# Patient Record
Sex: FEMALE | Race: BLACK OR AFRICAN AMERICAN | NOT HISPANIC OR LATINO | Employment: UNEMPLOYED | URBAN - METROPOLITAN AREA
[De-identification: names, ages, dates, MRNs, and addresses within clinical notes are randomized per-mention and may not be internally consistent; named-entity substitution may affect disease eponyms.]

---

## 2019-04-18 ENCOUNTER — HOSPITAL ENCOUNTER (OUTPATIENT)
Dept: PHYSICAL THERAPY | Facility: CLINIC | Age: 44
Setting detail: RECURRING SERIES
Discharge: HOME | End: 2019-06-27
Attending: NURSE PRACTITIONER

## 2019-05-03 ENCOUNTER — HOSPITAL ENCOUNTER (OUTPATIENT)
Dept: CARDIOLOGY | Facility: HOSPITAL | Age: 44
Discharge: HOME | End: 2019-05-03
Attending: NURSE PRACTITIONER

## 2020-02-21 ENCOUNTER — HOSPITAL ENCOUNTER (OUTPATIENT)
Dept: URGENT CARE | Facility: CLINIC | Age: 45
Discharge: HOME | End: 2020-02-21

## 2020-04-06 ENCOUNTER — OFFICE VISIT CONVERTED (OUTPATIENT)
Dept: CARDIOLOGY | Facility: CLINIC | Age: 45
End: 2020-04-06
Attending: INTERNAL MEDICINE

## 2020-04-22 ENCOUNTER — HOSPITAL ENCOUNTER (OUTPATIENT)
Dept: NUCLEAR MEDICINE | Facility: HOSPITAL | Age: 45
Discharge: HOME | End: 2020-04-22
Attending: INTERNAL MEDICINE

## 2020-08-18 ENCOUNTER — HOSPITAL ENCOUNTER (OUTPATIENT)
Dept: URGENT CARE | Facility: CLINIC | Age: 45
Discharge: HOME | End: 2020-08-18

## 2020-09-05 ENCOUNTER — HOSPITAL ENCOUNTER (OUTPATIENT)
Dept: URGENT CARE | Facility: CLINIC | Age: 45
Discharge: HOME | End: 2020-09-05
Attending: NURSE PRACTITIONER

## 2020-10-09 ENCOUNTER — HOSPITAL ENCOUNTER (OUTPATIENT)
Dept: URGENT CARE | Facility: CLINIC | Age: 45
Discharge: HOME | End: 2020-10-09
Attending: EMERGENCY MEDICINE

## 2020-10-09 LAB — GLUCOSE BLD-MCNC: 107 MG/DL (ref 65–99)

## 2020-10-12 LAB — BACTERIA SPEC AEROBE CULT: NORMAL

## 2021-05-10 NOTE — H&P
History and Physical      Patient Name: Casi Lopez   Patient ID: [id number]   Sex: Female   YOB: 1975        Visit Date: April 6, 2020    Provider: Gilbert Hill MD   Location: [facility]   Location Address: [address] [place] [address]  [id number]          History Of Present Illness  Consult requested by: [facility]   I saw Casi Lopez in the office today. This is a 44 year old /Black female. She has a history of atrial septal defect with surgical repair at age 5 in 1980. She has had intermittent chest pain off and on for many years. She last saw cardiology, it appears, in 2014. At that time she was seeing Dr. Darden. More recently, she has developed recurrent episodes of chest pain. It is moderately intense, sharp, random in occurrence, occasionally associated with some pressure in her chest. It sometimes occurs daily and lasts 10 to 15 minutes. It is not specifically exertional. She has not experienced syncope or significant palpitations. She had an EKG done in her primary care office, which showed diffuse precordial ST-T abnormalities. Interestingly, reviewing her records from 2011, similar changes were noted on an EKG at that time when she came in for a stress test.   PAST MEDICAL HISTORY: includes prediabetes, arthritis, heart murmur.   PSYCHOSOCIAL HISTORY: The patient is . No alcohol, smoking or drug use. She works as an /.   FAMILY HISTORY: Positive for diabetes and hypertension.   CURRENT MEDICATIONS: include Risperidone; Oxycodone; Neurontin; Vitamin D; Albuterol; Flexeril; Metformin 500 mg once a day. The dosage and frequency of the medications were reviewed with the patient.   ALLERGIES: No known drug allergies.      PAST SURGICAL HISTORY:  Hysterectomy, ASD repair, tubal ligation.       Review of Systems  · Constitutional  o Admits  o : good general health lately  o Denies  o : fatigue, recent weight  "changes   · Eyes  o Denies  o : double vision  · HENT  o Denies  o : hearing loss or ringing, chronic sinus problem, swollen glands in neck  · Cardiovascular  o Admits  o : chest pain, occasional feet and ankle swelling.  o Denies  o : palpitations (fast, fluttering, or skipping beats), shortness of breath while walking or lying flat  · Respiratory  o Admits  o : asthma or wheezing  o Denies  o : chronic or frequent cough, COPD  · Gastrointestinal  o Denies  o : ulcers, nausea or vomiting  · Neurologic  o Admits  o : lightheaded or dizzy, headaches  o Denies  o : stroke  · Musculoskeletal  o Admits  o : arthralgias and back pain.  · Endocrine  o Admits  o : diabetes  o Denies  o : thyroid disease, heat or cold intolerance, excessive thirst or urination  · Heme-Lymph  o Admits  o : anemia  o Denies  o : bleeding or bruising tendency      Vitals  Date Time BP Position Site L\R Cuff Size HR RR TEMP (F) WT  HT  BMI kg/m2 BSA m2 O2 Sat HC       04/06/2020 11:59 /87 Sitting    90 - R   184lbs 0oz 5'  7\" 28.82 1.99           Physical Examination  · Constitutional  o Appearance  o : This is an overweight, -American female, pleasant, in no acute distress.  · Head and Face  o HEENT  o : No pallor, anicteric. Eyes normal. Moist mucous membranes.  · Neck  o Inspection/Palpation  o : Supple. No hepatosplenomegaly.  o Jugular Veins  o : No JVD. No carotid bruits.  · Respiratory  o Auscultation of Lungs  o : Clear to auscultation bilaterally. No crackles or wheezing.  · Cardiovascular  o Heart  o : Regular rate and rhythm. Prominent S1. Soft basal systolic murmur.   · Gastrointestinal  o Abdominal Examination  o : Soft, non-distended. No palpable hepatosplenomegaly. Bowel sounds heard in all four quadrants.  · Musculoskeletal  o General  o : Normal muscle tone and strength.  · Skin and Subcutaneous Tissue  o General Inspection  o : No skin rashes.  · Extremities  o Extremities  o : Warm and well perfused. Distal " pulses present. No pitting pedal edema.     EKG reviewed by me showed sinus rhythm with left atrial abnormality, right ventricular hypertrophy with diffuse precordial ST-T changes.    Laboratory studies recently show mild anemia; otherwise, normal chemistry from late 2019.  Most recent lipid panel was several years ago and showed elevated total cholesterol.               Assessment     1.  Chest pain - The patient's chest pain is somewhat atypical in pattern.  Her risk factors for coronary artery        disease include obesity and possibly diabetes.  She has not had any cardiac workup recently.  Her baseline        EKG appears to be unchanged from her previous EKG in 2011 and is most likely consistent with right        ventricular hypertrophy from previous ASD, which eventually was repaired in 1980.    2.  ASD - status post surgical repair in 1980.  3.  Overweight, BMI 28.       Plan     1.  Regarding the patient's chest pain, she has not had an ischemia evaluation in some time, so we will        schedule stress imaging to evaluate for ischemia.  Given the abnormal baseline EKG, plain treadmill stress       testing is not going to be helpful in this situation.  I do suspect, however, these EKG changes are related to       her history of ASD and right ventricular overload and not likely to represent ischemic heart disease.    2.  An echocardiogram will be scheduled to evaluate right ventricular systolic pressure and right-sided chamber       sizes.  I suspect these are somewhat chronically elevated due to history of ASD.    3.  Regarding her elevated BMI, we discussed nutritional approaches to weight loss both to reduce her BMI as       well as to prevent future development of diabetes.  4.  We will discuss the diagnostic results when available; otherwise, the patient will be scheduled for a one-       year follow-up due to her cardiac history.  She is agreeable with this plan.    Thank you for allowing me to  participate in Ms. Lopez's care.  Please let me know if you have any questions regarding her case.    Sincerely,      RIANNA Hill M.D.  monico/demarco           This note was transcribed by Henrietta Sampson.  dmd/cbd  The above service was transcribed by Henrietta Sampson, and I attest to the accuracy of the note.  CBD..             Electronically Signed by: Henrietta Sampson-, -Author on April 7, 2020 10:17:04 AM  Electronically Co-signed by: Gilbert Hill MD -Reviewer on April 9, 2020 11:49:40 AM

## 2021-05-15 VITALS
WEIGHT: 184 LBS | HEART RATE: 90 BPM | BODY MASS INDEX: 28.88 KG/M2 | DIASTOLIC BLOOD PRESSURE: 87 MMHG | SYSTOLIC BLOOD PRESSURE: 137 MMHG | HEIGHT: 67 IN

## 2021-06-28 ENCOUNTER — TELEPHONE (OUTPATIENT)
Dept: CARDIOLOGY | Facility: CLINIC | Age: 46
End: 2021-06-28

## 2021-06-28 NOTE — TELEPHONE ENCOUNTER
ATTEMPTED TO CALL PT TO SEE IF THEY WANTED TO RS THEIR MISSED APPT. THEIR NUMBER IS NOT A WORKING NUMBER.

## 2021-06-30 ENCOUNTER — OFFICE VISIT (OUTPATIENT)
Dept: SLEEP MEDICINE | Facility: HOSPITAL | Age: 46
End: 2021-06-30

## 2021-06-30 VITALS
WEIGHT: 190 LBS | SYSTOLIC BLOOD PRESSURE: 123 MMHG | BODY MASS INDEX: 29.82 KG/M2 | HEIGHT: 67 IN | OXYGEN SATURATION: 96 % | DIASTOLIC BLOOD PRESSURE: 79 MMHG | HEART RATE: 88 BPM

## 2021-06-30 DIAGNOSIS — J45.909 MODERATE ASTHMA, UNSPECIFIED WHETHER COMPLICATED, UNSPECIFIED WHETHER PERSISTENT: ICD-10-CM

## 2021-06-30 DIAGNOSIS — G47.30 OBSERVED SLEEP APNEA: Primary | ICD-10-CM

## 2021-06-30 DIAGNOSIS — F51.04 PSYCHOPHYSIOLOGICAL INSOMNIA: ICD-10-CM

## 2021-06-30 DIAGNOSIS — G47.10 HYPERSOMNIA: ICD-10-CM

## 2021-06-30 DIAGNOSIS — R06.83 SNORING: ICD-10-CM

## 2021-06-30 PROCEDURE — G0463 HOSPITAL OUTPT CLINIC VISIT: HCPCS

## 2021-06-30 PROCEDURE — 99244 OFF/OP CNSLTJ NEW/EST MOD 40: CPT | Performed by: INTERNAL MEDICINE

## 2021-06-30 RX ORDER — QUETIAPINE FUMARATE 50 MG/1
TABLET, FILM COATED ORAL
COMMUNITY

## 2021-06-30 RX ORDER — ERGOCALCIFEROL 1.25 MG/1
CAPSULE ORAL
COMMUNITY
End: 2021-07-12

## 2021-06-30 RX ORDER — MONTELUKAST SODIUM 10 MG/1
TABLET ORAL
COMMUNITY

## 2021-06-30 RX ORDER — AMITRIPTYLINE HYDROCHLORIDE 50 MG/1
TABLET, FILM COATED ORAL
COMMUNITY

## 2021-06-30 RX ORDER — ESCITALOPRAM OXALATE 10 MG/1
TABLET ORAL
COMMUNITY

## 2021-06-30 RX ORDER — ALBUTEROL SULFATE 90 UG/1
AEROSOL, METERED RESPIRATORY (INHALATION)
COMMUNITY

## 2021-06-30 RX ORDER — GABAPENTIN 600 MG/1
TABLET ORAL EVERY 8 HOURS SCHEDULED
COMMUNITY

## 2021-06-30 RX ORDER — ATORVASTATIN CALCIUM 10 MG/1
TABLET, FILM COATED ORAL
COMMUNITY
End: 2023-02-01

## 2021-06-30 RX ORDER — FAMOTIDINE 20 MG/1
TABLET, FILM COATED ORAL
COMMUNITY

## 2021-06-30 RX ORDER — PRAZOSIN HYDROCHLORIDE 1 MG/1
CAPSULE ORAL
COMMUNITY

## 2021-06-30 RX ORDER — DILTIAZEM HYDROCHLORIDE 60 MG/1
2 TABLET, FILM COATED ORAL 2 TIMES DAILY
COMMUNITY
Start: 2021-06-22

## 2021-06-30 RX ORDER — BECLOMETHASONE DIPROPIONATE HFA 40 UG/1
1 AEROSOL, METERED RESPIRATORY (INHALATION) 2 TIMES DAILY
COMMUNITY
Start: 2021-04-27

## 2021-06-30 RX ORDER — ARIPIPRAZOLE 10 MG/1
TABLET ORAL
COMMUNITY

## 2021-06-30 NOTE — PROGRESS NOTES
[facility] [address]  [place]: [address] [place] [address]  Phone: [phone]  Fax; [phone]      Casi Lopez  1975  46 y.o.  female      Referring physician/provider and PCP Adore Quispe APRN    Type of service: Initial Sleep Medicine Consult.  Date of service: 6/30/2021      Chief Complaint   Patient presents with   • Witnessed Apnea   • Snoring   • Daytime Sleepiness   • Morning Headaches   • Insomnia       History of present illness;  Thank you for asking to see Casi Lopez, 46 y.o.. The patient was seen today on 6/30/2021 at [facility].  The patient presents today with symptoms of snoring, nonrestorative sleep and witnessed apneas. The symptoms are present for a year and they are persistent in nature.  The snoring is present in all positions and it is loud.  Has no history of prior sleep evaluation and sleep studies. Patient gives no prior surgery namely tonsillectomy, nasal surgery and UPPP.  As a child she had ASD repair.  In addition patient complains of having insomnia for the past year.  She has several awakenings in the night and cannot go back to sleep.    Patient gives the following sleep history.  Sleep schedule:  Bedtime: 10 PM  Wake time: 7 AM  Normally takes about 60 minutes to 120 minutes to fall asleep  Number of naps per day one 1 hour nap  Symptoms  In addition to snoring, nonrestorative sleep and witnessed apneas patient gives the following associated symptoms.  Have you ever awakened gasping for breath, coughing, choking: Yes   Change in weight,  Yes   Morning headaches  Yes   Awaken with a sore throat or dry mouth  Yes   Leg jerking at night:  No   Crawly feeling/urge sensation to move in the legs: No   Teeth grinding:Yes   Have you ever awakened at night with a sour taste or burning sensation in your chest:  No   Do you have muscle weakness with laughing or anger or sleep paralysis:  No   Have you ever felt paralyzed  "while going to sleep or waking up:  No   Sleepwalking, nightmares, No   Nocturia (urination at night): 1 times per night  Memory Problem:No     MEDICAL CONDITIONS (PMH)  1. Diabetes mellitus  2. Hypertension  3. ASD repair  4. Insomnia  5. PTSD  6. Depression    Social history:  Do you drive a commercial vehicle:  No   Shift work:  No   Tobacco use:  No   Alcohol use: 0 per week  Caffeinated drinks: 2 yvan    Family Hx (your parents and siblings)  1. Diabetes mellitus  2. Heart disease  3. Blood pressure  4. Stroke  5. Asthma    Medications: reviewed    Review of systems:  Sleep: Positve for snoring,witnessed apnea and daytime excessive sleepiness with Wilson Sleepiness Scale of Total score: 11   Kidney/ Bladder  Difficulty In Urination: negative  Bed Wetting: negative  Frequent Urination: negative  HEENT  Recurrent Nose Bleeds: negative  Ear pain: negative  Sores In Mouth: negative  Persistent Hoarseness: negative  Nasal Congestion: negative  Post Nasal Drip: negative  Musculoskeletal:  Neck Pain: negative  Temporomandibular Joint Pain: negative  General:  Fever: negative  Fatigue: positive  Vardiovascular:  Irregular Heartbeat: positive  Swollen Ankles Or Legs: negative  Respiratory:  Shortness Of Breath: negative  Wheezing: negative  Neuro/Paych:  Fainting Spells: negative  Dizziness: negative  Anxiety: negative  Depression: negative  Gastrointestinal:  Problem Swallowing: negative  Frequent Heartburn: negative  Abdominal Bloating: negative  Skin:  Rash: negative  Change In Nails: negative  Endocrine:  Excessive Thirst: negative  Always Too Cold: negative  Always Too Warm: negative  Hem/Lymphatic:  Swollen Glands: negative  Burses/ Bleeds Easily: negative    Physical exam:  CONSTITUTINONAL:  Vitals:    06/30/21 1100   BP: 123/79   BP Location: Left arm   Patient Position: Sitting   Pulse: 88   SpO2: 96%   Weight: 86.2 kg (190 lb)   Height: 170.2 cm (67\")    Body mass index is 29.76 kg/m².   HEAD: atraumatic, " normocephalic   EYES:pupils are round, equal and reacting to light and accommodation, conjunctiva normal  NOSE:no nasal septal defects, nasal passages are clear, no nasal polyps,   THOART: tonsils are not enlarged, tongue normal size, oral airway Mallampati class 3  NECK:Neck Circumference: 15 inches, trachea is in the midline, thyroid not enlarged  RESPIRATORY SYSTEM: Breath sounds are normal, there are no wheezes  CARDIOVASULAR SYSTEM: Heart sounds are regular rhythm and normal rate, there are no murmurs or thrills, no edema  GASTROINTESTIAN: Soft and non-tender,liver not enlarged, no evidence of ascites  MUSCULOSKELETAL SYSTEM: Full range of motion's in all the 4 extremities, neck movement not restricted, temporomandibular joint movement normal and no tenderness  SKIN: Warm and moist, no cyanosis, no clubbing,  NEUROLOGICAL SYSTEM: Oriented x 3, no gross neurological defects, No speech defect, gait is normal  PYSCHATRIC SYSTEM: Mood is normal, thought content is normal      Assessment and plan:  · Witnessed apneas,(R06.81) patient's symptoms and examination is consistent with sleep apnea (G47.30). I have talked to the patient about the signs and symptoms of sleep apnea. In addition, I have also discussed pathophysiology of sleep apnea.  I also discussed the complications of untreated sleep apnea including effects on hypertension, diabetes mellitus and nonrestorative sleep with hypersomnia which can increase risk for motor vehicle accidents.  Untreated sleep apnea is also a risk factor for development of atrial fibrillation, pulmonary hypertension and stroke.  Discussed in detail of various testing methods including home-based and lab based sleep studies.  Based on history and physical examination and other comorbidities the most appropriate study is split-night study.  [facility]  The test will be scheduled after approval from insurance. Treatment and management will be  discussed after the test is completed.  Patient was given opportunity to ask questions and all the questions were answered.   · Snoring (R06.83), snoring is the sound created by turbulent airflow vibrating upper airway soft tissue due to limitation of inspiratory airflow. I have also discussed factors affecting snoring including sleep deprivation, sleeping on the back and alcohol ingestion. To minimize snoring, patient is advised to have adequate sleep, sleep on the side and avoid alcohol and sedative medications before bedtime  · Hypersomnia, (G 47.10) Daytime excessive sleepiness was assessed with [place] Sleepiness Scale of Total score: 11.  There are many causes for daytime excessive sleepiness including sleep depression, shiftwork syndrome, depression and other medical disorders including heart, kidney and liver failure.  The most serious cause of hypersomnia is due to neurological conditions like narcolepsy/cataplexy.  But the most common cause of hypersomnia is due to sleep apnea with frequent awakenings during sleep time.  I have discussed safety of driving and to remain vigilant while driving.  · Asthma.  She has a history of asthma on bronchodilators.  · Insomnia, patient is also experiencing insomnia for the past year.  I talked to the patient about the sleep hygiene and cognitive well therapy in addition she is going to try melatonin 3 mg about 2 hours before bedtime.    I have also discussed with the patient the following  • Sleep hygiene: Maintaining a regular bedtime and wake time, not to watch television or work in bed, limit caffeine-containing beverages before bed time and avoid naps during the day  • Adequate amount of sleep.  Generally most people needs about 7 to 8 hours of sleep.    Return for 31 to 90 days after PAP setup with down load..  Patient's questions were answered      I once again thank you for asking me to see this patient in consultation and I have forwarded my opinion and  treatment plan.  Please do not hesitate to call me if you have any questions.     Aurelio Biswas MD  Sleep Medicine  Medical Director, [facility]  6/30/2021 ,

## 2021-07-19 ENCOUNTER — OFFICE VISIT (OUTPATIENT)
Dept: CARDIOLOGY | Facility: CLINIC | Age: 46
End: 2021-07-19

## 2021-07-19 ENCOUNTER — PREP FOR SURGERY (OUTPATIENT)
Dept: OTHER | Facility: HOSPITAL | Age: 46
End: 2021-07-19

## 2021-07-19 VITALS
DIASTOLIC BLOOD PRESSURE: 84 MMHG | SYSTOLIC BLOOD PRESSURE: 142 MMHG | HEART RATE: 80 BPM | BODY MASS INDEX: 29.66 KG/M2 | HEIGHT: 67 IN | WEIGHT: 189 LBS

## 2021-07-19 DIAGNOSIS — R07.2 PRECORDIAL PAIN: Primary | ICD-10-CM

## 2021-07-19 DIAGNOSIS — I10 ESSENTIAL HYPERTENSION: ICD-10-CM

## 2021-07-19 DIAGNOSIS — I20.9 ANGINA PECTORIS (HCC): Primary | ICD-10-CM

## 2021-07-19 PROCEDURE — 99214 OFFICE O/P EST MOD 30 MIN: CPT | Performed by: INTERNAL MEDICINE

## 2021-07-19 RX ORDER — OXYCODONE HYDROCHLORIDE AND ACETAMINOPHEN 5; 325 MG/1; MG/1
1 TABLET ORAL EVERY 8 HOURS PRN
COMMUNITY
Start: 2021-07-09 | End: 2022-12-16

## 2021-07-19 NOTE — PROGRESS NOTES
"Chief Complaint  Chest Pain and Shortness of Breath    Subjective     {Problem List  Visit Diagnosis   Encounters  Notes  Medications  Labs  Result Review Imaging  Media :23}       Casi Lopez presents to [facility] CARDI[facility]  History of Present Illness    Casi is here for follow-up evaluation management of congenital heart disease with previous ASD repair at age 5, chest pain, shortness of breath and follow-up of diagnostics.  She has had progressive chest tightness, pressure described as at times an elephant sitting on her chest associated with shortness of breath.  Sometimes improved with rest and putting heat on her chest.  No specific pattern is somewhat random.  She is compliant with her medications.    PMH  Past Medical History:   Diagnosis Date   • Asthma    • Depression    • Diabetes mellitus (CMS/[facility])    • Hyperlipidemia    • Hypertension    • Injury of back          SURGICALHX  Past Surgical History:   Procedure Laterality Date   • CARDIAC SURGERY      \"repair of hole in heart\"   • HYSTERECTOMY          SOC  Social History     Socioeconomic History   • Marital status:      Spouse name: Not on file   • Number of children: Not on file   • Years of education: Not on file   • Highest education level: Not on file   Tobacco Use   • Smoking status: Never Smoker   • Smokeless tobacco: Never Used   Vaping Use   • Vaping Use: Never used   Substance and Sexual Activity   • Alcohol use: Never   • Drug use: Never         FAMHX  Family History   Problem Relation Age of Onset   • Diabetes Mother    • Hypertension Mother    • Hypertension Father    • Diabetes Father           ALLERGY  No Known Allergies     MEDSCURRENT    Current Outpatient Medications:   •  albuterol sulfate  (90 Base) MCG/ACT inhaler, albuterol sulfate HFA 90 mcg/actuation aerosol inhaler, Disp: , Rfl:   •  amitriptyline (ELAVIL) 50 MG tablet, amitriptyline 50 mg tablet, Disp: , Rfl:   •  ARIPiprazole " (ABILIFY) 10 MG tablet, aripiprazole 10 mg tablet  TAKE 1 TABLET BY MOUTH DAILY, Disp: , Rfl:   •  atorvastatin (LIPITOR) 10 MG tablet, atorvastatin 10 mg tablet  TAKE 1 TABLET BY MOUTH EVERY DAY, Disp: , Rfl:   •  benzonatate (TESSALON) 200 MG capsule, Take 1 capsule by mouth 3 (Three) Times a Day As Needed for Cough., Disp: 40 capsule, Rfl: 0  •  doxycycline (ADOXA) 100 MG tablet, Take 1 tablet by mouth 2 (Two) Times a Day., Disp: 20 tablet, Rfl: 0  •  escitalopram (LEXAPRO) 10 MG tablet, escitalopram 10 mg tablet, Disp: , Rfl:   •  famotidine (PEPCID) 20 MG tablet, famotidine 20 mg tablet  TAKE 1 TABLET BY MOUTH TWICE DAILY, Disp: , Rfl:   •  gabapentin (NEURONTIN) 600 MG tablet, Every 8 (Eight) Hours., Disp: , Rfl:   •  metFORMIN (GLUCOPHAGE) 500 MG tablet, metformin 500 mg tablet  TAKE 1 TABLET BY MOUTH TWICE DAILY, Disp: , Rfl:   •  montelukast (SINGULAIR) 10 MG tablet, montelukast 10 mg tablet  TAKE 1 TABLET BY MOUTH DAILY, Disp: , Rfl:   •  oxyCODONE-acetaminophen (PERCOCET) 5-325 MG per tablet, Take 1 tablet by mouth Every 8 (Eight) Hours As Needed., Disp: , Rfl:   •  prazosin (MINIPRESS) 2 MG capsule, Take 2 mg by mouth Every Night., Disp: , Rfl:   •  predniSONE (DELTASONE) 20 MG tablet, Take 3 tab po qd x 2 days then take 2 tab po qd x 3 days then take 1 tab po qd x 3 days, Disp: 15 tablet, Rfl: 0  •  QUEtiapine (SEROquel) 50 MG tablet, quetiapine 50 mg tablet  TAKE 1 TABLET BY MOUTH EVERY NIGHT, Disp: , Rfl:   •  Qvar RediHaler 40 MCG/ACT inhaler, Inhale 1 puff 2 (Two) Times a Day., Disp: , Rfl:   •  Symbicort 80-4.5 MCG/ACT inhaler, Inhale 2 puffs 2 (Two) Times a Day., Disp: , Rfl:   •  prazosin (MINIPRESS) 1 MG capsule, prazosin 1 mg capsule, Disp: , Rfl:       Review of Systems   Constitutional: Positive for malaise/fatigue.   Cardiovascular: Positive for chest pain and dyspnea on exertion.   Respiratory: Positive for shortness of breath.         Objective     /84   Pulse 80   Ht 170.2 cm  "(67\")   Wt 85.7 kg (189 lb)   BMI 29.60 kg/m²       General Appearance:   · well developed  · well nourished  HENT:   · oropharynx moist  · lips not cyanotic  Neck:  · thyroid not enlarged  · supple  Respiratory:  · no respiratory distress  · normal breath sounds  · no rales  Cardiovascular:  · no jugular venous distention  · regular rhythm  · apical impulse normal  · S1 normal, S2 normal  · no S3, no S4   · no murmur  · no rub, no thrill  · carotid pulses normal; no bruit  · pedal pulses normal  · lower extremity edema: none    Musculoskeletal:  · no clubbing of fingers.   · normocephalic, head atraumatic  Skin:   · warm, dry  Psychiatric:  · judgement and insight appropriate  · normal mood and affect      Result Review :             Data reviewed: Cardiology studies Stress imaging April 2020 was normal, echo showed no evidence of residual ASD, otherwise structurally normal     Procedures                  Assessment and Plan        ASSESSMENT:  Encounter Diagnoses   Name Primary?   • Precordial pain Yes   • Essential hypertension          PLAN:    1.  I discussed with Casi today based on her symptoms and risk factors a more definitive assessment for CAD is recommended.  I discussed coronary angiography including both radial and femoral access, risks and benefits, she is agreeable to proceed.  She is going to be Covid vaccinated first shot by the end of this month, to avoid preop Covid testing, given the elective nature of the procedure we will schedule the catheterization third week of September so that she is fully vaccinated.  She is agreeable with this plan.  2.  Continue current medical therapy otherwise            Patient was given instructions and counseling regarding her condition or for health maintenance advice. Please see specific information pulled into the AVS if appropriate.             NNAMDI Hill MD  7/19/2021    15:43 EDT  "

## 2021-07-22 ENCOUNTER — TRANSCRIBE ORDERS (OUTPATIENT)
Dept: LAB | Facility: HOSPITAL | Age: 46
End: 2021-07-22

## 2021-07-22 DIAGNOSIS — Z01.818 PREOP TESTING: Primary | ICD-10-CM

## 2021-07-22 PROBLEM — I20.9 ANGINA PECTORIS (HCC): Status: ACTIVE | Noted: 2021-07-22

## 2021-07-29 ENCOUNTER — LAB (OUTPATIENT)
Dept: LAB | Facility: HOSPITAL | Age: 46
End: 2021-07-29

## 2021-07-29 DIAGNOSIS — Z01.818 PREOP TESTING: ICD-10-CM

## 2021-07-29 DIAGNOSIS — I20.9 ANGINA PECTORIS (HCC): ICD-10-CM

## 2021-07-29 LAB
DEPRECATED RDW RBC AUTO: 48.3 FL (ref 37–54)
ERYTHROCYTE [DISTWIDTH] IN BLOOD BY AUTOMATED COUNT: 14.1 % (ref 12.3–15.4)
HCT VFR BLD AUTO: 39.5 % (ref 34–46.6)
HGB BLD-MCNC: 12.4 G/DL (ref 12–15.9)
INR PPP: 0.91 (ref 2–3)
MCH RBC QN AUTO: 29.8 PG (ref 26.6–33)
MCHC RBC AUTO-ENTMCNC: 31.4 G/DL (ref 31.5–35.7)
MCV RBC AUTO: 95 FL (ref 79–97)
PLATELET # BLD AUTO: 314 10*3/MM3 (ref 140–450)
PMV BLD AUTO: 11.3 FL (ref 6–12)
PROTHROMBIN TIME: 10.1 SECONDS (ref 9.4–12)
RBC # BLD AUTO: 4.16 10*6/MM3 (ref 3.77–5.28)
WBC # BLD AUTO: 13.35 10*3/MM3 (ref 3.4–10.8)

## 2021-07-29 PROCEDURE — 80048 BASIC METABOLIC PNL TOTAL CA: CPT

## 2021-07-29 PROCEDURE — 85610 PROTHROMBIN TIME: CPT

## 2021-07-29 PROCEDURE — U0003 INFECTIOUS AGENT DETECTION BY NUCLEIC ACID (DNA OR RNA); SEVERE ACUTE RESPIRATORY SYNDROME CORONAVIRUS 2 (SARS-COV-2) (CORONAVIRUS DISEASE [COVID-19]), AMPLIFIED PROBE TECHNIQUE, MAKING USE OF HIGH THROUGHPUT TECHNOLOGIES AS DESCRIBED BY CMS-2020-01-R: HCPCS

## 2021-07-29 PROCEDURE — C9803 HOPD COVID-19 SPEC COLLECT: HCPCS

## 2021-07-29 PROCEDURE — 36415 COLL VENOUS BLD VENIPUNCTURE: CPT

## 2021-07-29 PROCEDURE — 85027 COMPLETE CBC AUTOMATED: CPT

## 2021-07-30 ENCOUNTER — TELEPHONE (OUTPATIENT)
Dept: CARDIOLOGY | Facility: CLINIC | Age: 46
End: 2021-07-30

## 2021-07-30 DIAGNOSIS — E87.6 HYPOKALEMIA: Primary | ICD-10-CM

## 2021-07-30 LAB
ANION GAP SERPL CALCULATED.3IONS-SCNC: 13.4 MMOL/L (ref 5–15)
BUN SERPL-MCNC: 12 MG/DL (ref 6–20)
BUN/CREAT SERPL: 13.3 (ref 7–25)
CALCIUM SPEC-SCNC: 9.4 MG/DL (ref 8.6–10.5)
CHLORIDE SERPL-SCNC: 105 MMOL/L (ref 98–107)
CO2 SERPL-SCNC: 21.6 MMOL/L (ref 22–29)
CREAT SERPL-MCNC: 0.9 MG/DL (ref 0.57–1)
GFR SERPL CREATININE-BSD FRML MDRD: 81 ML/MIN/1.73
GLUCOSE SERPL-MCNC: 88 MG/DL (ref 65–99)
POTASSIUM SERPL-SCNC: 2.9 MMOL/L (ref 3.5–5.2)
SARS-COV-2 RNA RESP QL NAA+PROBE: NOT DETECTED
SODIUM SERPL-SCNC: 140 MMOL/L (ref 136–145)

## 2021-07-30 RX ORDER — POTASSIUM CHLORIDE 750 MG/1
20 TABLET, FILM COATED, EXTENDED RELEASE ORAL 2 TIMES DAILY
Qty: 180 TABLET | Refills: 3 | Status: SHIPPED | OUTPATIENT
Start: 2021-07-30

## 2021-07-30 NOTE — TELEPHONE ENCOUNTER
----- Message from NNAMDI Hill MD sent at 7/30/2021  2:23 PM EDT -----  This patient has a low potassium 2.9.  I ordered potassium 20 mEq twice daily to her pharmacy.  Repeat basic metabolic panel in 2 weeks.  Start taking the extra potassium today if possibleAlso supplement diet with extra potassium foods such as bananas, citrus fruits, raw green peppers    Follow-up as scheduled otherwise but get the basic metabolic panel repeated in 2 weeks

## 2021-08-02 ENCOUNTER — HOSPITAL ENCOUNTER (OUTPATIENT)
Dept: SLEEP MEDICINE | Facility: HOSPITAL | Age: 46
Discharge: HOME | End: 2021-08-02
Admitting: INTERNAL MEDICINE

## 2021-08-02 DIAGNOSIS — G47.30 OBSERVED SLEEP APNEA: ICD-10-CM

## 2021-08-02 DIAGNOSIS — R06.83 SNORING: ICD-10-CM

## 2021-08-02 DIAGNOSIS — F51.04 PSYCHOPHYSIOLOGICAL INSOMNIA: ICD-10-CM

## 2021-08-02 DIAGNOSIS — J45.909 MODERATE ASTHMA, UNSPECIFIED WHETHER COMPLICATED, UNSPECIFIED WHETHER PERSISTENT: ICD-10-CM

## 2021-08-02 DIAGNOSIS — G47.10 HYPERSOMNIA: ICD-10-CM

## 2021-08-02 PROCEDURE — 95811 POLYSOM 6/>YRS CPAP 4/> PARM: CPT

## 2021-08-02 PROCEDURE — 95811 POLYSOM 6/>YRS CPAP 4/> PARM: CPT | Performed by: INTERNAL MEDICINE

## 2021-08-05 ENCOUNTER — TRANSCRIBE ORDERS (OUTPATIENT)
Dept: ADMINISTRATIVE | Facility: HOSPITAL | Age: 46
End: 2021-08-05

## 2021-08-05 DIAGNOSIS — Z01.818 PREOP EXAMINATION: Primary | ICD-10-CM

## 2021-08-10 ENCOUNTER — LAB (OUTPATIENT)
Dept: LAB | Facility: HOSPITAL | Age: 46
End: 2021-08-10

## 2021-08-10 DIAGNOSIS — Z01.818 PREOP EXAMINATION: ICD-10-CM

## 2021-08-10 PROCEDURE — U0003 INFECTIOUS AGENT DETECTION BY NUCLEIC ACID (DNA OR RNA); SEVERE ACUTE RESPIRATORY SYNDROME CORONAVIRUS 2 (SARS-COV-2) (CORONAVIRUS DISEASE [COVID-19]), AMPLIFIED PROBE TECHNIQUE, MAKING USE OF HIGH THROUGHPUT TECHNOLOGIES AS DESCRIBED BY CMS-2020-01-R: HCPCS

## 2021-08-10 PROCEDURE — C9803 HOPD COVID-19 SPEC COLLECT: HCPCS

## 2021-08-11 DIAGNOSIS — R06.83 SNORING: ICD-10-CM

## 2021-08-11 DIAGNOSIS — G47.33 OSA (OBSTRUCTIVE SLEEP APNEA): Primary | ICD-10-CM

## 2021-08-11 LAB — SARS-COV-2 RNA RESP QL NAA+PROBE: NOT DETECTED

## 2021-08-12 ENCOUNTER — HOSPITAL ENCOUNTER (OUTPATIENT)
Facility: HOSPITAL | Age: 46
Setting detail: HOSPITAL OUTPATIENT SURGERY
Discharge: HOME | End: 2021-08-12
Attending: INTERNAL MEDICINE | Admitting: INTERNAL MEDICINE

## 2021-08-12 VITALS
HEIGHT: 67 IN | DIASTOLIC BLOOD PRESSURE: 94 MMHG | SYSTOLIC BLOOD PRESSURE: 135 MMHG | RESPIRATION RATE: 16 BRPM | TEMPERATURE: 98.2 F | BODY MASS INDEX: 28.72 KG/M2 | OXYGEN SATURATION: 100 % | HEART RATE: 85 BPM | WEIGHT: 183 LBS

## 2021-08-12 DIAGNOSIS — I20.9 ANGINA PECTORIS (HCC): ICD-10-CM

## 2021-08-12 LAB — QT INTERVAL: 379 MS

## 2021-08-12 PROCEDURE — 0 IOPAMIDOL PER 1 ML: Performed by: INTERNAL MEDICINE

## 2021-08-12 PROCEDURE — 25010000002 FENTANYL CITRATE (PF) 50 MCG/ML SOLUTION: Performed by: INTERNAL MEDICINE

## 2021-08-12 PROCEDURE — C1769 GUIDE WIRE: HCPCS | Performed by: INTERNAL MEDICINE

## 2021-08-12 PROCEDURE — 93458 L HRT ARTERY/VENTRICLE ANGIO: CPT | Performed by: INTERNAL MEDICINE

## 2021-08-12 PROCEDURE — 93005 ELECTROCARDIOGRAM TRACING: CPT | Performed by: INTERNAL MEDICINE

## 2021-08-12 PROCEDURE — 25010000002 MIDAZOLAM PER 1MG: Performed by: INTERNAL MEDICINE

## 2021-08-12 PROCEDURE — 25010000002 HEPARIN (PORCINE) PER 1000 UNITS: Performed by: INTERNAL MEDICINE

## 2021-08-12 PROCEDURE — C1894 INTRO/SHEATH, NON-LASER: HCPCS | Performed by: INTERNAL MEDICINE

## 2021-08-12 RX ORDER — MIDAZOLAM HYDROCHLORIDE 2 MG/2ML
INJECTION, SOLUTION INTRAMUSCULAR; INTRAVENOUS AS NEEDED
Status: DISCONTINUED | OUTPATIENT
Start: 2021-08-12 | End: 2021-08-12 | Stop reason: HOSPADM

## 2021-08-12 RX ORDER — HEPARIN SODIUM 1000 [USP'U]/ML
INJECTION, SOLUTION INTRAVENOUS; SUBCUTANEOUS AS NEEDED
Status: DISCONTINUED | OUTPATIENT
Start: 2021-08-12 | End: 2021-08-12 | Stop reason: HOSPADM

## 2021-08-12 RX ORDER — FENTANYL CITRATE 50 UG/ML
INJECTION, SOLUTION INTRAMUSCULAR; INTRAVENOUS AS NEEDED
Status: DISCONTINUED | OUTPATIENT
Start: 2021-08-12 | End: 2021-08-12 | Stop reason: HOSPADM

## 2021-08-12 RX ORDER — VERAPAMIL HYDROCHLORIDE 2.5 MG/ML
INJECTION, SOLUTION INTRAVENOUS AS NEEDED
Status: DISCONTINUED | OUTPATIENT
Start: 2021-08-12 | End: 2021-08-12 | Stop reason: HOSPADM

## 2021-08-12 RX ORDER — LIDOCAINE HYDROCHLORIDE 20 MG/ML
INJECTION, SOLUTION INFILTRATION; PERINEURAL AS NEEDED
Status: DISCONTINUED | OUTPATIENT
Start: 2021-08-12 | End: 2021-08-12 | Stop reason: HOSPADM

## 2021-08-12 NOTE — INTERVAL H&P NOTE
H&P reviewed. The patient was examined and there are no changes to the H&P.      Proceed with LHC via R radial approach    Gilbert Hill MD

## 2021-08-16 ENCOUNTER — TELEPHONE (OUTPATIENT)
Dept: SLEEP MEDICINE | Facility: HOSPITAL | Age: 46
End: 2021-08-16

## 2021-08-16 NOTE — TELEPHONE ENCOUNTER
Called pt - she is moving to [place] at the end of the week - she will  her set up packet and start on a machine in [facility] - she will also establish care for follow-up once she has moved.

## 2021-08-17 ENCOUNTER — TELEPHONE (OUTPATIENT)
Dept: CARDIOLOGY | Facility: CLINIC | Age: 46
End: 2021-08-17

## 2021-08-17 NOTE — TELEPHONE ENCOUNTER
Received call from patient.  She c/o swelling in her right hand/fingers and wrist.  Swelling started 2 days ago.  C/o pain in her right wrist.  Denies bruising/reddness.      Patient had cardiac cath on 8/12, right radial approach.

## 2021-08-17 NOTE — TELEPHONE ENCOUNTER
VM transferred from .      Returned call to patient.  No answer, LVM.    IF PATIENT RETURNS CALL, PLEASE SCHEDULE HER AN APPOINTMENT WITH JEANIE REYNOLDS FOR TOMORROW, 8/18.

## 2021-08-18 ENCOUNTER — OFFICE VISIT (OUTPATIENT)
Dept: CARDIOLOGY | Facility: CLINIC | Age: 46
End: 2021-08-18

## 2021-08-18 VITALS
WEIGHT: 191 LBS | SYSTOLIC BLOOD PRESSURE: 130 MMHG | HEART RATE: 88 BPM | DIASTOLIC BLOOD PRESSURE: 72 MMHG | BODY MASS INDEX: 29.98 KG/M2 | HEIGHT: 67 IN

## 2021-08-18 DIAGNOSIS — I10 ESSENTIAL HYPERTENSION: Primary | ICD-10-CM

## 2021-08-18 DIAGNOSIS — R07.2 PRECORDIAL PAIN: ICD-10-CM

## 2021-08-18 DIAGNOSIS — M79.89 SWELLING OF RIGHT HAND: ICD-10-CM

## 2021-08-18 PROCEDURE — 99213 OFFICE O/P EST LOW 20 MIN: CPT | Performed by: NURSE PRACTITIONER

## 2021-08-18 NOTE — PROGRESS NOTES
"Chief Complaint  POST CATH- RIGHT WRIST PAIN AND SWELLING    Subjective            Casi Lopez presents to [facility] CARDIOLOGY  History of Present Illness    Casi Lopez is a 46-year-old -American female here today for follow-up after her cardiac catheterization procedure.  The procedure concluded normal coronary arteries, normal left ventricular systolic function.  She denies chest pain or shortness of breath today.  She reports swelling and pain in her right hand, right radial artery access was used for the procedure. She is compliant with all of her medications. She reports she is moving to [place] this weekend.     PMH  Past Medical History:   Diagnosis Date   • Asthma    • Depression    • Diabetes mellitus (CMS/[facility], located [facility])    • Hyperlipidemia    • Hypertension    • Injury of back          SURGICALHX  Past Surgical History:   Procedure Laterality Date   • CARDIAC CATHETERIZATION N/A 8/12/2021    Procedure: Left Heart Cath - R radial;  Surgeon: NNAMDI Hill MD;  Location: [facility] CATH INVASIVE LOCATION;  Service: Cardiology;  Laterality: N/A;   • CARDIAC SURGERY      \"repair of hole in heart\"   • HYSTERECTOMY          SOC  Social History     Socioeconomic History   • Marital status:      Spouse name: Not on file   • Number of children: Not on file   • Years of education: Not on file   • Highest education level: Not on file   Tobacco Use   • Smoking status: Never Smoker   • Smokeless tobacco: Never Used   Vaping Use   • Vaping Use: Never used   Substance and Sexual Activity   • Alcohol use: Never   • Drug use: Never         FAMHX  Family History   Problem Relation Age of Onset   • Diabetes Mother    • Hypertension Mother    • Hypertension Father    • Diabetes Father           ALLERGY  No Known Allergies     MEDSCURRENT    Current Outpatient Medications:   •  albuterol sulfate  (90 Base) MCG/ACT inhaler, albuterol sulfate HFA 90 mcg/actuation aerosol inhaler, Disp: , " Rfl:   •  amitriptyline (ELAVIL) 50 MG tablet, amitriptyline 50 mg tablet, Disp: , Rfl:   •  ARIPiprazole (ABILIFY) 10 MG tablet, aripiprazole 10 mg tablet  TAKE 1 TABLET BY MOUTH DAILY, Disp: , Rfl:   •  atorvastatin (LIPITOR) 10 MG tablet, atorvastatin 10 mg tablet  TAKE 1 TABLET BY MOUTH EVERY DAY, Disp: , Rfl:   •  benzonatate (TESSALON) 200 MG capsule, Take 1 capsule by mouth 3 (Three) Times a Day As Needed for Cough., Disp: 40 capsule, Rfl: 0  •  doxycycline (ADOXA) 100 MG tablet, Take 1 tablet by mouth 2 (Two) Times a Day., Disp: 20 tablet, Rfl: 0  •  escitalopram (LEXAPRO) 10 MG tablet, escitalopram 10 mg tablet, Disp: , Rfl:   •  famotidine (PEPCID) 20 MG tablet, famotidine 20 mg tablet  TAKE 1 TABLET BY MOUTH TWICE DAILY, Disp: , Rfl:   •  gabapentin (NEURONTIN) 600 MG tablet, Every 8 (Eight) Hours., Disp: , Rfl:   •  metFORMIN (GLUCOPHAGE) 500 MG tablet, metformin 500 mg tablet  TAKE 1 TABLET BY MOUTH TWICE DAILY, Disp: , Rfl:   •  montelukast (SINGULAIR) 10 MG tablet, montelukast 10 mg tablet  TAKE 1 TABLET BY MOUTH DAILY, Disp: , Rfl:   •  oxyCODONE-acetaminophen (PERCOCET) 5-325 MG per tablet, Take 1 tablet by mouth Every 8 (Eight) Hours As Needed., Disp: , Rfl:   •  potassium chloride 10 MEQ CR tablet, Take 2 tablets by mouth 2 (Two) Times a Day., Disp: 180 tablet, Rfl: 3  •  prazosin (MINIPRESS) 1 MG capsule, prazosin 1 mg capsule, Disp: , Rfl:   •  prazosin (MINIPRESS) 2 MG capsule, Take 2 mg by mouth Every Night., Disp: , Rfl:   •  predniSONE (DELTASONE) 20 MG tablet, Take 3 tab po qd x 2 days then take 2 tab po qd x 3 days then take 1 tab po qd x 3 days, Disp: 15 tablet, Rfl: 0  •  QUEtiapine (SEROquel) 50 MG tablet, quetiapine 50 mg tablet  TAKE 1 TABLET BY MOUTH EVERY NIGHT, Disp: , Rfl:   •  Qvar RediHaler 40 MCG/ACT inhaler, Inhale 1 puff 2 (Two) Times a Day., Disp: , Rfl:   •  Symbicort 80-4.5 MCG/ACT inhaler, Inhale 2 puffs 2 (Two) Times a Day., Disp: , Rfl:       Review of Systems  "  Cardiovascular: Negative for chest pain, dyspnea on exertion, irregular heartbeat and leg swelling.   Respiratory: Negative for shortness of breath.    Hematologic/Lymphatic: Does not bruise/bleed easily.   Musculoskeletal:        Swelling and pain in right hand        Objective     /72   Pulse 88   Ht 170.2 cm (67\")   Wt 86.6 kg (191 lb)   BMI 29.91 kg/m²       General Appearance:   · well developed  · well nourished  HENT:   · oropharynx moist  · lips not cyanotic  Neck:  · thyroid not enlarged  · supple  Respiratory:  · no respiratory distress  · normal breath sounds  · no rales  Cardiovascular:  · no jugular venous distention  · regular rhythm  · apical impulse normal  · S1 normal, S2 normal  · no S3, no S4   · no murmur  · no rub, no thrill  · carotid pulses normal; no bruit  · pedal pulses normal  · lower extremity edema: none    Musculoskeletal:  · no clubbing of fingers.   · normocephalic, head atraumatic  · Generalized edema of right hand, normal color, temperature,  movement, normal radial pulses.  Skin:   · warm, dry  Psychiatric:  · judgement and insight appropriate  · normal mood and affect      Result Review :         CMP    CMP 7/29/21   Glucose 88   BUN 12   Creatinine 0.90   eGFR African Am 81   Sodium 140   Potassium 2.9 (A)   Chloride 105   Calcium 9.4   (A) Abnormal value            CBC    CBC 7/29/21   WBC 13.35 (A)   RBC 4.16   Hemoglobin 12.4   Hematocrit 39.5   MCV 95.0   MCH 29.8   MCHC 31.4 (A)   RDW 14.1   Platelets 314   (A) Abnormal value              Data reviewed: Cardiac catheterization procedure notes reviewed                Assessment and Plan        ASSESSMENT:  Encounter Diagnoses   Name Primary?   • Essential hypertension Yes   • Swelling of right hand    • Precordial pain          PLAN:    1.  Casi does have some minimal swelling to the top of her right hand.  She has normal pulses, movement, color, and temperature of her extremity.  Instructed her to keep hand " elevated and that she may apply a cold compress in efforts to resolve the swelling.   2.  Instructed to alternate acetaminophen and ibuprofen for any pain associated.  3.  Instructed to call the office if symptoms persist or worsen.    Patient agreeable with plan of care. Follow up with  scheduled.         Patient was given instructions and counseling regarding her condition or for health maintenance advice. Please see specific information pulled into the AVS if appropriate.           Marce Graham, APRN   8/18/2021  09:58 EDT

## 2021-08-30 ENCOUNTER — TELEPHONE (OUTPATIENT)
Dept: CARDIOLOGY | Facility: CLINIC | Age: 46
End: 2021-08-30

## 2022-12-16 ENCOUNTER — APPOINTMENT (OUTPATIENT)
Dept: GENERAL RADIOLOGY | Facility: HOSPITAL | Age: 47
End: 2022-12-16

## 2022-12-16 ENCOUNTER — HOSPITAL ENCOUNTER (EMERGENCY)
Facility: HOSPITAL | Age: 47
Discharge: HOME | End: 2022-12-16
Attending: EMERGENCY MEDICINE | Admitting: EMERGENCY MEDICINE

## 2022-12-16 VITALS
TEMPERATURE: 98 F | RESPIRATION RATE: 16 BRPM | OXYGEN SATURATION: 99 % | BODY MASS INDEX: 31.21 KG/M2 | SYSTOLIC BLOOD PRESSURE: 144 MMHG | HEART RATE: 81 BPM | HEIGHT: 67 IN | DIASTOLIC BLOOD PRESSURE: 94 MMHG | WEIGHT: 198.85 LBS

## 2022-12-16 DIAGNOSIS — M16.11 OSTEOARTHRITIS OF RIGHT HIP, UNSPECIFIED OSTEOARTHRITIS TYPE: ICD-10-CM

## 2022-12-16 DIAGNOSIS — M94.0 COSTOCHONDRITIS: Primary | ICD-10-CM

## 2022-12-16 LAB
ALBUMIN SERPL-MCNC: 4 G/DL (ref 3.5–5.2)
ALBUMIN/GLOB SERPL: 1.5 G/DL
ALP SERPL-CCNC: 85 U/L (ref 39–117)
ALT SERPL W P-5'-P-CCNC: 16 U/L (ref 1–33)
ANION GAP SERPL CALCULATED.3IONS-SCNC: 13.7 MMOL/L (ref 5–15)
AST SERPL-CCNC: 14 U/L (ref 1–32)
BASOPHILS # BLD AUTO: 0.01 10*3/MM3 (ref 0–0.2)
BASOPHILS NFR BLD AUTO: 0.2 % (ref 0–1.5)
BILIRUB SERPL-MCNC: 0.3 MG/DL (ref 0–1.2)
BUN SERPL-MCNC: 8 MG/DL (ref 6–20)
BUN/CREAT SERPL: 9.6 (ref 7–25)
CALCIUM SPEC-SCNC: 8.8 MG/DL (ref 8.6–10.5)
CHLORIDE SERPL-SCNC: 103 MMOL/L (ref 98–107)
CO2 SERPL-SCNC: 25.3 MMOL/L (ref 22–29)
CREAT SERPL-MCNC: 0.83 MG/DL (ref 0.57–1)
DEPRECATED RDW RBC AUTO: 51 FL (ref 37–54)
EGFRCR SERPLBLD CKD-EPI 2021: 87.1 ML/MIN/1.73
EOSINOPHIL # BLD AUTO: 0.04 10*3/MM3 (ref 0–0.4)
EOSINOPHIL NFR BLD AUTO: 0.7 % (ref 0.3–6.2)
ERYTHROCYTE [DISTWIDTH] IN BLOOD BY AUTOMATED COUNT: 15 % (ref 12.3–15.4)
GLOBULIN UR ELPH-MCNC: 2.7 GM/DL
GLUCOSE SERPL-MCNC: 176 MG/DL (ref 65–99)
HCT VFR BLD AUTO: 35.7 % (ref 34–46.6)
HGB BLD-MCNC: 11.6 G/DL (ref 12–15.9)
HOLD SPECIMEN: NORMAL
IMM GRANULOCYTES # BLD AUTO: 0.01 10*3/MM3 (ref 0–0.05)
IMM GRANULOCYTES NFR BLD AUTO: 0.2 % (ref 0–0.5)
LIPASE SERPL-CCNC: 10 U/L (ref 13–60)
LYMPHOCYTES # BLD AUTO: 2.4 10*3/MM3 (ref 0.7–3.1)
LYMPHOCYTES NFR BLD AUTO: 42.8 % (ref 19.6–45.3)
MAGNESIUM SERPL-MCNC: 1.6 MG/DL (ref 1.6–2.6)
MCH RBC QN AUTO: 30.3 PG (ref 26.6–33)
MCHC RBC AUTO-ENTMCNC: 32.5 G/DL (ref 31.5–35.7)
MCV RBC AUTO: 93.2 FL (ref 79–97)
MONOCYTES # BLD AUTO: 0.37 10*3/MM3 (ref 0.1–0.9)
MONOCYTES NFR BLD AUTO: 6.6 % (ref 5–12)
NEUTROPHILS NFR BLD AUTO: 2.78 10*3/MM3 (ref 1.7–7)
NEUTROPHILS NFR BLD AUTO: 49.5 % (ref 42.7–76)
NRBC BLD AUTO-RTO: 0 /100 WBC (ref 0–0.2)
NT-PROBNP SERPL-MCNC: 83 PG/ML (ref 0–450)
PLATELET # BLD AUTO: 272 10*3/MM3 (ref 140–450)
PMV BLD AUTO: 10.8 FL (ref 6–12)
POTASSIUM SERPL-SCNC: 2.7 MMOL/L (ref 3.5–5.2)
PROT SERPL-MCNC: 6.7 G/DL (ref 6–8.5)
QT INTERVAL: 379 MS
RBC # BLD AUTO: 3.83 10*6/MM3 (ref 3.77–5.28)
SODIUM SERPL-SCNC: 142 MMOL/L (ref 136–145)
TROPONIN I SERPL-MCNC: 0.01 NG/ML (ref 0–0.08)
WBC NRBC COR # BLD: 5.61 10*3/MM3 (ref 3.4–10.8)
WHOLE BLOOD HOLD COAG: NORMAL
WHOLE BLOOD HOLD SPECIMEN: NORMAL

## 2022-12-16 PROCEDURE — 71045 X-RAY EXAM CHEST 1 VIEW: CPT

## 2022-12-16 PROCEDURE — 25010000002 KETOROLAC TROMETHAMINE PER 15 MG: Performed by: EMERGENCY MEDICINE

## 2022-12-16 PROCEDURE — 96372 THER/PROPH/DIAG INJ SC/IM: CPT

## 2022-12-16 PROCEDURE — 93005 ELECTROCARDIOGRAM TRACING: CPT | Performed by: EMERGENCY MEDICINE

## 2022-12-16 PROCEDURE — 83880 ASSAY OF NATRIURETIC PEPTIDE: CPT

## 2022-12-16 PROCEDURE — 99283 EMERGENCY DEPT VISIT LOW MDM: CPT

## 2022-12-16 PROCEDURE — 85025 COMPLETE CBC W/AUTO DIFF WBC: CPT

## 2022-12-16 PROCEDURE — 83735 ASSAY OF MAGNESIUM: CPT

## 2022-12-16 PROCEDURE — 80053 COMPREHEN METABOLIC PANEL: CPT

## 2022-12-16 PROCEDURE — 93010 ELECTROCARDIOGRAM REPORT: CPT | Performed by: INTERNAL MEDICINE

## 2022-12-16 PROCEDURE — 83690 ASSAY OF LIPASE: CPT

## 2022-12-16 PROCEDURE — 36415 COLL VENOUS BLD VENIPUNCTURE: CPT

## 2022-12-16 PROCEDURE — 84484 ASSAY OF TROPONIN QUANT: CPT

## 2022-12-16 PROCEDURE — 73502 X-RAY EXAM HIP UNI 2-3 VIEWS: CPT

## 2022-12-16 PROCEDURE — 93005 ELECTROCARDIOGRAM TRACING: CPT

## 2022-12-16 RX ORDER — KETOROLAC TROMETHAMINE 30 MG/ML
30 INJECTION, SOLUTION INTRAMUSCULAR; INTRAVENOUS ONCE
Status: COMPLETED | OUTPATIENT
Start: 2022-12-16 | End: 2022-12-16

## 2022-12-16 RX ORDER — HYDROCODONE BITARTRATE AND ACETAMINOPHEN 5; 325 MG/1; MG/1
1 TABLET ORAL EVERY 6 HOURS PRN
Qty: 12 TABLET | Refills: 0 | Status: SHIPPED | OUTPATIENT
Start: 2022-12-16 | End: 2023-02-01

## 2022-12-16 RX ORDER — MELOXICAM 7.5 MG/1
7.5 TABLET ORAL DAILY
Qty: 30 TABLET | Refills: 0 | Status: SHIPPED | OUTPATIENT
Start: 2022-12-16 | End: 2023-02-14

## 2022-12-16 RX ORDER — ASPIRIN 81 MG/1
324 TABLET, CHEWABLE ORAL ONCE
Status: COMPLETED | OUTPATIENT
Start: 2022-12-16 | End: 2022-12-16

## 2022-12-16 RX ORDER — TRAMADOL HYDROCHLORIDE 50 MG/1
50 TABLET ORAL ONCE
Status: COMPLETED | OUTPATIENT
Start: 2022-12-16 | End: 2022-12-16

## 2022-12-16 RX ORDER — SODIUM CHLORIDE 0.9 % (FLUSH) 0.9 %
10 SYRINGE (ML) INJECTION AS NEEDED
Status: DISCONTINUED | OUTPATIENT
Start: 2022-12-16 | End: 2022-12-16 | Stop reason: HOSPADM

## 2022-12-16 RX ADMIN — TRAMADOL HYDROCHLORIDE 50 MG: 50 TABLET, COATED ORAL at 15:13

## 2022-12-16 RX ADMIN — ASPIRIN 324 MG: 81 TABLET, CHEWABLE ORAL at 12:37

## 2022-12-16 RX ADMIN — KETOROLAC TROMETHAMINE 30 MG: 30 INJECTION, SOLUTION INTRAMUSCULAR; INTRAVENOUS at 12:38

## 2022-12-16 NOTE — ED PROVIDER NOTES
"Time: 1:01 PM EST    Chief Complaint: chest pain, hip pain, joint swelling, knee pain      History of Present Illness:  Patient is a 47 y.o. year old female who presents to the emergency department with chest pain, hip pain, joint swelling, knee pain. Patient states that the chest pain started two weeks ago and has been worsening. Patient describes the chest pain as sharp. Patient states that the pain is located left upper anterior chest wall.  Patient states that deep inspiration and cough worsens her chest pain. Patient states that she has medical history of asthma and diabetes. Patient states that she is prescribed metformin for diabetes. Patient also reports of right hip and left knee pain. Patient states that these two pain started on August after she slipped at work. Patient states that she has not taken any medications for her pain. Patient denies any known allergies.       History provided by:  Patient  History limited by: nothing    used: No        Similar Symptoms Previously: yes  Recently seen: no      Patient Care Team  Primary Care Provider: Provider, No Known    Past Medical History:     No Known Allergies  Past Medical History:   Diagnosis Date   • Asthma    • Depression    • Diabetes mellitus (CMS/[facility])    • Hyperlipidemia    • Hypertension    • Injury of back      Past Surgical History:   Procedure Laterality Date   • CARDIAC CATHETERIZATION N/A 8/12/2021    Procedure: Left Heart Cath - R radial;  Surgeon: NNAMDI Hill MD;  Location: [facility] INVASIVE LOCATION;  Service: Cardiology;  Laterality: N/A;   • CARDIAC SURGERY      \"repair of hole in heart\"   • HYSTERECTOMY       Family History   Problem Relation Age of Onset   • Diabetes Mother    • Hypertension Mother    • Hypertension Father    • Diabetes Father        Home Medications:  Prior to Admission medications    Medication Sig Start Date End Date Taking? Authorizing Provider   albuterol sulfate  (90 Base) MCG/ACT " inhaler albuterol sulfate HFA 90 mcg/actuation aerosol inhaler    Rama Sierra MD   amitriptyline (ELAVIL) 50 MG tablet amitriptyline 50 mg tablet    Rama Sierra MD   ARIPiprazole (ABILIFY) 10 MG tablet aripiprazole 10 mg tablet   TAKE 1 TABLET BY MOUTH DAILY    Rama Sierra MD   atorvastatin (LIPITOR) 10 MG tablet atorvastatin 10 mg tablet   TAKE 1 TABLET BY MOUTH EVERY DAY    Rama Sierra MD   benzonatate (TESSALON) 200 MG capsule Take 1 capsule by mouth 3 (Three) Times a Day As Needed for Cough. 7/12/21   Carolina Martines MD   doxycycline (ADOXA) 100 MG tablet Take 1 tablet by mouth 2 (Two) Times a Day. 7/12/21   Carolina Martines MD   escitalopram (LEXAPRO) 10 MG tablet escitalopram 10 mg tablet    Rama Sierra MD   famotidine (PEPCID) 20 MG tablet famotidine 20 mg tablet   TAKE 1 TABLET BY MOUTH TWICE DAILY    Rama Sierra MD   gabapentin (NEURONTIN) 600 MG tablet Every 8 (Eight) Hours.    Rama Sierra MD   metFORMIN (GLUCOPHAGE) 500 MG tablet metformin 500 mg tablet   TAKE 1 TABLET BY MOUTH TWICE DAILY    Rama Sierra MD   montelukast (SINGULAIR) 10 MG tablet montelukast 10 mg tablet   TAKE 1 TABLET BY MOUTH DAILY    Rama Sierra MD   oxyCODONE-acetaminophen (PERCOCET) 5-325 MG per tablet Take 1 tablet by mouth Every 8 (Eight) Hours As Needed. 7/9/21   Rama Sierra MD   potassium chloride 10 MEQ CR tablet Take 2 tablets by mouth 2 (Two) Times a Day. 7/30/21   NNAMDI Hill MD   prazosin (MINIPRESS) 1 MG capsule prazosin 1 mg capsule    Rama Sierra MD   prazosin (MINIPRESS) 2 MG capsule Take 2 mg by mouth Every Night. 6/22/21   Emergency, Nurse Dorene, RN   predniSONE (DELTASONE) 20 MG tablet Take 3 tab po qd x 2 days then take 2 tab po qd x 3 days then take 1 tab po qd x 3 days 7/12/21   Carolina Martines MD   QUEtiapine (SEROquel) 50 MG tablet quetiapine 50 mg tablet   TAKE 1 TABLET BY MOUTH EVERY NIGHT     "Rama Sierra MD   Qvar RediHaler 40 MCG/ACT inhaler Inhale 1 puff 2 (Two) Times a Day. 4/27/21   Rama Sierra MD   Symbicort 80-4.5 MCG/ACT inhaler Inhale 2 puffs 2 (Two) Times a Day. 6/22/21   Rama Sierra MD        Social History:   Social History     Tobacco Use   • Smoking status: Never   • Smokeless tobacco: Never   Vaping Use   • Vaping Use: Never used   Substance Use Topics   • Alcohol use: Never   • Drug use: Never         Review of Systems:  Review of Systems   Constitutional: Negative for chills, diaphoresis and fever.   HENT: Negative for congestion, postnasal drip, rhinorrhea and sore throat.    Eyes: Negative for photophobia.   Respiratory: Negative for cough, chest tightness and shortness of breath.    Cardiovascular: Positive for chest pain. Negative for palpitations and leg swelling.   Gastrointestinal: Negative for abdominal pain, diarrhea, nausea and vomiting.   Genitourinary: Negative for difficulty urinating, dysuria, flank pain, frequency, hematuria and urgency.   Musculoskeletal: Positive for joint swelling. Negative for neck pain and neck stiffness.   Skin: Negative for pallor and rash.   Neurological: Negative for dizziness, syncope, weakness, numbness and headaches.   Hematological: Negative for adenopathy. Does not bruise/bleed easily.   Psychiatric/Behavioral: Negative.         Physical Exam:  /94   Pulse 81   Temp 98 °F (36.7 °C) (Oral)   Resp 16   Ht 170.2 cm (67\")   Wt 90.2 kg (198 lb 13.7 oz)   SpO2 99%   BMI 31.15 kg/m²     Physical Exam  Vitals and nursing note reviewed.   Constitutional:       General: She is not in acute distress.     Appearance: Normal appearance. She is not ill-appearing, toxic-appearing or diaphoretic.   HENT:      Head: Normocephalic and atraumatic.      Mouth/Throat:      Mouth: Mucous membranes are moist.   Eyes:      Pupils: Pupils are equal, round, and reactive to light.   Cardiovascular:      Rate and Rhythm: Normal " rate and regular rhythm.      Pulses: Normal pulses.           Carotid pulses are 2+ on the right side and 2+ on the left side.       Radial pulses are 2+ on the right side and 2+ on the left side.        Femoral pulses are 2+ on the right side and 2+ on the left side.       Popliteal pulses are 2+ on the right side and 2+ on the left side.        Dorsalis pedis pulses are 2+ on the right side and 2+ on the left side.        Posterior tibial pulses are 2+ on the right side and 2+ on the left side.      Heart sounds: Normal heart sounds. No murmur heard.  Pulmonary:      Effort: Pulmonary effort is normal. No accessory muscle usage, respiratory distress or retractions.      Breath sounds: Normal breath sounds. No wheezing, rhonchi or rales.   Chest:      Chest wall: Tenderness (reproducible with palpation on left mid and upper ) present.   Abdominal:      General: Abdomen is flat. There is no distension.      Palpations: Abdomen is soft. There is no mass.      Tenderness: There is no abdominal tenderness. There is no right CVA tenderness, left CVA tenderness, guarding or rebound.      Comments: No rigidity   Musculoskeletal:         General: No swelling, tenderness or deformity.      Cervical back: Neck supple. No tenderness.      Right hip: Normal range of motion.      Left knee: Normal range of motion.      Right lower leg: No edema.      Left lower leg: No edema.      Comments: Full ROM of the extremities    Skin:     General: Skin is warm and dry.      Capillary Refill: Capillary refill takes less than 2 seconds.      Coloration: Skin is not jaundiced or pale.      Findings: No erythema, lesion or rash.      Comments: No shingles    Neurological:      General: No focal deficit present.      Mental Status: She is alert and oriented to person, place, and time. Mental status is at baseline.      Sensory: No sensory deficit.      Motor: No weakness.   Psychiatric:         Mood and Affect: Mood normal.          Behavior: Behavior normal.                Medications in the Emergency Department:  Medications   aspirin chewable tablet 324 mg (324 mg Oral Given 12/16/22 1237)   ketorolac (TORADOL) injection 30 mg (30 mg Intramuscular Given 12/16/22 1238)   traMADol (ULTRAM) tablet 50 mg (50 mg Oral Given 12/16/22 1513)        Labs  Lab Results (last 24 hours)     Procedure Component Value Units Date/Time    POC Troponin I with Hold Tube [[id number]] Collected: 12/16/22 1147    Specimen: Blood Updated: 12/16/22 1223    Narrative:      The following orders were created for panel order POC Troponin I with Hold Tube.  Procedure                               Abnormality         Status                     ---------                               -----------         ------                     POC Troponin I[[id number]]                                                              HOLD Troponin-I Tube[[id number]]                             Final result                 Please view results for these tests on the individual orders.    CBC & Differential [[id number]]  (Abnormal) Collected: 12/16/22 1147    Specimen: Blood Updated: 12/16/22 1152    Narrative:      The following orders were created for panel order CBC & Differential.  Procedure                               Abnormality         Status                     ---------                               -----------         ------                     CBC Auto Differential[[id number]]        Abnormal            Final result                 Please view results for these tests on the individual orders.    Comprehensive Metabolic Panel [[id number]]  (Abnormal) Collected: 12/16/22 1147    Specimen: Blood Updated: 12/16/22 1218     Glucose 176 mg/dL      BUN 8 mg/dL      Creatinine 0.83 mg/dL      Sodium 142 mmol/L      Potassium 2.7 mmol/L      Chloride 103 mmol/L      CO2 25.3 mmol/L      Calcium 8.8 mg/dL      Total Protein 6.7 g/dL      Albumin 4.00 g/dL      ALT (SGPT) 16 U/L      AST (SGOT) 14  U/L      Alkaline Phosphatase 85 U/L      Total Bilirubin 0.3 mg/dL      Globulin 2.7 gm/dL      A/G Ratio 1.5 g/dL      BUN/Creatinine Ratio 9.6     Anion Gap 13.7 mmol/L      eGFR 87.1 mL/min/1.73      Comment: National Kidney Foundation and American Society of Nephrology (ASN) Task Force recommended calculation based on the Chronic Kidney Disease Epidemiology Collaboration (CKD-EPI) equation refit without adjustment for race.       Narrative:      GFR Normal >60  Chronic Kidney Disease <60  Kidney Failure <15      Lipase [[id number]]  (Abnormal) Collected: 12/16/22 1147    Specimen: Blood Updated: 12/16/22 1218     Lipase 10 U/L     BNP [[id number]]  (Normal) Collected: 12/16/22 1147    Specimen: Blood Updated: 12/16/22 1213     proBNP 83.0 pg/mL     Narrative:      Among patients with dyspnea, NT-proBNP is highly sensitive for the detection of acute congestive heart failure. In addition NT-proBNP of <300 pg/ml effectively rules out acute congestive heart failure with 99% negative predictive value.      Magnesium [[id number]]  (Normal) Collected: 12/16/22 1147    Specimen: Blood Updated: 12/16/22 1218     Magnesium 1.6 mg/dL     CBC Auto Differential [[id number]]  (Abnormal) Collected: 12/16/22 1147    Specimen: Blood Updated: 12/16/22 1152     WBC 5.61 10*3/mm3      RBC 3.83 10*6/mm3      Hemoglobin 11.6 g/dL      Hematocrit 35.7 %      MCV 93.2 fL      MCH 30.3 pg      MCHC 32.5 g/dL      RDW 15.0 %      RDW-SD 51.0 fl      MPV 10.8 fL      Platelets 272 10*3/mm3      Neutrophil % 49.5 %      Lymphocyte % 42.8 %      Monocyte % 6.6 %      Eosinophil % 0.7 %      Basophil % 0.2 %      Immature Grans % 0.2 %      Neutrophils, Absolute 2.78 10*3/mm3      Lymphocytes, Absolute 2.40 10*3/mm3      Monocytes, Absolute 0.37 10*3/mm3      Eosinophils, Absolute 0.04 10*3/mm3      Basophils, Absolute 0.01 10*3/mm3      Immature Grans, Absolute 0.01 10*3/mm3      nRBC 0.0 /100 WBC     POC Troponin I [[id number]]  (Normal)  Collected: 12/16/22 1151    Specimen: Blood Updated: 12/16/22 1203     Troponin I 0.01 ng/mL      Comment: Serial Number: [id number]:  [id number]              Imaging:  XR Chest 1 View    Result Date: 12/16/2022  PROCEDURE: XR CHEST 1 VW  COMPARISON: [facility], CR, XR CHEST 2 VW, 7/12/2021, 15:35.  INDICATIONS: Chest Pain Triage Protocol  FINDINGS:  The cardiac silhouette is within normal limits.  Pulmonary vascularity appears normal.  There is no focal airspace consolidation, pleural effusion, or pneumothorax.  Small median sternotomy wires are present.  There are no acute osseous findings of the chest.        1. No acute cardiopulmonary abnormality.  No acute change from prior exam.        MARK ANTHONY ALVAREZ MD       Electronically Signed and Approved By: MARK ANTHONY ALVAREZ MD on 12/16/2022 at 12:16             XR Hip With or Without Pelvis 2 - 3 View Right    Result Date: 12/16/2022  PROCEDURE: XR HIP W OR WO PELVIS 2-3 VIEW RIGHT  COMPARISON: [facility], , RIGHT HIP/PELVIS, 2/03/2019, 11:14.  INDICATIONS: right hip pain, fell in july  FINDINGS:  The sacrum, pelvis, and proximal femurs appear intact.  Advanced degenerative change consistent with osteoarthritis in the right hip is significantly worsened in comparison to 2/3/2019.  Moderate degenerative change in the left hip appears worsened.  No lytic or sclerotic bone lesions are seen.        Hip series with AP pelvis demonstrating advanced degenerative change consistent with osteoarthritis, worsened in comparison to 2/3/2019.  Moderate degenerative change in the left hip appears worsened.      ALEC POOLE MD       Electronically Signed and Approved By: ALEC POOLE MD on 12/16/2022 at 12:35               Procedures:  Procedures    Progress  ED Course as of 12/17/22 1001   Fri Dec 16, 2022   1206 --- PROVIDER IN TRIAGE NOTE ---    The patient was evaluated my me, Matilde Lowe in triage. Orders were placed and the patient is currently  awaiting disposition.    [AJ]   1207 Open heart surgery in 1981, see's [facility].   Cough since august, dry cough.   Denies N/V/F/C  [AJ]      ED Course User Index  [AJ] Matilde Lowe PA-C                      EKG performed at 1149 was interpreted by me to show a normal sinus rhythm with a ventricular rate of 77 bpm.  The CT interval was 128 ms.  P waves are suggestive of left atrial enlargement.  QRS interval is 118 ms.  Patient noted have a nonspecific intraventricular conduction delay.  Mitchell is at 33 degrees.  There are some nonspecific ST-T wave change identified.  QT corrected is 430 ms.      Medical Decision Making:  MDM   The patient was seen and evaluated in the ED by me.  The above history and physical examination was performed as documented.  Patient essentially had a couple separate complaints.  1 being her chest pain that are not history, physical exam, and ED work-up is consistent with more of a costochondritis or chest wall pain.  Her second complaint is her chronic hip and knee pains.  Patient was wanting an x-ray of her hip which she reports [place] and never was an MRI of her knee.  I explained to her that I cannot do an MRI acutely in the ER and I be happy to do the x-ray.  Radiographic studies show osteoarthritic changes which would also explain the patient's pain complaints.  Patient will be given the name of of her on-call orthopedic surgeon but she was instructed she can follow-up with any orthopedic surgeon of choice.  Patient was also instructed to follow-up with her primary care.  Patient is stable for discharge home.    Final diagnoses:   Costochondritis   Osteoarthritis of right hip, unspecified osteoarthritis type        Disposition:  ED Disposition     ED Disposition   Discharge    Condition   Stable    Comment   --             This medical record created using voice recognition software.        Documentation assistance provided by  Megan Simms acting as scribe for No att. providers found. Information recorded by the scribe was done at my direction and has been verified and validated by me.          Megan Simms  12/16/22 1321       Gurvinder Gould DO  12/17/22 1001

## 2022-12-16 NOTE — DISCHARGE INSTRUCTIONS
Avoiding strenuous activities.  Take the prescriptions as prescribed.  Follow-up with primary care provider.  Call for follow-up appointment.  Also follow-up with orthopedics in regards to her osteoarthritis of your hip and knees.  Return to the ER for any new or changing concerns.

## 2022-12-21 ENCOUNTER — OFFICE VISIT (OUTPATIENT)
Dept: ORTHOPEDIC SURGERY | Facility: CLINIC | Age: 47
End: 2022-12-21

## 2022-12-21 VITALS — HEIGHT: 67 IN | WEIGHT: 208 LBS | OXYGEN SATURATION: 99 % | HEART RATE: 99 BPM | BODY MASS INDEX: 32.65 KG/M2

## 2022-12-21 DIAGNOSIS — M16.0 PRIMARY OSTEOARTHRITIS OF BOTH HIPS: ICD-10-CM

## 2022-12-21 DIAGNOSIS — M25.562 LEFT KNEE PAIN, UNSPECIFIED CHRONICITY: ICD-10-CM

## 2022-12-21 DIAGNOSIS — M25.561 RIGHT KNEE PAIN, UNSPECIFIED CHRONICITY: Primary | ICD-10-CM

## 2022-12-21 DIAGNOSIS — M70.61 TROCHANTERIC BURSITIS OF RIGHT HIP: ICD-10-CM

## 2022-12-21 DIAGNOSIS — M17.0 BILATERAL PRIMARY OSTEOARTHRITIS OF KNEE: ICD-10-CM

## 2022-12-21 PROCEDURE — 99203 OFFICE O/P NEW LOW 30 MIN: CPT | Performed by: STUDENT IN AN ORGANIZED HEALTH CARE EDUCATION/TRAINING PROGRAM

## 2022-12-21 RX ORDER — DICLOFENAC SODIUM 75 MG/1
75 TABLET, DELAYED RELEASE ORAL 2 TIMES DAILY
Qty: 60 TABLET | Refills: 1 | Status: SHIPPED | OUTPATIENT
Start: 2022-12-21 | End: 2023-02-14

## 2022-12-21 NOTE — PROGRESS NOTES
"Chief Complaint  Pain and Initial Evaluation of the Left Knee and Pain and Initial Evaluation of the Right Knee    Subjective          Casi Lopez presents to [facility] for   History of Present Illness    The patient presents here today for evaluation of the bilateral knees. She reports bilateral knee pain since August after a couple falls. She has tried naproxen, ibuprofen and tylenol without relief. She admits to popping and swelling. She reports her left is worse than the right. She has no other complaints.   No Known Allergies     Social History     Socioeconomic History   • Marital status:    Tobacco Use   • Smoking status: Never   • Smokeless tobacco: Never   Vaping Use   • Vaping Use: Never used   Substance and Sexual Activity   • Alcohol use: Never   • Drug use: Never        I reviewed the patient's chief complaint, history of present illness, review of systems, past medical history, surgical history, family history, social history, medications, and allergy list.     REVIEW OF SYSTEMS    Constitutional: Denies fevers, chills, weight loss  Cardiovascular: Denies chest pain, shortness of breath  Skin: Denies rashes, acute skin changes  Neurologic: Denies headache, loss of consciousness  MSK: bilateral knees      Objective   Vital Signs:   Pulse 99   Ht 170.2 cm (67\")   Wt 94.3 kg (208 lb)   SpO2 99%   BMI 32.58 kg/m²     Body mass index is 32.58 kg/m².    Physical Exam    General: Alert. No acute distress.   Right knee- ROM 0-120 degrees. Stable to varus/valgus stress. Stable to anterior/posterior drawer. Mild crepitus. Negative Lachman's. Medial joint line and lateral joint line tenderness. Mild pain, no pop with Sangeetha's. Positive EHL, FHL, GS and TA. Sensation intact to all 5 nerves of the foot. Positive pulses. Pain in hip with motion. Tender to the trochanteric bursa. Knee Extensor Mechanism  Intact.     Left knee- ROM 0-120 degrees. Mild crepitus. Stable to " varus/valgus stress. Stable to anterior/posterior drawer. Negative Lachman's. Medial joint line and lateral joint line tenderness. Mild pain with Sangeetha's, no pop. No pain with hip motion. Positive EHL, FHL, GS and TA. Sensation intact to all 5 nerves of the foot. Positive pulses. Knee Extensor Mechanism  Intact.     Procedures    Imaging Results (Most Recent)     Procedure Component Value Units Date/Time    XR Knee 4+ View Left [[id number]] Resulted: 12/21/22 1453     Updated: 12/21/22 1453    Narrative:      Indications: Left knee pain    Views: Weightbearing AP, PA flexion, lateral, sunrise left knee    Findings: Moderate arthritic changes are seen predominantly in the medial   and patellofemoral compartments.  No fractures.  Alignment neutral.    Comparative Data: No comparative data available      XR Knee 4+ View Right [[id number]] Resulted: 12/21/22 1452     Updated: 12/21/22 1453    Narrative:      Indications: Right knee pain    Views: Weightbearing AP, PA flexion, lateral, sunrise right knee    Findings: Moderate arthritic changes are seen predominantly in the medial   and patellofemoral compartments.  No fractures.  Alignment neutral.    Comparative Data: No comparative data available                     Assessment and Plan        XR Knee 4+ View Left    Result Date: 12/21/2022  Narrative: Indications: Left knee pain Views: Weightbearing AP, PA flexion, lateral, sunrise left knee Findings: Moderate arthritic changes are seen predominantly in the medial and patellofemoral compartments.  No fractures.  Alignment neutral. Comparative Data: No comparative data available     XR Knee 4+ View Right    Result Date: 12/21/2022  Narrative: Indications: Right knee pain Views: Weightbearing AP, PA flexion, lateral, sunrise right knee Findings: Moderate arthritic changes are seen predominantly in the medial and patellofemoral compartments.  No fractures.  Alignment neutral. Comparative Data: No comparative data  available     XR Chest 1 View    Result Date: 12/16/2022  Narrative: PROCEDURE: XR CHEST 1 VW  COMPARISON: [facility], CR, XR CHEST 2 VW, 7/12/2021, 15:35.  INDICATIONS: Chest Pain Triage Protocol  FINDINGS:  The cardiac silhouette is within normal limits.  Pulmonary vascularity appears normal.  There is no focal airspace consolidation, pleural effusion, or pneumothorax.  Small median sternotomy wires are present.  There are no acute osseous findings of the chest.      Impression:   1. No acute cardiopulmonary abnormality.  No acute change from prior exam.        MARK ANTHONY ALVAREZ MD       Electronically Signed and Approved By: MARK ANTHONY ALVAREZ MD on 12/16/2022 at 12:16             XR Hip With or Without Pelvis 2 - 3 View Right    Result Date: 12/16/2022  Narrative: PROCEDURE: XR HIP W OR WO PELVIS 2-3 VIEW RIGHT  COMPARISON: [facility], , RIGHT HIP/PELVIS, 2/03/2019, 11:14.  INDICATIONS: right hip pain, fell in july  FINDINGS:  The sacrum, pelvis, and proximal femurs appear intact.  Advanced degenerative change consistent with osteoarthritis in the right hip is significantly worsened in comparison to 2/3/2019.  Moderate degenerative change in the left hip appears worsened.  No lytic or sclerotic bone lesions are seen.      Impression:   Hip series with AP pelvis demonstrating advanced degenerative change consistent with osteoarthritis, worsened in comparison to 2/3/2019.  Moderate degenerative change in the left hip appears worsened.      ALEC POOLE MD       Electronically Signed and Approved By: ALEC POOLE MD on 12/16/2022 at 12:35                Diagnoses and all orders for this visit:    1. Right knee pain, unspecified chronicity (Primary)  -     XR Knee 4+ View Right  -     diclofenac (VOLTAREN) 75 MG EC tablet; Take 1 tablet by mouth 2 (Two) Times a Day.  Dispense: 60 tablet; Refill: 1    2. Left knee pain, unspecified chronicity  -     XR Knee 4+ View Left  -     diclofenac (VOLTAREN)  75 MG EC tablet; Take 1 tablet by mouth 2 (Two) Times a Day.  Dispense: 60 tablet; Refill: 1    3. Bilateral primary osteoarthritis of knee  -     diclofenac (VOLTAREN) 75 MG EC tablet; Take 1 tablet by mouth 2 (Two) Times a Day.  Dispense: 60 tablet; Refill: 1    4. Trochanteric bursitis of right hip  -     diclofenac (VOLTAREN) 75 MG EC tablet; Take 1 tablet by mouth 2 (Two) Times a Day.  Dispense: 60 tablet; Refill: 1    5. Primary osteoarthritis of both hips  -     diclofenac (VOLTAREN) 75 MG EC tablet; Take 1 tablet by mouth 2 (Two) Times a Day.  Dispense: 60 tablet; Refill: 1    Discussed the treatment plan with the patient.  I reviewed the x-rays that were obtained today with the patient. The patient has bilateral knee and hip osteoarthritis. Plan for conservative treatment at this time. I discussed with her that her right hip has the worse arthritis. Prescription for diclofenac given today. Home exercises given today.       Call or return if worsening symptoms.    Scribed for Elias Gruber MD by Renetta Carrillo  12/21/2022   14:05 EST         Follow Up       6 weeks    Patient was given instructions and counseling regarding her condition or for health maintenance advice. Please see specific information pulled into the AVS if appropriate.       I have personally performed the services described in this document as scribed by the above individual and it is both accurate and complete.     Elias Gruber MD  12/22/22  08:28 EST

## 2023-02-01 ENCOUNTER — TRANSCRIBE ORDERS (OUTPATIENT)
Dept: ADMINISTRATIVE | Facility: HOSPITAL | Age: 48
End: 2023-02-01
Payer: COMMERCIAL

## 2023-02-01 ENCOUNTER — OFFICE VISIT (OUTPATIENT)
Dept: ORTHOPEDIC SURGERY | Facility: CLINIC | Age: 48
End: 2023-02-01
Payer: COMMERCIAL

## 2023-02-01 VITALS — HEIGHT: 67 IN | WEIGHT: 206 LBS | BODY MASS INDEX: 32.33 KG/M2

## 2023-02-01 DIAGNOSIS — M16.0 PRIMARY OSTEOARTHRITIS OF BOTH HIPS: ICD-10-CM

## 2023-02-01 DIAGNOSIS — N95.1 MENOPAUSAL STATE: Primary | ICD-10-CM

## 2023-02-01 DIAGNOSIS — M16.10 PRIMARY OSTEOARTHRITIS OF HIP, UNSPECIFIED LATERALITY: ICD-10-CM

## 2023-02-01 DIAGNOSIS — M17.0 BILATERAL PRIMARY OSTEOARTHRITIS OF KNEE: ICD-10-CM

## 2023-02-01 DIAGNOSIS — M70.61 TROCHANTERIC BURSITIS OF RIGHT HIP: Primary | ICD-10-CM

## 2023-02-01 PROCEDURE — 99213 OFFICE O/P EST LOW 20 MIN: CPT | Performed by: PHYSICIAN ASSISTANT

## 2023-02-01 RX ORDER — ATORVASTATIN CALCIUM 20 MG/1
20 TABLET, FILM COATED ORAL
COMMUNITY
Start: 2023-01-18

## 2023-02-01 RX ORDER — VITAMIN E 268 MG
1 CAPSULE ORAL DAILY
COMMUNITY
Start: 2023-01-18

## 2023-02-01 RX ORDER — ATENOLOL 50 MG/1
1 TABLET ORAL DAILY
COMMUNITY
Start: 2023-01-18

## 2023-02-01 RX ORDER — EMPAGLIFLOZIN 10 MG/1
1 TABLET, FILM COATED ORAL DAILY
COMMUNITY
Start: 2023-01-18

## 2023-02-01 NOTE — PROGRESS NOTES
"Chief Complaint  Follow-up of the Right Knee, Follow-up of the Left Knee, and Follow-up of the Right Hip    Subjective          Casi Lopez presents to [facility]   History of Present Illness    Casi Lopez presents today for a follow-up of her bilateral knees and right hip.  Patient has bilateral knee arthritis, bilateral hip arthritis, and right hip trochanteric bursitis that we are treating conservatively.  Today, patient states that she continues to experience bilateral knee and right hip pain.  She states that her right hip range of motion has decreased.  She explains it is hard to rise from a chair.  She has been taking diclofenac with no significant relief.  She states that she is doing home exercises which have provided some relief.  Patient affirms a new fall yesterday where she landed on her right hip.  She reports bilateral lower extremity numbness and tingling at times.      No Known Allergies     Social History     Socioeconomic History   • Marital status:    Tobacco Use   • Smoking status: Never   • Smokeless tobacco: Never   Vaping Use   • Vaping Use: Never used   Substance and Sexual Activity   • Alcohol use: Never   • Drug use: Never        I reviewed the patient's chief complaint, history of present illness, review of systems, past medical history, surgical history, family history, social history, medications, and allergy list.     REVIEW OF SYSTEMS    Constitutional: Denies fevers, chills, weight loss  Cardiovascular: Denies chest pain, shortness of breath  Skin: Denies rashes, acute skin changes  Neurologic: Denies headache, loss of consciousness  MSK: Bilateral knee pain.  Right hip pain.      Objective   Vital Signs:   Ht 170.2 cm (67\")   Wt 93.4 kg (206 lb)   BMI 32.26 kg/m²     Body mass index is 32.26 kg/m².    Physical Exam    General: Alert. No acute distress.   Right lower extremity: Tenderness to palpation over the greater trochanteric " bursa.  Pain with passive hip range of motion.  Hip flexion intact.  Hip flexion 110.  5 out of 5 hip abduction.  Knee extensor mechanism intact.  Full knee extension.  Knee flexion 120 degrees.  Knee stable to varus and valgus stress.  Tenderness to palpation of the medial joint line.  Nontender to lateral joint line.  Calf soft, nontender.  Demonstrates active ankle plantarflexion and dorsiflexion.  Sensation intact to the dorsal and plantar foot.  Palpable pedal pulses.    Left lower extremity: Tenderness to palpation of the medial and lateral joint lines.  5 degrees shy of full knee extension.  Knee flexion 120 degrees.  Knee extensor mechanism intact.  Knee stable to varus valgus stress.  Calf soft, nontender.  Hip flexion intact.  No pain on passive hip range of motion.  Demonstrates active ankle plantarflexion dorsiflexion.  Sensation intact to the dorsal and plantar foot.  Palpable pedal pulses.    Procedures    Imaging Results (Most Recent)     Procedure Component Value Units Date/Time    XR Hip With or Without Pelvis 2 - 3 View Right [[id number]] Resulted: 02/01/23 1544     Updated: 02/01/23 1545    Narrative:      Indications: Follow-up right hip arthritis    Views: AP and frog lateral right hip    Findings: Advanced arthritic changes are seen to the right hip.  Hip is   reduced.  No fractures or dislocations.    Comparative Data: Comparative data found and reviewed today.                   Assessment and Plan    Diagnoses and all orders for this visit:    1. Trochanteric bursitis of right hip (Primary)  -     XR Hip With or Without Pelvis 2 - 3 View Right  -     FL Guide For Pain Meds Injection Joint; Future    2. Bilateral primary osteoarthritis of knee    3. Primary osteoarthritis of both hips        Casi Lopez presents today for follow-up of her bilateral knee arthritis, bilateral hip arthritis, and right hip trochanteric bursitis that we are treating conservatively.  X-rays were reviewed with the  patient today.  A right hip intra-articular injection was ordered today.  We discussed bilateral knee steroid injections in the future if necessary.  Patient instructed to continue with her home exercises.  Continue with diclofenac.      Patient will follow up in 6 weeks for reevaluation.       Call or return if symptoms worsen or patient has any concerns.       Follow Up   Return in about 6 weeks (around 3/15/2023).  Patient was given instructions and counseling regarding her condition or for health maintenance advice. Please see specific information pulled into the AVS if appropriate.     Luci Guaman PA-C  02/01/23  16:15 EST

## 2023-02-09 ENCOUNTER — HOSPITAL ENCOUNTER (OUTPATIENT)
Dept: INTERVENTIONAL RADIOLOGY/VASCULAR | Facility: HOSPITAL | Age: 48
Discharge: HOME | End: 2023-02-09
Admitting: PHYSICIAN ASSISTANT
Payer: COMMERCIAL

## 2023-02-09 DIAGNOSIS — M70.61 TROCHANTERIC BURSITIS OF RIGHT HIP: ICD-10-CM

## 2023-02-09 PROCEDURE — 25010000002 METHYLPREDNISOLONE PER 80 MG: Performed by: PHYSICIAN ASSISTANT

## 2023-02-09 PROCEDURE — 25010000002 IOPAMIDOL 61 % SOLUTION: Performed by: PHYSICIAN ASSISTANT

## 2023-02-09 PROCEDURE — 77002 NEEDLE LOCALIZATION BY XRAY: CPT

## 2023-02-09 RX ORDER — LIDOCAINE HYDROCHLORIDE 20 MG/ML
20 INJECTION, SOLUTION INFILTRATION; PERINEURAL ONCE
Status: COMPLETED | OUTPATIENT
Start: 2023-02-09 | End: 2023-02-09

## 2023-02-09 RX ORDER — BUPIVACAINE HYDROCHLORIDE 5 MG/ML
10 INJECTION, SOLUTION PERINEURAL ONCE
Status: COMPLETED | OUTPATIENT
Start: 2023-02-09 | End: 2023-02-09

## 2023-02-09 RX ORDER — METHYLPREDNISOLONE ACETATE 80 MG/ML
80 INJECTION, SUSPENSION INTRA-ARTICULAR; INTRALESIONAL; INTRAMUSCULAR; SOFT TISSUE ONCE
Status: COMPLETED | OUTPATIENT
Start: 2023-02-09 | End: 2023-02-09

## 2023-02-09 RX ADMIN — BUPIVACAINE HYDROCHLORIDE 4 ML: 5 INJECTION, SOLUTION PERINEURAL at 10:48

## 2023-02-09 RX ADMIN — IOPAMIDOL 15 ML: 612 INJECTION, SOLUTION INTRATHECAL at 10:47

## 2023-02-09 RX ADMIN — LIDOCAINE HYDROCHLORIDE 9 ML: 20 INJECTION, SOLUTION INFILTRATION; PERINEURAL at 10:45

## 2023-02-09 RX ADMIN — METHYLPREDNISOLONE ACETATE 80 MG: 80 INJECTION, SUSPENSION INTRA-ARTICULAR; INTRALESIONAL; INTRAMUSCULAR; SOFT TISSUE at 10:48

## 2023-02-09 RX ADMIN — SODIUM BICARBONATE 1 MEQ: 84 INJECTION, SOLUTION INTRAVENOUS at 10:45

## 2023-02-13 ENCOUNTER — HOSPITAL ENCOUNTER (OUTPATIENT)
Dept: BONE DENSITY | Facility: HOSPITAL | Age: 48
Discharge: HOME | End: 2023-02-13
Admitting: NURSE PRACTITIONER
Payer: COMMERCIAL

## 2023-02-13 DIAGNOSIS — M16.10 PRIMARY OSTEOARTHRITIS OF HIP, UNSPECIFIED LATERALITY: ICD-10-CM

## 2023-02-13 DIAGNOSIS — N95.1 MENOPAUSAL STATE: ICD-10-CM

## 2023-02-13 PROCEDURE — 77080 DXA BONE DENSITY AXIAL: CPT

## 2023-02-14 DIAGNOSIS — M16.0 PRIMARY OSTEOARTHRITIS OF BOTH HIPS: ICD-10-CM

## 2023-02-14 DIAGNOSIS — M25.562 LEFT KNEE PAIN, UNSPECIFIED CHRONICITY: ICD-10-CM

## 2023-02-14 DIAGNOSIS — M17.0 BILATERAL PRIMARY OSTEOARTHRITIS OF KNEE: ICD-10-CM

## 2023-02-14 DIAGNOSIS — M25.561 RIGHT KNEE PAIN, UNSPECIFIED CHRONICITY: ICD-10-CM

## 2023-02-14 DIAGNOSIS — M70.61 TROCHANTERIC BURSITIS OF RIGHT HIP: ICD-10-CM

## 2023-02-14 RX ORDER — DICLOFENAC SODIUM 75 MG/1
TABLET, DELAYED RELEASE ORAL
Qty: 60 TABLET | Refills: 1 | Status: SHIPPED | OUTPATIENT
Start: 2023-02-14 | End: 2023-03-24

## 2023-02-20 ENCOUNTER — OFFICE VISIT (OUTPATIENT)
Dept: ORTHOPEDIC SURGERY | Facility: CLINIC | Age: 48
End: 2023-02-20
Payer: COMMERCIAL

## 2023-02-20 VITALS — BODY MASS INDEX: 29.66 KG/M2 | WEIGHT: 189 LBS | HEIGHT: 67 IN

## 2023-02-20 DIAGNOSIS — M16.0 PRIMARY OSTEOARTHRITIS OF BOTH HIPS: Primary | ICD-10-CM

## 2023-02-20 DIAGNOSIS — M17.0 BILATERAL PRIMARY OSTEOARTHRITIS OF KNEE: ICD-10-CM

## 2023-02-20 DIAGNOSIS — M70.61 TROCHANTERIC BURSITIS OF RIGHT HIP: ICD-10-CM

## 2023-02-20 PROCEDURE — 20610 DRAIN/INJ JOINT/BURSA W/O US: CPT | Performed by: PHYSICIAN ASSISTANT

## 2023-02-20 PROCEDURE — 99213 OFFICE O/P EST LOW 20 MIN: CPT | Performed by: PHYSICIAN ASSISTANT

## 2023-02-20 RX ORDER — TRIAMCINOLONE ACETONIDE 40 MG/ML
40 INJECTION, SUSPENSION INTRA-ARTICULAR; INTRAMUSCULAR
Status: COMPLETED | OUTPATIENT
Start: 2023-02-20 | End: 2023-02-20

## 2023-02-20 RX ORDER — LIDOCAINE HYDROCHLORIDE 10 MG/ML
5 INJECTION, SOLUTION INFILTRATION; PERINEURAL
Status: COMPLETED | OUTPATIENT
Start: 2023-02-20 | End: 2023-02-20

## 2023-02-20 RX ADMIN — TRIAMCINOLONE ACETONIDE 40 MG: 40 INJECTION, SUSPENSION INTRA-ARTICULAR; INTRAMUSCULAR at 14:31

## 2023-02-20 RX ADMIN — LIDOCAINE HYDROCHLORIDE 5 ML: 10 INJECTION, SOLUTION INFILTRATION; PERINEURAL at 14:31

## 2023-02-20 NOTE — PROGRESS NOTES
"Chief Complaint  Pain and Follow-up of the Right Hip, Pain and Follow-up of the Right Knee, and Pain and Follow-up of the Left Knee    Subjective          Casi Lopez presents to [facility]   History of Present Illness    Casi Lopez presents today for a follow-up of her bilateral hips and bilateral knees.  Patient has bilateral knee arthritis, bilateral hip arthritis, and right hip trochanteric bursitis that we have been treating conservatively.  Today, patient reports continued bilateral hip and knee pain.  She states that her right hip is worse than her left hip and her left knee is worse than her right knee.  She received a right hip intra-articular injection about 2 weeks ago and reports no relief from this injection.  She describes difficulty rising from a chair and pain with ambulation.  She has been taking diclofenac with no relief.  She denies new injuries.  Affirms bilateral lower extremity numbness and tingling at times.      No Known Allergies     Social History     Socioeconomic History   • Marital status:    Tobacco Use   • Smoking status: Never   • Smokeless tobacco: Never   Vaping Use   • Vaping Use: Never used   Substance and Sexual Activity   • Alcohol use: Never   • Drug use: Never        I reviewed the patient's chief complaint, history of present illness, review of systems, past medical history, surgical history, family history, social history, medications, and allergy list.     REVIEW OF SYSTEMS    Constitutional: Denies fevers, chills, weight loss  Cardiovascular: Denies chest pain, shortness of breath  Skin: Denies rashes, acute skin changes  Neurologic: Denies headache, loss of consciousness  MSK: Bilateral knee pain.  Bilateral hip pain.      Objective   Vital Signs:   Ht 170.2 cm (67\")   Wt 85.7 kg (189 lb)   BMI 29.60 kg/m²     Body mass index is 29.6 kg/m².    Physical Exam    General: Alert. No acute distress.   Right lower extremity: Pain " with passive hip range of motion.  Hip flexion intact.  5 out of 5 hip abduction with associated pain.  Knee extensor mechanism intact.  Full knee extension.  Knee flexion 120.  Patellar crepitus with knee range of motion.  Knee stable to varus and valgus stress.  Calf soft, nontender.  Demonstrates active ankle plantarflexion and dorsiflexion.  Sensation intact over the dorsal and plantar foot.  Palpable pedal pulses.    Left lower extremity: Pain with passive hip range of motion.  Heart 5 hip abduction.  Hip flexion intact.  Full knee extension.  Knee flexion 120.  Patellar crepitus with motion.  Knee extensor mechanism intact.  Knee stable to varus and valgus stress.  Calf soft, nontender.  Demonstrates active ankle plantarflexion dorsiflexion.  Sensation intact over the dorsal and plantar foot.  Palpable pedal pulses.    Large Joint Arthrocentesis: R knee  Date/Time: 2/20/2023 2:31 PM  Consent given by: patient  Site marked: site marked  Timeout: Immediately prior to procedure a time out was called to verify the correct patient, procedure, equipment, support staff and site/side marked as required   Supporting Documentation  Indications: pain   Procedure Details  Location: knee - R knee  Needle gauge: 21g.  Medications administered: 40 mg triamcinolone acetonide 40 MG/ML; 5 mL lidocaine 1 %  Patient tolerance: patient tolerated the procedure well with no immediate complications          Imaging Results (Most Recent)     None                 Assessment and Plan    Diagnoses and all orders for this visit:    1. Primary osteoarthritis of both hips (Primary)    2. Bilateral primary osteoarthritis of knee  -     Large Joint Arthrocentesis: R knee  -     triamcinolone acetonide (KENALOG-40) injection 40 mg  -     lidocaine (XYLOCAINE) 1 % injection 5 mL    3. Trochanteric bursitis of right hip        Casi Lopez presents today for follow-up of her bilateral knee arthritis, bilateral hip arthritis, and right hip  trochanteric bursitis that we have been treating conservatively.  Patient reports no relief from nonoperative measures to her right hip, including a right hip intra-articular injection.  She is instructed to continue with home exercises for her hips and knees.  Continue with diclofenac. We discussed the risks and benefits with the patient regarding left knee steroid injection. Patient understood and elected to proceed. Patient tolerated injection well with no complications.       Patient will follow up in 6 weeks for reevaluation with Dr. Gruber.  We will obtain new x-rays of the right hip at next visit.      Call or return if symptoms worsen or patient has any concerns.       Follow Up   Return in about 6 weeks (around 4/3/2023).  Patient was given instructions and counseling regarding her condition or for health maintenance advice. Please see specific information pulled into the AVS if appropriate.     Luci Guaman PA-C  04/05/23  14:43 EDT

## 2023-03-24 DIAGNOSIS — M25.562 LEFT KNEE PAIN, UNSPECIFIED CHRONICITY: ICD-10-CM

## 2023-03-24 DIAGNOSIS — M17.0 BILATERAL PRIMARY OSTEOARTHRITIS OF KNEE: ICD-10-CM

## 2023-03-24 DIAGNOSIS — M25.561 RIGHT KNEE PAIN, UNSPECIFIED CHRONICITY: ICD-10-CM

## 2023-03-24 DIAGNOSIS — M70.61 TROCHANTERIC BURSITIS OF RIGHT HIP: ICD-10-CM

## 2023-03-24 DIAGNOSIS — M16.0 PRIMARY OSTEOARTHRITIS OF BOTH HIPS: ICD-10-CM

## 2023-03-24 RX ORDER — DICLOFENAC SODIUM 75 MG/1
TABLET, DELAYED RELEASE ORAL
Qty: 60 TABLET | Refills: 1 | Status: SHIPPED | OUTPATIENT
Start: 2023-03-24

## 2023-04-05 ENCOUNTER — PREP FOR SURGERY (OUTPATIENT)
Dept: OTHER | Facility: HOSPITAL | Age: 48
End: 2023-04-05
Payer: COMMERCIAL

## 2023-04-05 ENCOUNTER — OFFICE VISIT (OUTPATIENT)
Dept: ORTHOPEDIC SURGERY | Facility: CLINIC | Age: 48
End: 2023-04-05
Payer: COMMERCIAL

## 2023-04-05 VITALS — WEIGHT: 189 LBS | HEIGHT: 67 IN | HEART RATE: 65 BPM | OXYGEN SATURATION: 99 % | BODY MASS INDEX: 29.66 KG/M2

## 2023-04-05 DIAGNOSIS — M16.11 PRIMARY OSTEOARTHRITIS OF RIGHT HIP: Primary | ICD-10-CM

## 2023-04-05 DIAGNOSIS — M25.551 RIGHT HIP PAIN: Primary | ICD-10-CM

## 2023-04-05 DIAGNOSIS — M70.61 TROCHANTERIC BURSITIS OF RIGHT HIP: ICD-10-CM

## 2023-04-05 DIAGNOSIS — M16.11 PRIMARY OSTEOARTHRITIS OF RIGHT HIP: ICD-10-CM

## 2023-04-05 PROCEDURE — 99214 OFFICE O/P EST MOD 30 MIN: CPT | Performed by: STUDENT IN AN ORGANIZED HEALTH CARE EDUCATION/TRAINING PROGRAM

## 2023-04-05 PROCEDURE — 1159F MED LIST DOCD IN RCRD: CPT | Performed by: STUDENT IN AN ORGANIZED HEALTH CARE EDUCATION/TRAINING PROGRAM

## 2023-04-05 PROCEDURE — 1160F RVW MEDS BY RX/DR IN RCRD: CPT | Performed by: STUDENT IN AN ORGANIZED HEALTH CARE EDUCATION/TRAINING PROGRAM

## 2023-04-05 RX ORDER — CEFAZOLIN SODIUM 2 G/100ML
2 INJECTION, SOLUTION INTRAVENOUS ONCE
OUTPATIENT
Start: 2023-04-05 | End: 2023-04-05

## 2023-04-05 RX ORDER — TRANEXAMIC ACID 10 MG/ML
1000 INJECTION, SOLUTION INTRAVENOUS ONCE
OUTPATIENT
Start: 2023-04-05 | End: 2023-04-05

## 2023-04-05 RX ORDER — DIPHENHYDRAMINE HCL 25 MG
1 TABLET,DISINTEGRATING ORAL DAILY
COMMUNITY
Start: 2023-02-13

## 2023-04-05 RX ORDER — CEFAZOLIN SODIUM IN 0.9 % NACL 3 G/100 ML
3 INTRAVENOUS SOLUTION, PIGGYBACK (ML) INTRAVENOUS ONCE
OUTPATIENT
Start: 2023-04-05 | End: 2023-04-05

## 2023-04-05 RX ORDER — AMANTADINE HYDROCHLORIDE 100 MG/1
TABLET ORAL
COMMUNITY
Start: 2023-02-16

## 2023-04-05 RX ORDER — OXYCODONE HYDROCHLORIDE AND ACETAMINOPHEN 5; 325 MG/1; MG/1
1 TABLET ORAL 3 TIMES DAILY
COMMUNITY
Start: 2023-03-27

## 2023-04-05 RX ORDER — METHOCARBAMOL 750 MG/1
1 TABLET, FILM COATED ORAL 3 TIMES DAILY
COMMUNITY
Start: 2023-03-22

## 2023-04-05 RX ORDER — CHOLECALCIFEROL (VITAMIN D3) 1250 MCG
1 CAPSULE ORAL WEEKLY
COMMUNITY
Start: 2023-03-15

## 2023-04-05 NOTE — PROGRESS NOTES
"Chief Complaint  Pain and Follow-up of the Right Hip    Subjective          Casi Lopez presents to [facility] for   History of Present Illness    The patient presents here today for follow up evaluation of the right hip. The patient has been treating her right hip osteoarthritis conservatively. She reports her hip pain is worsening and affects her quality of life. She ambulates with a limp causing low back pain. She had an injection on 2/9/23 with no relief.     No Known Allergies     Social History     Socioeconomic History   • Marital status:    Tobacco Use   • Smoking status: Never   • Smokeless tobacco: Never   Vaping Use   • Vaping Use: Never used   Substance and Sexual Activity   • Alcohol use: Never   • Drug use: Never        I reviewed the patient's chief complaint, history of present illness, review of systems, past medical history, surgical history, family history, social history, medications, and allergy list.     REVIEW OF SYSTEMS    Constitutional: Denies fevers, chills, weight loss  Cardiovascular: Denies chest pain, shortness of breath  Skin: Denies rashes, acute skin changes  Neurologic: Denies headache, loss of consciousness  MSK: Right hip pain      Objective   Vital Signs:   Pulse 65   Ht 170.2 cm (67\")   Wt 85.7 kg (189 lb)   SpO2 99%   BMI 29.60 kg/m²     Body mass index is 29.6 kg/m².    Physical Exam    General: Alert. No acute distress.   Right lower extremity: Pain with passive hip range of motion.  Hip flexion intact.  5 out of 5 hip abduction with associated pain.  Knee extensor mechanism intact.  Full knee extension.  Knee flexion 120.  Patellar crepitus with knee range of motion.  Knee stable to varus and valgus stress.  Calf soft, nontender.  Demonstrates active ankle plantarflexion and dorsiflexion.  Sensation intact over the dorsal and plantar foot.  Palpable pedal pulses.    Procedures    Imaging Results (Most Recent)     Procedure " Component Value Units Date/Time    XR Hip With or Without Pelvis 2 - 3 View Right [[id number]] Resulted: 04/05/23 1444     Updated: 04/05/23 1445    Narrative:      Indications: Follow-up right hip arthritis    Views: AP and frog lateral right hip    Findings: Advanced arthritic changes are again seen.  Complete loss of   articular height with osteophyte formation and subchondral cysts.  No   fractures.  Hip reduced.    Comparative Data: Comparative data found and reviewed                     Assessment and Plan        XR Hip With or Without Pelvis 2 - 3 View Right    Result Date: 4/5/2023  Narrative: Indications: Follow-up right hip arthritis Views: AP and frog lateral right hip Findings: Advanced arthritic changes are again seen.  Complete loss of articular height with osteophyte formation and subchondral cysts.  No fractures.  Hip reduced. Comparative Data: Comparative data found and reviewed        Diagnoses and all orders for this visit:    1. Right hip pain (Primary)  -     XR Hip With or Without Pelvis 2 - 3 View Right    2. Primary osteoarthritis of right hip    3. Trochanteric bursitis of right hip        Discussed the treatment options with the patient, operative vs non-operative. I reviewed the x-rays that were obtained today with the patient. The patient has advance osteoarthritis in her right hip. Discussed the risks and benefits of a Right Total Hip Arthroplasty. The patient expressed understanding and wished to proceed.       Will obtain X-Rays of Right hip at next visit.     Discussed surgery., Risks/benefits discussed with patient including, but not limited to: infection, bleeding, neurovascular damage, malunion, nonunion, aesthetic deformity, need for further surgery, and death., Discussed with patient the implant type being used during surgery and patient understands., Surgery pamphlet given., Call or return if worsening symptoms. and DME order for a 3 in 1 given today due to patient will be  confined to one room/level of the home that does not offer a toilet during postop recovery.     Scribed for Elias Gruber MD by Renetta Carrillo  04/05/2023   14:36 EDT         Follow Up       2 weeks postoperatively.      Patient was given instructions and counseling regarding her condition or for health maintenance advice. Please see specific information pulled into the AVS if appropriate.       I have personally performed the services described in this document as scribed by the above individual and it is both accurate and complete.     Elias Gruber MD  04/05/23  15:53 EDT

## 2023-04-06 ENCOUNTER — TELEPHONE (OUTPATIENT)
Dept: ORTHOPEDIC SURGERY | Facility: CLINIC | Age: 48
End: 2023-04-06
Payer: COMMERCIAL

## 2023-04-06 NOTE — TELEPHONE ENCOUNTER
SPOKE WITH [facility] NURSE NAVIGATOR FOR [facility] WITH DR. KAPADIA, PATIENT NEEDS TO SCHEDULE A FOLLOW UP WITH DR. KAPADIA PRIOR TO BEING CLEARED FOR TOTAL HIP REPLACEMENT.   LEFT VOICEMAIL FOR PATIENT ADVISING HER TO CONTACT DR. KAPADIA'S OFFICE TO SET UP APPT FOR CLEARANCE.

## 2023-04-11 DIAGNOSIS — M16.11 PRIMARY OSTEOARTHRITIS OF RIGHT HIP: Primary | ICD-10-CM

## 2023-04-11 DIAGNOSIS — Z47.1 AFTERCARE FOLLOWING RIGHT HIP JOINT REPLACEMENT SURGERY: Primary | ICD-10-CM

## 2023-04-11 DIAGNOSIS — Z96.641 AFTERCARE FOLLOWING RIGHT HIP JOINT REPLACEMENT SURGERY: Primary | ICD-10-CM

## 2023-04-14 ENCOUNTER — TELEPHONE (OUTPATIENT)
Dept: CARDIOLOGY | Facility: CLINIC | Age: 48
End: 2023-04-14
Payer: COMMERCIAL

## 2023-04-14 NOTE — TELEPHONE ENCOUNTER
Caller: Casi Lopez    Relationship to patient: Self    Best call back number: [phone]    Chief complaint: PATIENT IS SCHEDULED TO HAVE SURGERY ON 05.11.23 AND IS NEEDING TO SEE HER CARDIOLOGIST BEFOREHAND.    Type of visit: FOLLOW UP    Additional notes: PATIENT PREFERS APPOINTMENTS IN THE AFTERNOON DUE TO WORKING IN THE MORNINGS. SHE WAS LAST SEEN ON 08.18.21.

## 2023-04-25 ENCOUNTER — OFFICE VISIT (OUTPATIENT)
Dept: CARDIOLOGY | Facility: CLINIC | Age: 48
End: 2023-04-25
Payer: COMMERCIAL

## 2023-04-25 VITALS
WEIGHT: 189 LBS | BODY MASS INDEX: 29.66 KG/M2 | HEIGHT: 67 IN | HEART RATE: 72 BPM | SYSTOLIC BLOOD PRESSURE: 155 MMHG | DIASTOLIC BLOOD PRESSURE: 97 MMHG

## 2023-04-25 DIAGNOSIS — I10 ESSENTIAL HYPERTENSION: Primary | ICD-10-CM

## 2023-04-25 DIAGNOSIS — Q21.10 ASD (ATRIAL SEPTAL DEFECT): ICD-10-CM

## 2023-04-25 DIAGNOSIS — R07.2 PRECORDIAL PAIN: ICD-10-CM

## 2023-04-25 PROCEDURE — 1160F RVW MEDS BY RX/DR IN RCRD: CPT | Performed by: NURSE PRACTITIONER

## 2023-04-25 PROCEDURE — 99214 OFFICE O/P EST MOD 30 MIN: CPT | Performed by: NURSE PRACTITIONER

## 2023-04-25 PROCEDURE — 93000 ELECTROCARDIOGRAM COMPLETE: CPT | Performed by: NURSE PRACTITIONER

## 2023-04-25 PROCEDURE — 1159F MED LIST DOCD IN RCRD: CPT | Performed by: NURSE PRACTITIONER

## 2023-04-25 RX ORDER — CARVEDILOL 6.25 MG/1
6.25 TABLET ORAL 2 TIMES DAILY
Qty: 180 TABLET | Refills: 3 | Status: SHIPPED | OUTPATIENT
Start: 2023-04-25

## 2023-04-25 NOTE — PROGRESS NOTES
"Chief Complaint  Hypertension    Subjective            Casi Lopez presents to [facility] CARDIOLOGY  History of Present Illness    Ms. Lopez is here today for surgical clearance.  She has essential hypertension, mixed hyperlipidemia.  She is having a total right hip replacement on May 11 and is needing clearance for that today.  She had an ischemia work-up in 2021, ultimately she had a heart catheterization which revealed normal coronary arteries and normal LVEF.  Today she denies chest pain, shortness of breath, palpitations, dizziness, or syncope.    PMH  Past Medical History:   Diagnosis Date   • Asthma    • Depression    • Diabetes mellitus    • Hyperlipidemia    • Hypertension    • Injury of back          SURGICALHX  Past Surgical History:   Procedure Laterality Date   • CARDIAC CATHETERIZATION N/A 8/12/2021    Procedure: Left Heart Cath - R radial;  Surgeon: NNAMDI Hill MD;  Location: [facility] CATH INVASIVE LOCATION;  Service: Cardiology;  Laterality: N/A;   • CARDIAC SURGERY      \"repair of hole in heart\"   • HYSTERECTOMY          SOC  Social History     Socioeconomic History   • Marital status:    Tobacco Use   • Smoking status: Never   • Smokeless tobacco: Never   Vaping Use   • Vaping Use: Never used   Substance and Sexual Activity   • Alcohol use: Never   • Drug use: Never         FAMHX  Family History   Problem Relation Age of Onset   • Diabetes Mother    • Hypertension Mother    • Hypertension Father    • Diabetes Father           ALLERGY  No Known Allergies     MEDSCURRENT    Current Outpatient Medications:   •  albuterol sulfate  (90 Base) MCG/ACT inhaler, albuterol sulfate HFA 90 mcg/actuation aerosol inhaler, Disp: , Rfl:   •  amantadine (SYMMETREL) 100 MG tablet, , Disp: , Rfl:   •  amitriptyline (ELAVIL) 50 MG tablet, amitriptyline 50 mg tablet, Disp: , Rfl:   •  ARIPiprazole (ABILIFY) 10 MG tablet, aripiprazole 10 mg tablet  TAKE 1 TABLET BY MOUTH DAILY, " Disp: , Rfl:   •  atorvastatin (LIPITOR) 20 MG tablet, Take 1 tablet by mouth every night at bedtime., Disp: , Rfl:   •  Calcium 600+D3 600-20 MG-MCG tablet, Take 1 tablet by mouth Daily., Disp: , Rfl:   •  Cholecalciferol (Vitamin D3) 1.25 MG (79801 UT) capsule, Take 1 capsule by mouth 1 (One) Time Per Week., Disp: , Rfl:   •  diclofenac (VOLTAREN) 75 MG EC tablet, TAKE 1 TABLET BY MOUTH TWICE DAILY, Disp: 60 tablet, Rfl: 1  •  doxycycline (ADOXA) 100 MG tablet, Take 1 tablet by mouth 2 (Two) Times a Day., Disp: 20 tablet, Rfl: 0  •  escitalopram (LEXAPRO) 10 MG tablet, escitalopram 10 mg tablet, Disp: , Rfl:   •  famotidine (PEPCID) 20 MG tablet, famotidine 20 mg tablet  TAKE 1 TABLET BY MOUTH TWICE DAILY, Disp: , Rfl:   •  gabapentin (NEURONTIN) 600 MG tablet, Every 8 (Eight) Hours., Disp: , Rfl:   •  Jardiance 10 MG tablet tablet, Take 1 tablet by mouth Daily., Disp: , Rfl:   •  metFORMIN (GLUCOPHAGE) 500 MG tablet, metformin 500 mg tablet  TAKE 1 TABLET BY MOUTH TWICE DAILY, Disp: , Rfl:   •  methocarbamol (ROBAXIN) 750 MG tablet, Take 1 tablet by mouth 3 (Three) Times a Day., Disp: , Rfl:   •  montelukast (SINGULAIR) 10 MG tablet, montelukast 10 mg tablet  TAKE 1 TABLET BY MOUTH DAILY, Disp: , Rfl:   •  oxyCODONE-acetaminophen (PERCOCET) 5-325 MG per tablet, Take 1 tablet by mouth 3 (Three) Times a Day., Disp: , Rfl:   •  potassium chloride 10 MEQ CR tablet, Take 2 tablets by mouth 2 (Two) Times a Day., Disp: 180 tablet, Rfl: 3  •  prazosin (MINIPRESS) 1 MG capsule, prazosin 1 mg capsule, Disp: , Rfl:   •  prazosin (MINIPRESS) 2 MG capsule, Take 1 capsule by mouth Every Night., Disp: , Rfl:   •  QUEtiapine (SEROquel) 50 MG tablet, quetiapine 50 mg tablet  TAKE 1 TABLET BY MOUTH EVERY NIGHT, Disp: , Rfl:   •  Qvar RediHaler 40 MCG/ACT inhaler, Inhale 1 puff 2 (Two) Times a Day., Disp: , Rfl:   •  Symbicort 80-4.5 MCG/ACT inhaler, Inhale 2 puffs 2 (Two) Times a Day., Disp: , Rfl:   •  Vitamin E 180 MG (400 UNIT)  "capsule capsule, Take 1 capsule by mouth Daily., Disp: , Rfl:   •  carvedilol (COREG) 6.25 MG tablet, Take 1 tablet by mouth 2 (Two) Times a Day., Disp: 180 tablet, Rfl: 3      Review of Systems   Constitutional: Negative for malaise/fatigue.   Cardiovascular: Negative for chest pain, dyspnea on exertion, irregular heartbeat, near-syncope, palpitations and syncope.   Respiratory: Negative for shortness of breath.    Neurological: Negative for dizziness and light-headedness.        Objective     /97   Pulse 72   Ht 170.2 cm (67\")   Wt 85.7 kg (189 lb)   BMI 29.60 kg/m²       General Appearance:   · well developed  · well nourished  HENT:   · oropharynx moist  · lips not cyanotic  Neck:  · thyroid not enlarged  · supple  Respiratory:  · no respiratory distress  · normal breath sounds  · no rales  Cardiovascular:  · no jugular venous distention  · regular rhythm  · apical impulse normal  · S1 normal, S2 normal  · no S3, no S4   · no murmur  · no rub, no thrill  · carotid pulses normal; no bruit  · pedal pulses normal  · lower extremity edema: none    Musculoskeletal:  · no clubbing of fingers.   · normocephalic, head atraumatic  Skin:   · warm, dry  Psychiatric:  · judgement and insight appropriate  · normal mood and affect      Result Review :         CMP        12/16/2022    11:47   CMP   Glucose 176     BUN 8     Creatinine 0.83     EGFR 87.1     Sodium 142     Potassium 2.7     Chloride 103     Calcium 8.8     Total Protein 6.7     Albumin 4.00     Globulin 2.7     Total Bilirubin 0.3     Alkaline Phosphatase 85     AST (SGOT) 14     ALT (SGPT) 16     Albumin/Globulin Ratio 1.5     BUN/Creatinine Ratio 9.6     Anion Gap 13.7       CBC        12/16/2022    11:47   CBC   WBC 5.61     RBC 3.83     Hemoglobin 11.6     Hematocrit 35.7     MCV 93.2     MCH 30.3     MCHC 32.5     RDW 15.0     Platelets 272                 Data reviewed: Cardiology studies Cardiac catheterization notes reviewed.       ECG 12 " Lead    Date/Time: 4/25/2023 3:13 PM  Performed by: Marce Graham APRN  Authorized by: NNAMDI Hill MD   Rhythm: sinus rhythm  Rate: normal  BPM: 70  Conduction: conduction normal  ST Segments: ST segments normal  T Waves: T waves normal  QRS axis: normal  Other findings: non-specific ST-T wave changes, left ventricular hypertrophy and left atrial abnormality    Clinical impression: non-specific ECG              Casi Lopez  reports that she has never smoked. She has never used smokeless tobacco.. I have educated her on the risk of diseases from using tobacco products such as cancer, COPD and heart disease.                Assessment and Plan        ASSESSMENT:  Encounter Diagnoses   Name Primary?   • Essential hypertension Yes   • Precordial pain    • ASD (atrial septal defect)          PLAN:    1.  Essential hypertension-elevated today in the office.  Changing her atenolol to carvedilol today.  Blood pressure check in 2 weeks.  2.  Precordial pain-Ms. Lopez had an ischemia work-up in 2021, coronary angiography revealed normal coronary arteries.  She has had no further episodes of chest pain or anginal equivalents  3.  Atrial septal defect-postsurgical repair in 1980.    Ms. Lopez is low cardiac risk for not high risk orthopedic surgery.  She does not need to do any additional testing prior to surgery.  We will follow-up on blood pressure in a couple weeks prior to surgery to get better control on that.  Otherwise she may proceed with surgery from my standpoint.      Patient was given instructions and counseling regarding her condition or for health maintenance advice. Please see specific information pulled into the AVS if appropriate.           JENA Willis   4/25/2023  15:15 EDT

## 2023-04-26 ENCOUNTER — TELEPHONE (OUTPATIENT)
Dept: CARDIOLOGY | Facility: CLINIC | Age: 48
End: 2023-04-26
Payer: COMMERCIAL

## 2023-04-26 ENCOUNTER — TELEPHONE (OUTPATIENT)
Dept: SLEEP MEDICINE | Facility: HOSPITAL | Age: 48
End: 2023-04-26
Payer: COMMERCIAL

## 2023-04-26 ENCOUNTER — OFFICE VISIT (OUTPATIENT)
Dept: SLEEP MEDICINE | Facility: HOSPITAL | Age: 48
End: 2023-04-26
Payer: COMMERCIAL

## 2023-04-26 VITALS
BODY MASS INDEX: 27.97 KG/M2 | SYSTOLIC BLOOD PRESSURE: 148 MMHG | WEIGHT: 178.2 LBS | OXYGEN SATURATION: 95 % | HEART RATE: 75 BPM | HEIGHT: 67 IN | DIASTOLIC BLOOD PRESSURE: 83 MMHG

## 2023-04-26 DIAGNOSIS — R06.83 SNORING: ICD-10-CM

## 2023-04-26 DIAGNOSIS — G47.33 OSA (OBSTRUCTIVE SLEEP APNEA): Primary | ICD-10-CM

## 2023-04-26 PROBLEM — F51.04 PSYCHOPHYSIOLOGICAL INSOMNIA: Status: RESOLVED | Noted: 2021-06-30 | Resolved: 2023-04-26

## 2023-04-26 PROBLEM — G47.10 HYPERSOMNIA: Status: RESOLVED | Noted: 2021-06-30 | Resolved: 2023-04-26

## 2023-04-26 PROCEDURE — G0463 HOSPITAL OUTPT CLINIC VISIT: HCPCS

## 2023-04-26 RX ORDER — CHOLECALCIFEROL (VITAMIN D3) 125 MCG
5 CAPSULE ORAL
COMMUNITY

## 2023-04-26 NOTE — PROGRESS NOTES
"  [address] [address]  Phone: [phone]  Fax: [phone]      SLEEP CLINIC FOLLOW UP PROGRESS NOTE.    Casi Lopez  [id number]   1975  48 y.o.  female      PCP: Adore Quispe APRN      Date of visit: 4/26/2023    Chief Complaint   Patient presents with   • Sleep Apnea   • Snoring   • Fatigue       HPI:  This is a 48 y.o. years old patient is here for the management of obstructive sleep apnea.  Sleep apnea is moderate in severity with a AHI of 21.6/hr. she had a split-night study on August 2, 2021.  She was prescribed a CPAP but she did not get the CPAP because she had to go to a different state to take care of her mother who was sick.  Now she is back and wants to get treated for her sleep apnea.  Her symptoms are still present.    Medications and allergies are reviewed by me and documented in the encounter.     SOCIAL (habits pertaining to sleep medicine)  • History tobacco use:No   • History of alcohol use: 0 per week  • Caffeine use: 2     REVIEW OF SYSTEMS:   Pertaining positive symptoms are:  • Badger Sleepiness Scale :Total score: 3   • Snoring      PHYSICAL EXAMINATION:  CONSTITUTIONAL:  Vitals:    04/26/23 0900   BP: 148/83   Pulse: 75   SpO2: 95%   Weight: 80.8 kg (178 lb 3.2 oz)   Height: 170.2 cm (67.01\")    Body mass index is 27.9 kg/m².   NOSE: nasal passages are clear, No deformities noted   RESP SYSTEM: Not in any respiratory distress, no chest deformities noted,   CARDIOVASULAR: No edema noted  NEURO: Oriented x 3, gait normal,  Mood and affect appeared appropriate        ASSESSMENT AND PLAN:  · Obstructive sleep apnea ( G 47.33).  I have prescribed CPAP at 8 cm with a flex of 2.  A order has been sent to [facility] to get her a new CPAP.  The CPAP is medically necessary to treat her sleep apnea.  Once he gets them CPAP she will see me in about 31 to 90 days for follow-up on compliance  · Snoring due to sleep " apnea  · Fatigue  · Return for 31 to 90 days after PAP setup with down load. . Patient's questions were answered.    4/26/2023  Aurelio Biswas MD  Sleep Medicine.  Medical Director,   [facility], [facility] sleep [facility].

## 2023-05-02 ENCOUNTER — PRE-ADMISSION TESTING (OUTPATIENT)
Dept: PREADMISSION TESTING | Facility: HOSPITAL | Age: 48
End: 2023-05-02
Payer: COMMERCIAL

## 2023-05-02 VITALS
HEART RATE: 79 BPM | BODY MASS INDEX: 29.31 KG/M2 | OXYGEN SATURATION: 96 % | WEIGHT: 186.73 LBS | TEMPERATURE: 96.6 F | RESPIRATION RATE: 16 BRPM | DIASTOLIC BLOOD PRESSURE: 66 MMHG | SYSTOLIC BLOOD PRESSURE: 122 MMHG | HEIGHT: 67 IN

## 2023-05-02 DIAGNOSIS — M16.11 PRIMARY OSTEOARTHRITIS OF RIGHT HIP: ICD-10-CM

## 2023-05-02 DIAGNOSIS — Z96.641 AFTERCARE FOLLOWING RIGHT HIP JOINT REPLACEMENT SURGERY: Primary | ICD-10-CM

## 2023-05-02 DIAGNOSIS — Z47.1 AFTERCARE FOLLOWING RIGHT HIP JOINT REPLACEMENT SURGERY: Primary | ICD-10-CM

## 2023-05-02 LAB
ALBUMIN SERPL-MCNC: 3.8 G/DL (ref 3.5–5.2)
ALBUMIN/GLOB SERPL: 1.2 G/DL
ALP SERPL-CCNC: 92 U/L (ref 39–117)
ALT SERPL W P-5'-P-CCNC: 15 U/L (ref 1–33)
ANION GAP SERPL CALCULATED.3IONS-SCNC: 10 MMOL/L (ref 5–15)
AST SERPL-CCNC: 15 U/L (ref 1–32)
BASOPHILS # BLD AUTO: 0.02 10*3/MM3 (ref 0–0.2)
BASOPHILS NFR BLD AUTO: 0.5 % (ref 0–1.5)
BILIRUB SERPL-MCNC: 0.3 MG/DL (ref 0–1.2)
BUN SERPL-MCNC: 13 MG/DL (ref 6–20)
BUN/CREAT SERPL: 18.3 (ref 7–25)
CALCIUM SPEC-SCNC: 8.9 MG/DL (ref 8.6–10.5)
CHLORIDE SERPL-SCNC: 104 MMOL/L (ref 98–107)
CO2 SERPL-SCNC: 27 MMOL/L (ref 22–29)
CREAT SERPL-MCNC: 0.71 MG/DL (ref 0.57–1)
DEPRECATED RDW RBC AUTO: 50.4 FL (ref 37–54)
EGFRCR SERPLBLD CKD-EPI 2021: 104.4 ML/MIN/1.73
EOSINOPHIL # BLD AUTO: 0.05 10*3/MM3 (ref 0–0.4)
EOSINOPHIL NFR BLD AUTO: 1.2 % (ref 0.3–6.2)
ERYTHROCYTE [DISTWIDTH] IN BLOOD BY AUTOMATED COUNT: 14.9 % (ref 12.3–15.4)
GLOBULIN UR ELPH-MCNC: 3.1 GM/DL
GLUCOSE SERPL-MCNC: 127 MG/DL (ref 65–99)
HBA1C MFR BLD: 6.6 % (ref 4.8–5.6)
HCT VFR BLD AUTO: 37.5 % (ref 34–46.6)
HGB BLD-MCNC: 12.2 G/DL (ref 12–15.9)
IMM GRANULOCYTES # BLD AUTO: 0.01 10*3/MM3 (ref 0–0.05)
IMM GRANULOCYTES NFR BLD AUTO: 0.2 % (ref 0–0.5)
INR PPP: 0.99 (ref 0.86–1.15)
LYMPHOCYTES # BLD AUTO: 1.75 10*3/MM3 (ref 0.7–3.1)
LYMPHOCYTES NFR BLD AUTO: 42.1 % (ref 19.6–45.3)
MCH RBC QN AUTO: 29.9 PG (ref 26.6–33)
MCHC RBC AUTO-ENTMCNC: 32.5 G/DL (ref 31.5–35.7)
MCV RBC AUTO: 91.9 FL (ref 79–97)
MONOCYTES # BLD AUTO: 0.34 10*3/MM3 (ref 0.1–0.9)
MONOCYTES NFR BLD AUTO: 8.2 % (ref 5–12)
NEUTROPHILS NFR BLD AUTO: 1.99 10*3/MM3 (ref 1.7–7)
NEUTROPHILS NFR BLD AUTO: 47.8 % (ref 42.7–76)
NRBC BLD AUTO-RTO: 0 /100 WBC (ref 0–0.2)
PLATELET # BLD AUTO: 278 10*3/MM3 (ref 140–450)
PMV BLD AUTO: 10 FL (ref 6–12)
POTASSIUM SERPL-SCNC: 3.5 MMOL/L (ref 3.5–5.2)
PROT SERPL-MCNC: 6.9 G/DL (ref 6–8.5)
PROTHROMBIN TIME: 13.2 SECONDS (ref 11.8–14.9)
RBC # BLD AUTO: 4.08 10*6/MM3 (ref 3.77–5.28)
SODIUM SERPL-SCNC: 141 MMOL/L (ref 136–145)
WBC NRBC COR # BLD: 4.16 10*3/MM3 (ref 3.4–10.8)

## 2023-05-02 PROCEDURE — 83036 HEMOGLOBIN GLYCOSYLATED A1C: CPT

## 2023-05-02 PROCEDURE — 85610 PROTHROMBIN TIME: CPT

## 2023-05-02 PROCEDURE — 80053 COMPREHEN METABOLIC PANEL: CPT

## 2023-05-02 PROCEDURE — 36415 COLL VENOUS BLD VENIPUNCTURE: CPT

## 2023-05-02 PROCEDURE — 85025 COMPLETE CBC W/AUTO DIFF WBC: CPT

## 2023-05-02 NOTE — DISCHARGE INSTRUCTIONS
IMPORTANT INSTRUCTIONS - PRE-ADMISSION TESTING  DO NOT EAT OR CHEW anything after midnight the night before your procedure.    You may have CLEAR liquids up to _3_____ hours prior to ARRIVAL time.   Take the following medications the morning of your procedure with JUST A SIP OF WATER:  _____  ALBUTEROL INHALER IF NEEDED AND BRING WITH YOU, AMANTADINE, ARIPIPRAZOLE, CARVEDILOL, ESCITALOPRAM, FAMOTIDINE, GABAPENTIN, METHOCARBAMOL, MONTELUKAST, OXYCODONE, POTASSIUM, QVAR INHALER AND BRING WITH YOU, SYMBICORT INHALER AND BRING WITH YOU__________________________________________________________________________________________________________________________________________________________________________________    DO NOT BRING your medications to the hospital with you, UNLESS something has changed since your PRE-Admission Testing appointment.  AFTER 5/3/23 Hold all vitamins, supplements, and NSAIDS (Non- steroidal anti-inflammatory meds) INCLUDING DICOLFENAC for one week prior to surgery (you MAY take Tylenol or Acetaminophen).  If you are diabetic, check your blood sugar the morning of your procedure. If it is less than 70 or if you are feeling symptomatic, call the following number for further instructions: [phone]__.  Use your inhalers/nebulizers as usual, the morning of your procedure. BRING YOUR INHALERS with you.   Bring your CPAP or BIPAP to hospital, ONLY IF YOU WILL BE SPENDING THE NIGHT.   Make sure you have a ride home and have someone who will stay with you the day of your procedure after you go home.  If you have any questions, please call your Pre-Admission Testing Nurse, __JESSICA______________ at [phone]__.   Per anesthesia request, do not smoke for 24 hours before your procedure or as instructed by your surgeon.     WILL CALL ON 5/10/23 AND GIVE OFFICIAL ARRIVAL TIME FOR DAY OF SURGERY  DRINK GATORADE OR POWERADE SUGAR FREE  NO RED 3 HOURS PRIOR TO ARRIVAL DAY OF PROCEDURE. REFER  TO CLEAR LIQUID DIET SHEET FOR OTHER APPROVED CLEAR LIQUIDS  REFER TO PAGE 9 IN TOTAL JOINT BOOK FOR BATHING INSTRUCTIONS. NO NAIL POLISH UPPER OR LOWER EXT OR JEWELRY OF ANY TYPE DAY OF SURGERY  COME TO SAME AREA HAD PAT APPT ENTRANCE A ELEVATOR A 3RD FLOOR DAY OF SURGERY  CASH, CHECK, OR CARD FOR MEDS TO BED IF INDICATED AT DISCHARGE  ON 5/10/23 NO METFORMIN AFTER 6 PM

## 2023-05-03 ENCOUNTER — ANESTHESIA EVENT (OUTPATIENT)
Dept: PERIOP | Facility: HOSPITAL | Age: 48
End: 2023-05-03
Payer: COMMERCIAL

## 2023-05-04 DIAGNOSIS — Z96.651 AFTERCARE FOLLOWING RIGHT KNEE JOINT REPLACEMENT SURGERY: Primary | ICD-10-CM

## 2023-05-04 DIAGNOSIS — Z47.1 AFTERCARE FOLLOWING RIGHT KNEE JOINT REPLACEMENT SURGERY: Primary | ICD-10-CM

## 2023-05-09 ENCOUNTER — OFFICE VISIT (OUTPATIENT)
Dept: CARDIOLOGY | Facility: CLINIC | Age: 48
End: 2023-05-09
Payer: COMMERCIAL

## 2023-05-09 VITALS
WEIGHT: 182.4 LBS | BODY MASS INDEX: 28.63 KG/M2 | HEIGHT: 67 IN | HEART RATE: 78 BPM | SYSTOLIC BLOOD PRESSURE: 126 MMHG | DIASTOLIC BLOOD PRESSURE: 83 MMHG

## 2023-05-09 DIAGNOSIS — I10 ESSENTIAL HYPERTENSION: Primary | ICD-10-CM

## 2023-05-09 DIAGNOSIS — R07.2 PRECORDIAL PAIN: ICD-10-CM

## 2023-05-09 DIAGNOSIS — Q21.10 ASD (ATRIAL SEPTAL DEFECT): ICD-10-CM

## 2023-05-09 NOTE — PROGRESS NOTES
"Chief Complaint  Follow-up and Hypertension    Subjective            Casi Lopez presents to [facility] CARDIOLOGY  History of Present Illness    Ms Lopez is here today for follow-up evaluation management of essential hypertension and mixed hyperlipidemia.  She is having a total right hip replacement on May 11-I saw her last week and send clearance for that.  However during that office visit she was hypertensive.  We changed her atenolol to carvedilol.  Today blood pressure is controlled.  She denies chest pain, excessive shortness of breath, palpitations, or dizziness    PMH  Past Medical History:   Diagnosis Date   • Acid reflux    • Anxiety    • Arthritis    • Asthma     HAS INHALERS SCHEDULED AND PRN   • Depression    • Diabetes mellitus     [facility]   • H/O precordial chest pain     FOLLOWED BY DR HILL. DENIES CP/SOA. REPORTS WORKS FULL TIME IN [employer].   • Hyperlipidemia    • Hypertension    • Injury of back    • Migraines    • Pain management     [facility]   • Sleep apnea     REPORTS IS SUPPOSED TO BE RECEIVING CPAP         SURGICALHX  Past Surgical History:   Procedure Laterality Date   • CARDIAC CATHETERIZATION N/A 08/12/2021    Procedure: Left Heart Cath - R radial;  Surgeon: NNAMDI Hill MD;  Location: [facility] INVASIVE LOCATION;  Service: Cardiology;  Laterality: N/A;   • CARDIAC SURGERY      \"repair of hole in heart\" HAD REPAIR OF ATRIAL-SEPTAL DEFECT IN 1980   • HYSTERECTOMY     • LAPAROSCOPIC TUBAL LIGATION          SOC  Social History     Socioeconomic History   • Marital status:    Tobacco Use   • Smoking status: Never   • Smokeless tobacco: Never   Vaping Use   • Vaping Use: Never used   Substance and Sexual Activity   • Alcohol use: Never   • Drug use: Never   • Sexual activity: Defer         FAMHX  Family History   Problem Relation Age of Onset   • Diabetes Mother    • Hypertension Mother    • Hypertension Father    • Diabetes Father     "       ALLERGY  No Known Allergies     MEDSCURRENT    Current Outpatient Medications:   •  albuterol sulfate  (90 Base) MCG/ACT inhaler, Inhale 2 puffs Every 4 (Four) Hours As Needed., Disp: , Rfl:   •  amantadine (SYMMETREL) 100 MG tablet, Take 1 tablet by mouth Daily., Disp: , Rfl:   •  amitriptyline (ELAVIL) 50 MG tablet, Take 1 tablet by mouth Every Night., Disp: , Rfl:   •  ARIPiprazole (ABILIFY) 10 MG tablet, aripiprazole 10 mg tablet  TAKE 1 TABLET BY MOUTH DAILY, Disp: , Rfl:   •  atorvastatin (LIPITOR) 20 MG tablet, Take 1 tablet by mouth every night at bedtime., Disp: , Rfl:   •  Calcium 600+D3 600-20 MG-MCG tablet, Take 1 tablet by mouth Daily., Disp: , Rfl:   •  carvedilol (COREG) 6.25 MG tablet, Take 1 tablet by mouth 2 (Two) Times a Day., Disp: 180 tablet, Rfl: 3  •  Cholecalciferol (Vitamin D3) 1.25 MG (67347 UT) capsule, Take 1 capsule by mouth 1 (One) Time Per Week. TAKES ON FRIDAY, Disp: , Rfl:   •  diclofenac (VOLTAREN) 75 MG EC tablet, TAKE 1 TABLET BY MOUTH TWICE DAILY, Disp: 60 tablet, Rfl: 1  •  escitalopram (LEXAPRO) 10 MG tablet, Take 1 tablet by mouth Daily., Disp: , Rfl:   •  famotidine (PEPCID) 20 MG tablet, famotidine 20 mg tablet  TAKE 1 TABLET BY MOUTH TWICE DAILY, Disp: , Rfl:   •  gabapentin (NEURONTIN) 600 MG tablet, Every 8 (Eight) Hours., Disp: , Rfl:   •  Jardiance 10 MG tablet tablet, 1 tablet Daily. INSTRUCTED PER ANESTHESIA PROTOCOL, Disp: , Rfl:   •  melatonin 5 MG tablet tablet, Take 1 tablet by mouth., Disp: , Rfl:   •  metFORMIN (GLUCOPHAGE) 500 MG tablet, INSTRUCTED PER ANESTHESIA PROTOCOL, Disp: , Rfl:   •  methocarbamol (ROBAXIN) 750 MG tablet, Take 1 tablet by mouth 3 (Three) Times a Day., Disp: , Rfl:   •  montelukast (SINGULAIR) 10 MG tablet, montelukast 10 mg tablet  TAKE 1 TABLET BY MOUTH DAILY, Disp: , Rfl:   •  oxyCODONE-acetaminophen (PERCOCET) 5-325 MG per tablet, Take 1 tablet by mouth 3 (Three) Times a Day., Disp: , Rfl:   •  potassium chloride 10 MEQ  "CR tablet, Take 2 tablets by mouth 2 (Two) Times a Day., Disp: 180 tablet, Rfl: 3  •  prazosin (MINIPRESS) 1 MG capsule, Take 1 capsule by mouth Every Night., Disp: , Rfl:   •  prazosin (MINIPRESS) 2 MG capsule, Take 1 capsule by mouth Every Night., Disp: , Rfl:   •  QUEtiapine (SEROquel) 50 MG tablet, quetiapine 50 mg tablet  TAKE 1 TABLET BY MOUTH EVERY NIGHT, Disp: , Rfl:   •  Qvar RediHaler 40 MCG/ACT inhaler, Inhale 1 puff 2 (Two) Times a Day., Disp: , Rfl:   •  Symbicort 80-4.5 MCG/ACT inhaler, Inhale 2 puffs 2 (Two) Times a Day., Disp: , Rfl:   •  Vitamin E 180 MG (400 UNIT) capsule capsule, Take 1 capsule by mouth Daily., Disp: , Rfl:       Review of Systems   Constitutional: Negative for malaise/fatigue.   Cardiovascular: Negative for chest pain, dyspnea on exertion, irregular heartbeat, near-syncope, orthopnea and syncope.   Respiratory: Negative for shortness of breath.    Musculoskeletal: Positive for joint pain.   Neurological: Negative for dizziness.        Objective     /83   Pulse 78   Ht 170.2 cm (67.01\")   Wt 82.7 kg (182 lb 6.4 oz)   BMI 28.56 kg/m²       General Appearance:   · well developed  · well nourished  HENT:   · oropharynx moist  · lips not cyanotic  Neck:  · thyroid not enlarged  · supple  Respiratory:  · no respiratory distress  · normal breath sounds  · no rales  Cardiovascular:  · no jugular venous distention  · regular rhythm  · apical impulse normal  · S1 normal, S2 normal  · no S3, no S4   · no murmur  · no rub, no thrill  · carotid pulses normal; no bruit  · pedal pulses normal  · lower extremity edema: none    Musculoskeletal:  · no clubbing of fingers.   · normocephalic, head atraumatic  Skin:   · warm, dry  Psychiatric:  · judgement and insight appropriate  · normal mood and affect      Result Review :         [facility]        12/16/2022    11:47 5/2/2023    11:06   CMP   Glucose 176   127     BUN 8   13     Creatinine 0.83   0.71     EGFR 87.1   104.4     Sodium 142   141 "     Potassium 2.7   3.5     Chloride 103   104     Calcium 8.8   8.9     Total Protein 6.7   6.9     Albumin 4.00   3.8     Globulin 2.7   3.1     Total Bilirubin 0.3   0.3     Alkaline Phosphatase 85   92     AST (SGOT) 14   15     ALT (SGPT) 16   15     Albumin/Globulin Ratio 1.5   1.2     BUN/Creatinine Ratio 9.6   18.3     Anion Gap 13.7   10.0       CBC        12/16/2022    11:47 5/2/2023    11:06   CBC   WBC 5.61   4.16     RBC 3.83   4.08     Hemoglobin 11.6   12.2     Hematocrit 35.7   37.5     MCV 93.2   91.9     MCH 30.3   29.9     MCHC 32.5   32.5     RDW 15.0   14.9     Platelets 272   278                 Data reviewed: Cardiology studies EKG last office visit showed normal sinus rhythm, rate 70.  Nonspecific ST-T wave changes     Procedures      Casi Lopez  reports that she has never smoked. She has never used smokeless tobacco.. I have educated her on the risk of diseases from using tobacco products such as cancer, COPD and heart disease.                   Assessment and Plan        ASSESSMENT:  Encounter Diagnoses   Name Primary?   • Essential hypertension Yes   • Precordial pain    • ASD (atrial septal defect)          PLAN:    1.  Essential hypertension-controlled, continue current medical therapy.  2.  Precordial-resolved.  She had an ischemia work-up in 2021, ultimately coronary angiography revealed normal coronary arteries.  No further chest pain.  3.  Atrial septal defect-postsurgical repair in 1980.    Ms. Lopez is agreeable to the plan of care, she will follow-up annually unless problems arise.      Patient was given instructions and counseling regarding her condition or for health maintenance advice. Please see specific information pulled into the AVS if appropriate.           Marce Graham, APRN   5/9/2023  14:01 EDT

## 2023-05-11 ENCOUNTER — HOSPITAL ENCOUNTER (OUTPATIENT)
Facility: HOSPITAL | Age: 48
Discharge: HOME HEALTH | End: 2023-05-12
Attending: STUDENT IN AN ORGANIZED HEALTH CARE EDUCATION/TRAINING PROGRAM | Admitting: STUDENT IN AN ORGANIZED HEALTH CARE EDUCATION/TRAINING PROGRAM
Payer: COMMERCIAL

## 2023-05-11 ENCOUNTER — APPOINTMENT (OUTPATIENT)
Dept: GENERAL RADIOLOGY | Facility: HOSPITAL | Age: 48
End: 2023-05-11
Payer: COMMERCIAL

## 2023-05-11 ENCOUNTER — ANESTHESIA (OUTPATIENT)
Dept: PERIOP | Facility: HOSPITAL | Age: 48
End: 2023-05-11
Payer: COMMERCIAL

## 2023-05-11 DIAGNOSIS — M16.11 PRIMARY OSTEOARTHRITIS OF RIGHT HIP: ICD-10-CM

## 2023-05-11 DIAGNOSIS — R26.2 DIFFICULTY WALKING: Primary | ICD-10-CM

## 2023-05-11 DIAGNOSIS — Z96.641 S/P TOTAL RIGHT HIP ARTHROPLASTY: ICD-10-CM

## 2023-05-11 DIAGNOSIS — Z78.9 DECREASED ACTIVITIES OF DAILY LIVING (ADL): ICD-10-CM

## 2023-05-11 LAB
GLUCOSE BLDC GLUCOMTR-MCNC: 123 MG/DL (ref 70–99)
GLUCOSE BLDC GLUCOMTR-MCNC: 127 MG/DL (ref 70–99)
GLUCOSE BLDC GLUCOMTR-MCNC: 130 MG/DL (ref 70–99)
GLUCOSE BLDC GLUCOMTR-MCNC: 132 MG/DL (ref 70–99)
GLUCOSE BLDC GLUCOMTR-MCNC: 86 MG/DL (ref 70–99)
HCT VFR BLD AUTO: 36.4 % (ref 34–46.6)
HGB BLD-MCNC: 11.4 G/DL (ref 12–15.9)

## 2023-05-11 PROCEDURE — 25010000002 ONDANSETRON PER 1 MG: Performed by: NURSE ANESTHETIST, CERTIFIED REGISTERED

## 2023-05-11 PROCEDURE — 97161 PT EVAL LOW COMPLEX 20 MIN: CPT

## 2023-05-11 PROCEDURE — 73502 X-RAY EXAM HIP UNI 2-3 VIEWS: CPT

## 2023-05-11 PROCEDURE — 94799 UNLISTED PULMONARY SVC/PX: CPT

## 2023-05-11 PROCEDURE — 94761 N-INVAS EAR/PLS OXIMETRY MLT: CPT

## 2023-05-11 PROCEDURE — 82948 REAGENT STRIP/BLOOD GLUCOSE: CPT

## 2023-05-11 PROCEDURE — 25010000002 CEFAZOLIN IN DEXTROSE 2-4 GM/100ML-% SOLUTION: Performed by: STUDENT IN AN ORGANIZED HEALTH CARE EDUCATION/TRAINING PROGRAM

## 2023-05-11 PROCEDURE — 85018 HEMOGLOBIN: CPT | Performed by: STUDENT IN AN ORGANIZED HEALTH CARE EDUCATION/TRAINING PROGRAM

## 2023-05-11 PROCEDURE — 25010000002 EPINEPHRINE 1 MG/ML SOLUTION: Performed by: STUDENT IN AN ORGANIZED HEALTH CARE EDUCATION/TRAINING PROGRAM

## 2023-05-11 PROCEDURE — C1776 JOINT DEVICE (IMPLANTABLE): HCPCS | Performed by: STUDENT IN AN ORGANIZED HEALTH CARE EDUCATION/TRAINING PROGRAM

## 2023-05-11 PROCEDURE — 25010000002 ROPIVACAINE PER 1 MG: Performed by: STUDENT IN AN ORGANIZED HEALTH CARE EDUCATION/TRAINING PROGRAM

## 2023-05-11 PROCEDURE — 76000 FLUOROSCOPY <1 HR PHYS/QHP: CPT

## 2023-05-11 PROCEDURE — 25010000002 MIDAZOLAM PER 1MG: Performed by: ANESTHESIOLOGY

## 2023-05-11 PROCEDURE — 25010000002 SUGAMMADEX 200 MG/2ML SOLUTION: Performed by: NURSE ANESTHETIST, CERTIFIED REGISTERED

## 2023-05-11 PROCEDURE — 25010000002 MORPHINE PER 10 MG: Performed by: STUDENT IN AN ORGANIZED HEALTH CARE EDUCATION/TRAINING PROGRAM

## 2023-05-11 PROCEDURE — 25010000002 DEXAMETHASONE PER 1 MG: Performed by: NURSE ANESTHETIST, CERTIFIED REGISTERED

## 2023-05-11 PROCEDURE — 94640 AIRWAY INHALATION TREATMENT: CPT

## 2023-05-11 PROCEDURE — 27130 TOTAL HIP ARTHROPLASTY: CPT | Performed by: STUDENT IN AN ORGANIZED HEALTH CARE EDUCATION/TRAINING PROGRAM

## 2023-05-11 PROCEDURE — 25010000002 KETOROLAC TROMETHAMINE PER 15 MG: Performed by: NURSE ANESTHETIST, CERTIFIED REGISTERED

## 2023-05-11 PROCEDURE — 27130 TOTAL HIP ARTHROPLASTY: CPT | Performed by: PHYSICIAN ASSISTANT

## 2023-05-11 PROCEDURE — 0 HYDROMORPHONE 1 MG/ML SOLUTION: Performed by: NURSE ANESTHETIST, CERTIFIED REGISTERED

## 2023-05-11 PROCEDURE — 25010000002 PROPOFOL 10 MG/ML EMULSION: Performed by: NURSE ANESTHETIST, CERTIFIED REGISTERED

## 2023-05-11 PROCEDURE — S0260 H&P FOR SURGERY: HCPCS | Performed by: STUDENT IN AN ORGANIZED HEALTH CARE EDUCATION/TRAINING PROGRAM

## 2023-05-11 PROCEDURE — 25010000002 FENTANYL CITRATE (PF) 50 MCG/ML SOLUTION: Performed by: NURSE ANESTHETIST, CERTIFIED REGISTERED

## 2023-05-11 PROCEDURE — 85014 HEMATOCRIT: CPT | Performed by: STUDENT IN AN ORGANIZED HEALTH CARE EDUCATION/TRAINING PROGRAM

## 2023-05-11 PROCEDURE — 25010000002 HYDROMORPHONE 1 MG/ML SOLUTION: Performed by: NURSE ANESTHETIST, CERTIFIED REGISTERED

## 2023-05-11 PROCEDURE — 25010000002 KETOROLAC TROMETHAMINE PER 15 MG: Performed by: STUDENT IN AN ORGANIZED HEALTH CARE EDUCATION/TRAINING PROGRAM

## 2023-05-11 DEVICE — SCRW ACET CORT TRILOGY S/TAP 6.5X25: Type: IMPLANTABLE DEVICE | Site: HIP | Status: FUNCTIONAL

## 2023-05-11 DEVICE — LINER ACET G7 VIVACIT/E NTRL HXPE SZB 32MM: Type: IMPLANTABLE DEVICE | Site: HIP | Status: FUNCTIONAL

## 2023-05-11 DEVICE — SHLL ACET G7 PPS LTD/HL TI SZB 46MM: Type: IMPLANTABLE DEVICE | Site: HIP | Status: FUNCTIONAL

## 2023-05-11 DEVICE — SUT NONABS MAXBRAID NMBR5 K60 38IN WHT/BLU: Type: IMPLANTABLE DEVICE | Site: HIP | Status: FUNCTIONAL

## 2023-05-11 DEVICE — TOTAL HIP PRIMARY: Type: IMPLANTABLE DEVICE | Site: HIP | Status: FUNCTIONAL

## 2023-05-11 DEVICE — BIOLOX® DELTA, CERAMIC FEMORAL HEAD, S, Ø 32/-3.5, TAPER 12/14
Type: IMPLANTABLE DEVICE | Site: HIP | Status: FUNCTIONAL
Brand: BIOLOX® DELTA

## 2023-05-11 DEVICE — IMPLANTABLE DEVICE: Type: IMPLANTABLE DEVICE | Site: HIP | Status: FUNCTIONAL

## 2023-05-11 RX ORDER — PROPOFOL 10 MG/ML
VIAL (ML) INTRAVENOUS AS NEEDED
Status: DISCONTINUED | OUTPATIENT
Start: 2023-05-11 | End: 2023-05-11 | Stop reason: SURG

## 2023-05-11 RX ORDER — AMOXICILLIN 250 MG
2 CAPSULE ORAL 2 TIMES DAILY
Status: DISCONTINUED | OUTPATIENT
Start: 2023-05-11 | End: 2023-05-12 | Stop reason: HOSPADM

## 2023-05-11 RX ORDER — ACETAMINOPHEN 500 MG
1000 TABLET ORAL ONCE
Status: COMPLETED | OUTPATIENT
Start: 2023-05-11 | End: 2023-05-11

## 2023-05-11 RX ORDER — ARIPIPRAZOLE 10 MG/1
10 TABLET ORAL DAILY
Status: DISCONTINUED | OUTPATIENT
Start: 2023-05-12 | End: 2023-05-12 | Stop reason: HOSPADM

## 2023-05-11 RX ORDER — ONDANSETRON 2 MG/ML
4 INJECTION INTRAMUSCULAR; INTRAVENOUS EVERY 6 HOURS PRN
Status: DISCONTINUED | OUTPATIENT
Start: 2023-05-11 | End: 2023-05-12 | Stop reason: HOSPADM

## 2023-05-11 RX ORDER — GABAPENTIN 300 MG/1
600 CAPSULE ORAL EVERY 8 HOURS SCHEDULED
Status: DISCONTINUED | OUTPATIENT
Start: 2023-05-11 | End: 2023-05-12 | Stop reason: HOSPADM

## 2023-05-11 RX ORDER — ATORVASTATIN CALCIUM 10 MG/1
20 TABLET, FILM COATED ORAL NIGHTLY
Status: DISCONTINUED | OUTPATIENT
Start: 2023-05-11 | End: 2023-05-12 | Stop reason: HOSPADM

## 2023-05-11 RX ORDER — SODIUM CHLORIDE, SODIUM LACTATE, POTASSIUM CHLORIDE, CALCIUM CHLORIDE 600; 310; 30; 20 MG/100ML; MG/100ML; MG/100ML; MG/100ML
9 INJECTION, SOLUTION INTRAVENOUS CONTINUOUS PRN
Status: DISCONTINUED | OUTPATIENT
Start: 2023-05-11 | End: 2023-05-12 | Stop reason: HOSPADM

## 2023-05-11 RX ORDER — TRANEXAMIC ACID 10 MG/ML
1000 INJECTION, SOLUTION INTRAVENOUS ONCE
Status: COMPLETED | OUTPATIENT
Start: 2023-05-11 | End: 2023-05-11

## 2023-05-11 RX ORDER — CARVEDILOL 6.25 MG/1
6.25 TABLET ORAL 2 TIMES DAILY
Status: DISCONTINUED | OUTPATIENT
Start: 2023-05-11 | End: 2023-05-12 | Stop reason: HOSPADM

## 2023-05-11 RX ORDER — ONDANSETRON 2 MG/ML
INJECTION INTRAMUSCULAR; INTRAVENOUS AS NEEDED
Status: DISCONTINUED | OUTPATIENT
Start: 2023-05-11 | End: 2023-05-11 | Stop reason: SURG

## 2023-05-11 RX ORDER — NALOXONE HYDROCHLORIDE 4 MG/.1ML
SPRAY NASAL
Qty: 2 EACH | Refills: 0 | Status: SHIPPED | OUTPATIENT
Start: 2023-05-11

## 2023-05-11 RX ORDER — SODIUM CHLORIDE, SODIUM LACTATE, POTASSIUM CHLORIDE, CALCIUM CHLORIDE 600; 310; 30; 20 MG/100ML; MG/100ML; MG/100ML; MG/100ML
100 INJECTION, SOLUTION INTRAVENOUS CONTINUOUS
Status: DISCONTINUED | OUTPATIENT
Start: 2023-05-11 | End: 2023-05-12 | Stop reason: HOSPADM

## 2023-05-11 RX ORDER — QUETIAPINE FUMARATE 100 MG/1
100 TABLET, FILM COATED ORAL NIGHTLY
COMMUNITY
Start: 2023-05-09

## 2023-05-11 RX ORDER — AMOXICILLIN 250 MG
2 CAPSULE ORAL 2 TIMES DAILY
Qty: 20 TABLET | Refills: 0 | Status: SHIPPED | OUTPATIENT
Start: 2023-05-11 | End: 2023-05-12 | Stop reason: SDUPTHER

## 2023-05-11 RX ORDER — CEFAZOLIN SODIUM 2 G/100ML
2 INJECTION, SOLUTION INTRAVENOUS EVERY 8 HOURS
Status: COMPLETED | OUTPATIENT
Start: 2023-05-11 | End: 2023-05-11

## 2023-05-11 RX ORDER — KETOROLAC TROMETHAMINE 30 MG/ML
INJECTION, SOLUTION INTRAMUSCULAR; INTRAVENOUS AS NEEDED
Status: DISCONTINUED | OUTPATIENT
Start: 2023-05-11 | End: 2023-05-11 | Stop reason: SURG

## 2023-05-11 RX ORDER — ACETAMINOPHEN 500 MG
1000 TABLET ORAL EVERY 8 HOURS
Status: DISCONTINUED | OUTPATIENT
Start: 2023-05-11 | End: 2023-05-12 | Stop reason: HOSPADM

## 2023-05-11 RX ORDER — LIDOCAINE HYDROCHLORIDE 20 MG/ML
INJECTION, SOLUTION EPIDURAL; INFILTRATION; INTRACAUDAL; PERINEURAL AS NEEDED
Status: DISCONTINUED | OUTPATIENT
Start: 2023-05-11 | End: 2023-05-11 | Stop reason: SURG

## 2023-05-11 RX ORDER — ONDANSETRON 4 MG/1
4 TABLET, FILM COATED ORAL EVERY 6 HOURS PRN
Status: DISCONTINUED | OUTPATIENT
Start: 2023-05-11 | End: 2023-05-12 | Stop reason: HOSPADM

## 2023-05-11 RX ORDER — ONDANSETRON 2 MG/ML
4 INJECTION INTRAMUSCULAR; INTRAVENOUS ONCE AS NEEDED
Status: DISCONTINUED | OUTPATIENT
Start: 2023-05-11 | End: 2023-05-11 | Stop reason: HOSPADM

## 2023-05-11 RX ORDER — CELECOXIB 100 MG/1
200 CAPSULE ORAL ONCE
Status: COMPLETED | OUTPATIENT
Start: 2023-05-11 | End: 2023-05-11

## 2023-05-11 RX ORDER — QUETIAPINE FUMARATE 25 MG/1
50 TABLET, FILM COATED ORAL NIGHTLY
Status: DISCONTINUED | OUTPATIENT
Start: 2023-05-11 | End: 2023-05-12 | Stop reason: HOSPADM

## 2023-05-11 RX ORDER — NALOXONE HCL 0.4 MG/ML
0.4 VIAL (ML) INJECTION
Status: DISCONTINUED | OUTPATIENT
Start: 2023-05-11 | End: 2023-05-12 | Stop reason: HOSPADM

## 2023-05-11 RX ORDER — CEFAZOLIN SODIUM IN 0.9 % NACL 3 G/100 ML
3 INTRAVENOUS SOLUTION, PIGGYBACK (ML) INTRAVENOUS ONCE
Status: DISCONTINUED | OUTPATIENT
Start: 2023-05-11 | End: 2023-05-11 | Stop reason: SDUPTHER

## 2023-05-11 RX ORDER — AMANTADINE HYDROCHLORIDE 100 MG/1
100 CAPSULE, GELATIN COATED ORAL DAILY
Status: DISCONTINUED | OUTPATIENT
Start: 2023-05-12 | End: 2023-05-12 | Stop reason: HOSPADM

## 2023-05-11 RX ORDER — OXYCODONE HYDROCHLORIDE 5 MG/1
5 TABLET ORAL EVERY 4 HOURS PRN
Qty: 40 TABLET | Refills: 0 | Status: SHIPPED | OUTPATIENT
Start: 2023-05-11 | End: 2023-05-12 | Stop reason: SDUPTHER

## 2023-05-11 RX ORDER — ALBUTEROL SULFATE 2.5 MG/3ML
2.5 SOLUTION RESPIRATORY (INHALATION) EVERY 6 HOURS PRN
Status: DISCONTINUED | OUTPATIENT
Start: 2023-05-11 | End: 2023-05-12 | Stop reason: HOSPADM

## 2023-05-11 RX ORDER — CEFAZOLIN SODIUM 2 G/100ML
2 INJECTION, SOLUTION INTRAVENOUS ONCE
Status: COMPLETED | OUTPATIENT
Start: 2023-05-11 | End: 2023-05-11

## 2023-05-11 RX ORDER — MAGNESIUM HYDROXIDE 1200 MG/15ML
LIQUID ORAL AS NEEDED
Status: DISCONTINUED | OUTPATIENT
Start: 2023-05-11 | End: 2023-05-11 | Stop reason: HOSPADM

## 2023-05-11 RX ORDER — ASPIRIN 325 MG
325 TABLET ORAL EVERY 12 HOURS SCHEDULED
Status: DISCONTINUED | OUTPATIENT
Start: 2023-05-12 | End: 2023-05-12 | Stop reason: HOSPADM

## 2023-05-11 RX ORDER — ASPIRIN 325 MG
325 TABLET ORAL EVERY 12 HOURS SCHEDULED
Qty: 60 TABLET | Refills: 0 | Status: SHIPPED | OUTPATIENT
Start: 2023-05-12 | End: 2023-05-12 | Stop reason: SDUPTHER

## 2023-05-11 RX ORDER — ROCURONIUM BROMIDE 10 MG/ML
INJECTION, SOLUTION INTRAVENOUS AS NEEDED
Status: DISCONTINUED | OUTPATIENT
Start: 2023-05-11 | End: 2023-05-11 | Stop reason: SURG

## 2023-05-11 RX ORDER — FERROUS SULFATE 325(65) MG
325 TABLET ORAL
Status: DISCONTINUED | OUTPATIENT
Start: 2023-05-11 | End: 2023-05-12 | Stop reason: HOSPADM

## 2023-05-11 RX ORDER — PROMETHAZINE HYDROCHLORIDE 12.5 MG/1
12.5 SUPPOSITORY RECTAL EVERY 6 HOURS PRN
Status: DISCONTINUED | OUTPATIENT
Start: 2023-05-11 | End: 2023-05-12 | Stop reason: HOSPADM

## 2023-05-11 RX ORDER — DEXMEDETOMIDINE HYDROCHLORIDE 100 UG/ML
INJECTION, SOLUTION INTRAVENOUS AS NEEDED
Status: DISCONTINUED | OUTPATIENT
Start: 2023-05-11 | End: 2023-05-11 | Stop reason: SURG

## 2023-05-11 RX ORDER — ACETAMINOPHEN 325 MG/1
325 TABLET ORAL EVERY 4 HOURS PRN
Status: DISCONTINUED | OUTPATIENT
Start: 2023-05-11 | End: 2023-05-12 | Stop reason: HOSPADM

## 2023-05-11 RX ORDER — METHOCARBAMOL 750 MG/1
750 TABLET, FILM COATED ORAL EVERY 8 HOURS PRN
Status: DISCONTINUED | OUTPATIENT
Start: 2023-05-11 | End: 2023-05-12 | Stop reason: HOSPADM

## 2023-05-11 RX ORDER — FAMOTIDINE 20 MG/1
40 TABLET, FILM COATED ORAL DAILY
Status: DISCONTINUED | OUTPATIENT
Start: 2023-05-11 | End: 2023-05-12 | Stop reason: HOSPADM

## 2023-05-11 RX ORDER — OXYCODONE HYDROCHLORIDE 5 MG/1
5 TABLET ORAL
Status: DISCONTINUED | OUTPATIENT
Start: 2023-05-11 | End: 2023-05-11 | Stop reason: HOSPADM

## 2023-05-11 RX ORDER — AMITRIPTYLINE HYDROCHLORIDE 50 MG/1
50 TABLET, FILM COATED ORAL NIGHTLY
Status: DISCONTINUED | OUTPATIENT
Start: 2023-05-11 | End: 2023-05-12 | Stop reason: HOSPADM

## 2023-05-11 RX ORDER — PROMETHAZINE HYDROCHLORIDE 25 MG/1
25 SUPPOSITORY RECTAL ONCE AS NEEDED
Status: DISCONTINUED | OUTPATIENT
Start: 2023-05-11 | End: 2023-05-11 | Stop reason: HOSPADM

## 2023-05-11 RX ORDER — ESCITALOPRAM OXALATE 10 MG/1
10 TABLET ORAL DAILY
Status: DISCONTINUED | OUTPATIENT
Start: 2023-05-12 | End: 2023-05-12 | Stop reason: HOSPADM

## 2023-05-11 RX ORDER — BUDESONIDE AND FORMOTEROL FUMARATE DIHYDRATE 160; 4.5 UG/1; UG/1
2 AEROSOL RESPIRATORY (INHALATION)
Status: DISCONTINUED | OUTPATIENT
Start: 2023-05-11 | End: 2023-05-12 | Stop reason: HOSPADM

## 2023-05-11 RX ORDER — OXYCODONE HYDROCHLORIDE 5 MG/1
10 TABLET ORAL EVERY 4 HOURS PRN
Status: DISCONTINUED | OUTPATIENT
Start: 2023-05-11 | End: 2023-05-12 | Stop reason: HOSPADM

## 2023-05-11 RX ORDER — MIDAZOLAM HYDROCHLORIDE 2 MG/2ML
2 INJECTION, SOLUTION INTRAMUSCULAR; INTRAVENOUS ONCE
Status: COMPLETED | OUTPATIENT
Start: 2023-05-11 | End: 2023-05-11

## 2023-05-11 RX ORDER — MEPERIDINE HYDROCHLORIDE 25 MG/ML
12.5 INJECTION INTRAMUSCULAR; INTRAVENOUS; SUBCUTANEOUS
Status: DISCONTINUED | OUTPATIENT
Start: 2023-05-11 | End: 2023-05-11 | Stop reason: HOSPADM

## 2023-05-11 RX ORDER — CHOLECALCIFEROL (VITAMIN D3) 125 MCG
5 CAPSULE ORAL NIGHTLY
Status: DISCONTINUED | OUTPATIENT
Start: 2023-05-11 | End: 2023-05-12 | Stop reason: HOSPADM

## 2023-05-11 RX ORDER — DEXAMETHASONE SODIUM PHOSPHATE 4 MG/ML
INJECTION, SOLUTION INTRA-ARTICULAR; INTRALESIONAL; INTRAMUSCULAR; INTRAVENOUS; SOFT TISSUE AS NEEDED
Status: DISCONTINUED | OUTPATIENT
Start: 2023-05-11 | End: 2023-05-11 | Stop reason: SURG

## 2023-05-11 RX ORDER — ACETAMINOPHEN 500 MG
1000 TABLET ORAL EVERY 8 HOURS
Qty: 60 TABLET | Refills: 0 | Status: SHIPPED | OUTPATIENT
Start: 2023-05-11 | End: 2023-05-12 | Stop reason: SDUPTHER

## 2023-05-11 RX ORDER — TRANEXAMIC ACID 10 MG/ML
1000 INJECTION, SOLUTION INTRAVENOUS ONCE
Status: DISCONTINUED | OUTPATIENT
Start: 2023-05-11 | End: 2023-05-11 | Stop reason: HOSPADM

## 2023-05-11 RX ORDER — OXYCODONE HYDROCHLORIDE 5 MG/1
5 TABLET ORAL EVERY 4 HOURS PRN
Status: DISCONTINUED | OUTPATIENT
Start: 2023-05-11 | End: 2023-05-12 | Stop reason: HOSPADM

## 2023-05-11 RX ORDER — KETOROLAC TROMETHAMINE 15 MG/ML
15 INJECTION, SOLUTION INTRAMUSCULAR; INTRAVENOUS EVERY 6 HOURS
Status: DISCONTINUED | OUTPATIENT
Start: 2023-05-11 | End: 2023-05-11

## 2023-05-11 RX ORDER — PROMETHAZINE HYDROCHLORIDE 12.5 MG/1
25 TABLET ORAL ONCE AS NEEDED
Status: DISCONTINUED | OUTPATIENT
Start: 2023-05-11 | End: 2023-05-11 | Stop reason: HOSPADM

## 2023-05-11 RX ORDER — PROMETHAZINE HYDROCHLORIDE 12.5 MG/1
12.5 TABLET ORAL EVERY 6 HOURS PRN
Status: DISCONTINUED | OUTPATIENT
Start: 2023-05-11 | End: 2023-05-12 | Stop reason: HOSPADM

## 2023-05-11 RX ORDER — FENTANYL CITRATE 50 UG/ML
INJECTION, SOLUTION INTRAMUSCULAR; INTRAVENOUS AS NEEDED
Status: DISCONTINUED | OUTPATIENT
Start: 2023-05-11 | End: 2023-05-11 | Stop reason: SURG

## 2023-05-11 RX ADMIN — CEFAZOLIN SODIUM 2 G: 2 INJECTION, SOLUTION INTRAVENOUS at 07:20

## 2023-05-11 RX ADMIN — ACETAMINOPHEN 1000 MG: 500 TABLET ORAL at 20:23

## 2023-05-11 RX ADMIN — CEFAZOLIN SODIUM 2 G: 2 INJECTION, SOLUTION INTRAVENOUS at 22:01

## 2023-05-11 RX ADMIN — SUGAMMADEX 200 MG: 100 INJECTION, SOLUTION INTRAVENOUS at 09:31

## 2023-05-11 RX ADMIN — DEXAMETHASONE SODIUM PHOSPHATE 8 MG: 4 INJECTION, SOLUTION INTRA-ARTICULAR; INTRALESIONAL; INTRAMUSCULAR; INTRAVENOUS; SOFT TISSUE at 07:28

## 2023-05-11 RX ADMIN — LIDOCAINE HYDROCHLORIDE 50 MG: 20 INJECTION, SOLUTION EPIDURAL; INFILTRATION; INTRACAUDAL; PERINEURAL at 07:22

## 2023-05-11 RX ADMIN — KETOROLAC TROMETHAMINE 30 MG: 30 INJECTION, SOLUTION INTRAMUSCULAR; INTRAVENOUS at 08:12

## 2023-05-11 RX ADMIN — FAMOTIDINE 40 MG: 20 TABLET ORAL at 11:34

## 2023-05-11 RX ADMIN — HYDROMORPHONE HYDROCHLORIDE 0.5 MG: 1 INJECTION, SOLUTION INTRAMUSCULAR; INTRAVENOUS; SUBCUTANEOUS at 10:08

## 2023-05-11 RX ADMIN — AMITRIPTYLINE HYDROCHLORIDE 50 MG: 50 TABLET, FILM COATED ORAL at 20:23

## 2023-05-11 RX ADMIN — CARVEDILOL 6.25 MG: 6.25 TABLET, FILM COATED ORAL at 20:23

## 2023-05-11 RX ADMIN — FERROUS SULFATE TAB 325 MG (65 MG ELEMENTAL FE) 325 MG: 325 (65 FE) TAB at 11:34

## 2023-05-11 RX ADMIN — GABAPENTIN 600 MG: 300 CAPSULE ORAL at 15:34

## 2023-05-11 RX ADMIN — ACETAMINOPHEN 1000 MG: 500 TABLET ORAL at 11:34

## 2023-05-11 RX ADMIN — SODIUM CHLORIDE, POTASSIUM CHLORIDE, SODIUM LACTATE AND CALCIUM CHLORIDE 100 ML/HR: 600; 310; 30; 20 INJECTION, SOLUTION INTRAVENOUS at 11:34

## 2023-05-11 RX ADMIN — BUDESONIDE AND FORMOTEROL FUMARATE DIHYDRATE 2 PUFF: 160; 4.5 AEROSOL RESPIRATORY (INHALATION) at 21:02

## 2023-05-11 RX ADMIN — HYDROMORPHONE HYDROCHLORIDE 1 MG: 1 INJECTION, SOLUTION INTRAMUSCULAR; INTRAVENOUS; SUBCUTANEOUS at 07:16

## 2023-05-11 RX ADMIN — TRANEXAMIC ACID 1000 MG: 10 INJECTION, SOLUTION INTRAVENOUS at 06:58

## 2023-05-11 RX ADMIN — ATORVASTATIN CALCIUM 20 MG: 10 TABLET, FILM COATED ORAL at 20:22

## 2023-05-11 RX ADMIN — QUETIAPINE FUMARATE 50 MG: 25 TABLET ORAL at 20:23

## 2023-05-11 RX ADMIN — DOCUSATE SODIUM 50MG AND SENNOSIDES 8.6MG 2 TABLET: 8.6; 5 TABLET, FILM COATED ORAL at 20:23

## 2023-05-11 RX ADMIN — CELECOXIB 200 MG: 100 CAPSULE ORAL at 06:56

## 2023-05-11 RX ADMIN — OXYCODONE HYDROCHLORIDE 5 MG: 5 TABLET ORAL at 09:52

## 2023-05-11 RX ADMIN — PROPOFOL 200 MG: 10 INJECTION, EMULSION INTRAVENOUS at 07:22

## 2023-05-11 RX ADMIN — SODIUM CHLORIDE, POTASSIUM CHLORIDE, SODIUM LACTATE AND CALCIUM CHLORIDE: 600; 310; 30; 20 INJECTION, SOLUTION INTRAVENOUS at 09:21

## 2023-05-11 RX ADMIN — ROCURONIUM BROMIDE 20 MG: 10 INJECTION, SOLUTION INTRAVENOUS at 08:05

## 2023-05-11 RX ADMIN — ONDANSETRON 4 MG: 2 INJECTION INTRAMUSCULAR; INTRAVENOUS at 08:12

## 2023-05-11 RX ADMIN — GABAPENTIN 600 MG: 300 CAPSULE ORAL at 22:01

## 2023-05-11 RX ADMIN — TRANEXAMIC ACID 1000 MG: 10 INJECTION, SOLUTION INTRAVENOUS at 09:02

## 2023-05-11 RX ADMIN — ROCURONIUM BROMIDE 50 MG: 10 INJECTION, SOLUTION INTRAVENOUS at 07:22

## 2023-05-11 RX ADMIN — MIDAZOLAM HYDROCHLORIDE 2 MG: 1 INJECTION, SOLUTION INTRAMUSCULAR; INTRAVENOUS at 06:58

## 2023-05-11 RX ADMIN — ROCURONIUM BROMIDE 20 MG: 10 INJECTION, SOLUTION INTRAVENOUS at 07:45

## 2023-05-11 RX ADMIN — OXYCODONE HYDROCHLORIDE 10 MG: 5 TABLET ORAL at 15:57

## 2023-05-11 RX ADMIN — FENTANYL CITRATE 50 MCG: 50 INJECTION, SOLUTION INTRAMUSCULAR; INTRAVENOUS at 08:06

## 2023-05-11 RX ADMIN — ACETAMINOPHEN 1000 MG: 500 TABLET ORAL at 06:56

## 2023-05-11 RX ADMIN — HYDROMORPHONE HYDROCHLORIDE 0.25 MG: 1 INJECTION, SOLUTION INTRAMUSCULAR; INTRAVENOUS; SUBCUTANEOUS at 09:52

## 2023-05-11 RX ADMIN — DEXMEDETOMIDINE HYDROCHLORIDE 20 MCG: 100 INJECTION, SOLUTION, CONCENTRATE INTRAVENOUS at 08:32

## 2023-05-11 RX ADMIN — Medication 5 MG: at 20:23

## 2023-05-11 RX ADMIN — DOCUSATE SODIUM 50MG AND SENNOSIDES 8.6MG 2 TABLET: 8.6; 5 TABLET, FILM COATED ORAL at 11:34

## 2023-05-11 RX ADMIN — SODIUM CHLORIDE, POTASSIUM CHLORIDE, SODIUM LACTATE AND CALCIUM CHLORIDE 9 ML/HR: 600; 310; 30; 20 INJECTION, SOLUTION INTRAVENOUS at 06:57

## 2023-05-11 RX ADMIN — CEFAZOLIN SODIUM 2 G: 2 INJECTION, SOLUTION INTRAVENOUS at 15:34

## 2023-05-11 RX ADMIN — FENTANYL CITRATE 50 MCG: 50 INJECTION, SOLUTION INTRAMUSCULAR; INTRAVENOUS at 07:31

## 2023-05-11 NOTE — H&P
" [facility]  [facility]   Patient Name: Casi Lopez  : 1975  MRN: [id number]  Primary Care Physician:  Adore Quispe APRN  Date of admission: 2023    Subjective   Subjective     Chief Complaint: Medical management of hypertension    HPI:    Casi Lopez is a 48 y.o. female with HTN, HLD, noninsulin-dependent type 2 diabetes, DARYL, asthma, anxiety/depression, osteoarthritis of the right hip with progressive worsening right hip pain that failed outpatient conservative management.  She underwent right total hip replacement on 2023.  Hospital service was consulted postoperatively to manage medical comorbidities.  Blood pressure is managed with Coreg 6.25 mg twice daily.  Recent blood pressure 141/85.  Diabetes managed with Jardiance and metformin.  Recent hemoglobin A1c 6.60.  Blood sugars have been controlled since admission.  No acute issues postop.  On 2 L of oxygen breathing comfortably.  Tolerating liquids, no N/V.  Denies uncontrolled pain. No complaints.     Personal History     Past Medical History:  HTN  HLD  T2DM  DARYL  Asthma  Primary OA     Past Surgical History:  Past Surgical History:   Procedure Laterality Date   • CARDIAC CATHETERIZATION N/A 2021    Procedure: Left Heart Cath - R radial;  Surgeon: NNAMDI Hill MD;  Location: [facility] INVASIVE LOCATION;  Service: Cardiology;  Laterality: N/A;   • CARDIAC SURGERY      \"repair of hole in heart\" HAD REPAIR OF ATRIAL-SEPTAL DEFECT IN    • HYSTERECTOMY     • LAPAROSCOPIC TUBAL LIGATION       Family History:   Family History   Problem Relation Age of Onset   • Diabetes Mother    • Hypertension Mother    • Hypertension Father    • Diabetes Father      Social History:   Social History     Socioeconomic History   • Marital status:    Tobacco Use   • Smoking status: Never   • Smokeless tobacco: Never   Vaping Use   • Vaping Use: Never used   Substance and Sexual Activity "   • Alcohol use: Never   • Drug use: Never   • Sexual activity: Defer         Home Medications:  ARIPiprazole, Beclomethasone Diprop HFA, Calcium Carb-Cholecalciferol, QUEtiapine, Vitamin D3, Vitamin E, albuterol sulfate HFA, amantadine, amitriptyline, atorvastatin, budesonide-formoterol, carvedilol, diclofenac, empagliflozin, escitalopram, gabapentin, melatonin, metFORMIN, methocarbamol, montelukast, oxyCODONE-acetaminophen, potassium chloride, and prazosin    Allergies:  No Known Allergies    Review of Systems  Constitutional:  No Fever, No Chills, +Fatigue  HEENT:  Denied complaints  Cardiovascular:  Denied complaints  Respiratory:  No Cough, No Dyspnea  Gastrointestinal:  No Nausea, No Vomiting  Genitourinary:   Denied complaints  Musculoskeletal:  + Right hip pain otherwise denied complaints  Neuro:   Denied complaints  Heme/Lymph:   Denied complaints    Objective   Objective     Vitals:   Temp:  [97.2 °F (36.2 °C)-98.9 °F (37.2 °C)] 97.3 °F (36.3 °C)  Heart Rate:  [76-94] 86  Resp:  [16-23] 22  BP: ()/(73-95) 141/85  Flow (L/min):  [4] 4    Physical Exam    Constitutional: Awake, conversant, no acute distress   Eyes: Pupils equal and reactive, no conjunctival injection   HENT: NCAT, nares patent, mucous membranes moist   Neck: Supple, trachea midline   Respiratory: Equal and clear to auscultation bilaterally, nonlabored respirations    Cardiovascular: RRR, no murmurs, no pedal edema   Gastrointestinal: Positive bowel sounds, soft, nontender, nondistended   Musculoskeletal: No gross joint deformities, no clubbing or cyanosis to extremities   Neurologic: Alert, Cranial Nerves grossly intact, speech clear   Skin: Warm and dry, no rashes or open wounds     Result Review    Result Review:  I have personally reviewed the results from the time of this admission to 5/11/2023 10:43 EDT and agree with these findings:  []  Laboratory  []  Microbiology  [x]  Radiology      XR Hip With or Without Pelvis 2 - 3 View  Right    Result Date: 5/11/2023  PROCEDURE: XR HIP W OR WO PELVIS 2-3 VIEW RIGHT  COMPARISON: [facility] , CR, XR HIP W OR WO PELVIS 2-3 VIEW RIGHT, 4/05/2023, 14:37.  [facility], CR, XR HIP W OR WO PELVIS 2-3 VIEW RIGHT, 5/11/2023, 8:11.  INDICATIONS: Post-Op RIGHT Hip Arthroplasty  FINDINGS:   Interval total right hip arthroplasty.  The hardware is intact.  No fractures, dislocations or acute osseous abnormalities are identified.  There are advanced degenerative changes in the left hip.  IMPRESSION: Total right hip arthroplasty without evidence of complication.   PILI WALDROP MD       Electronically Signed and Approved By: PILI WALDROP MD on 5/11/2023 at 10:15             []  EKG/Telemetry   []  Cardiology/Vascular   []  Pathology  []  Old records  []  Other:      Assessment & Plan   Assessment / Plan     Assessment/Plan:   Active Hospital Problems    Diagnosis  POA   • **S/P total right hip arthroplasty [Z96.641]  Not Applicable   • Hypertension [I10]  Yes   • T2DM (type 2 diabetes mellitus) [E11.9]  Yes   • Hyperlipidemia [E78.5]  Yes   • Asthma [J45.909]  Yes   • DARYL on CPAP [G47.33, Z99.89]  Not Applicable   • Depression [F32.A]  Yes   • Anxiety [F41.9]  Yes   • Arthritis of right hip [M16.11]  Unknown         POD #0 right hip arthroplasty  · Postop surgical management per orthopedic surgery  · Blood pressure controlled.  Continue home Coreg 6.25 mg twice a day.   · Hold home Jardiance and metformin.  Blood sugars at goal.  Given most recent hemoglobin A1c should not require significant dose of sliding scale insulin.  Will just monitor POC glucose before meals and at bedtime.  Carb consistent diet  · Continue home gabapentin 600 mg every 8 hours  · Restart home Symbicort and albuterol nebs as needed, wean to room air, encourage I-S  · Continue home amitriptyline, amantadine, aripiprazole, Seroquel  · Continue atorvastatin 20 mg nightly  · Continue care with PO/IV narcotics,  fluids,  antiemetics, bowel regimen per orders  · Okay to use home CPAP   · PT/OT evaluation  · Aspirin 325 mg daily for DVT prophylaxis  · H/H, BMP in AM       DVT prophylaxis:  Mechanical DVT prophylaxis orders are present.    CODE STATUS:    Code Status (Patient has no pulse and is not breathing): CPR (Attempt to Resuscitate)  Medical Interventions (Patient has pulse or is breathing): Full Support  Release to patient: Routine Release      Admission Status:  I believe this patient meets OUTPATIENT status.    Electronically signed by Ariel Davenport DO, 05/11/23, 10:43 AM EDT.

## 2023-05-11 NOTE — THERAPY EVALUATION
"Acute Care - Physical Therapy Initial Evaluation   [place]     Patient Name: Casi Lopez  : 1975  MRN: [id number]  Today's Date: 2023      Visit Dx: Admit date: 2023     Referring Physician: Ariel Davenport DO     Surgery Date:2023   Procedure(s) (LRB):  RIGHT TOTAL HIP ARTHROPLASTY ANTERIOR (Right)         ICD-10-CM ICD-9-CM   1. Difficulty walking  R26.2 719.7   2. Primary osteoarthritis of right hip  M16.11 715.15     Patient Active Problem List   Diagnosis   • DARYL (obstructive sleep apnea)   • Snoring   • Moderate asthma   • Angina pectoris   • Arthritis of right hip   • Hypertension   • T2DM (type 2 diabetes mellitus)   • Hyperlipidemia   • Asthma   • S/P total right hip arthroplasty   • DARYL on CPAP   • Depression   • Anxiety     Past Medical History:   Diagnosis Date   • Acid reflux    • Anxiety    • Arthritis    • Asthma     HAS INHALERS SCHEDULED AND PRN   • Depression    • Diabetes mellitus     [facility] [facility]   • H/O precordial chest pain     FOLLOWED BY DR HILL. NILO CP/SOA. REPORTS WORKS FULL TIME IN [employer].   • Hyperlipidemia    • Hypertension    • Injury of back    • Migraines    • Pain management     [facility]   • Sleep apnea     REPORTS IS SUPPOSED TO BE RECEIVING CPAP     Past Surgical History:   Procedure Laterality Date   • CARDIAC CATHETERIZATION N/A 2021    Procedure: Left Heart Cath - R radial;  Surgeon: NNAMDI Hill MD;  Location: [facility] INVASIVE LOCATION;  Service: Cardiology;  Laterality: N/A;   • CARDIAC SURGERY      \"repair of hole in heart\" HAD REPAIR OF ATRIAL-SEPTAL DEFECT IN    • HYSTERECTOMY     • LAPAROSCOPIC TUBAL LIGATION       PT Assessment (last 12 hours)     PT Evaluation and Treatment     Row Name 23 1030          Physical Therapy Time and Intention    Subjective Information complains of;pain  -DP     Document Type evaluation  -DP     Mode of Treatment individual therapy;physical therapy  -DP     Patient Effort good  " -DP     Row Name 05/11/23 1030          General Information    Patient Profile Reviewed yes  -DP     Patient Observations alert;cooperative;agree to therapy  -DP     Prior Level of Function independent:;gait;transfer;bed mobility;ADL's  -DP     Equipment Currently Used at Home none  -DP     Barriers to Rehab none identified  -DP     Row Name 05/11/23 1030          Living Environment    Current Living Arrangements home  -DP     Home Accessibility stairs to enter home  -DP     People in Home child(jeison), adult  -DP     Row Name 05/11/23 1030          Home Main Entrance    Number of Stairs, Main Entrance two  -DP     Row Name 05/11/23 1030          Range of Motion (ROM)    Range of Motion bilateral lower extremities;ROM is WFL  -DP     Row Name 05/11/23 1030          Strength (Manual Muscle Testing)    Strength (Manual Muscle Testing) bilateral lower extremities  4+/5 except R hip assessed as 3-/5  -DP     Row Name 05/11/23 1030          Mobility    Extremity Weight-bearing Status right lower extremity  -DP     Right Lower Extremity (Weight-bearing Status) weight-bearing as tolerated (WBAT)  -DP     Row Name 05/11/23 1030          Bed Mobility    Bed Mobility supine-sit  -DP     Supine-Sit [place] (Bed Mobility) moderate assist (50% patient effort)  -DP     Row Name 05/11/23 1030          Transfers    Transfers sit-stand transfer  -DP     Row Name 05/11/23 1030          Sit-Stand Transfer    Sit-Stand [place] (Transfers) minimum assist (75% patient effort)  -DP     Row Name 05/11/23 1030          Gait/Stairs (Locomotion)    Gait/Stairs Locomotion gait/ambulation assistive device  -DP     [place] Level (Gait) contact guard  -DP     Assistive Device (Gait) walker, front-wheeled  -DP     Distance in Feet (Gait) 16  -DP     Row Name 05/11/23 1030          Balance    Balance Assessment standing dynamic balance  -DP     Dynamic Standing Balance contact guard  -DP     Position/Device Used, Standing Balance  "walker, front-wheeled;supported  -DP     Row Name             Wound 05/11/23 0800 Right anterior hip Incision    Wound - Properties Group Placement Date: 05/11/23  -SC Placement Time: 0800  -SC Present on Hospital Admission: N  -SC Side: Right  -SC Orientation: anterior  -SC Location: hip  -SC Primary Wound Type: Incision  -SC    Retired Wound - Properties Group Placement Date: 05/11/23  -SC Placement Time: 0800  -SC Present on Hospital Admission: N  -SC Side: Right  -SC Orientation: anterior  -SC Location: hip  -SC Primary Wound Type: Incision  -SC    Retired Wound - Properties Group Date first assessed: 05/11/23  -SC Time first assessed: 0800  -SC Present on Hospital Admission: N  -SC Side: Right  -SC Location: hip  -SC Primary Wound Type: Incision  -SC    Row Name 05/11/23 1030          Plan of Care Review    Plan of Care Reviewed With patient  -DP     Outcome Evaluation Patient had a right total hip replacement today.  She is weightbearing as tolerated on right.  Patient will need inpatient PT services and continued rehab in an outpatient setting upon discharge.  Patient will also benefit from use of a rolling walker and a bedside commode upon return home.  -DP     Row Name 05/11/23 1030          Therapy Assessment/Plan (PT)    Patient/Family Therapy Goals Statement (PT) \"Back to Normal, less pain\"  -DP     Rehab Potential (PT) good, to achieve stated therapy goals  -DP     Criteria for Skilled Interventions Met (PT) yes;meets criteria  -DP     Therapy Frequency (PT) 2 times/day  -DP     Predicted Duration of Therapy Intervention (PT) 10 days  -DP     Problem List (PT) problems related to;balance;mobility  -DP     Activity Limitations Related to Problem List (PT) unable to transfer safely;unable to ambulate safely  -DP     Row Name 05/11/23 1030          PT Evaluation Complexity    History, PT Evaluation Complexity no personal factors and/or comorbidities  -DP     Examination of Body Systems (PT Eval Complexity) " total of 4 or more elements  -DP     Clinical Presentation (PT Evaluation Complexity) stable  -DP     Clinical Decision Making (PT Evaluation Complexity) low complexity  -DP     Overall Complexity (PT Evaluation Complexity) low complexity  -DP     Row Name 05/11/23 1030          Physical Therapy Goals    Transfer Goal Selection (PT) transfer, PT goal 1  -DP     Gait Training Goal Selection (PT) gait training, PT goal 1  -DP     Row Name 05/11/23 1030          Transfer Goal 1 (PT)    Activity/Assistive Device (Transfer Goal 1, PT) sit-to-stand/stand-to-sit  -DP     Walker Level/Cues Needed (Transfer Goal 1, PT) supervision required  -DP     Time Frame (Transfer Goal 1, PT) 10 days  -DP     Row Name 05/11/23 1030          Gait Training Goal 1 (PT)    Activity/Assistive Device (Gait Training Goal 1, PT) assistive device use;walker, rolling  -DP     Walker Level (Gait Training Goal 1, PT) supervision required  -DP     Distance (Gait Training Goal 1, PT) 300  -DP     Time Frame (Gait Training Goal 1, PT) 10 days  -DP           User Key  (r) = Recorded By, (t) = Taken By, (c) = Cosigned By    Initials Name Provider Type    Janna Cormier RN Registered Nurse    Mindi Alcaraz, PT Physical Therapist                  PT Recommendation and Plan  Anticipated Discharge Disposition (PT): home with outpatient therapy services  Planned Therapy Interventions (PT): balance training, gait training, bed mobility training, strengthening, transfer training  Therapy Frequency (PT): 2 times/day  Plan of Care Reviewed With: patient  Outcome Evaluation: Patient had a right total hip replacement today.  She is weightbearing as tolerated on right.  Patient will need inpatient PT services and continued rehab in an outpatient setting upon discharge.  Patient will also benefit from use of a rolling walker and a bedside commode upon return home.   Outcome Measures     Row Name 05/11/23 1200             How much help from  another person do you currently need...    Turning from your back to your side while in flat bed without using bedrails? 3  -DP      Moving from lying on back to sitting on the side of a flat bed without bedrails? 2  -DP      Moving to and from a bed to a chair (including a wheelchair)? 2  -DP      Standing up from a chair using your arms (e.g., wheelchair, bedside chair)? 3  -DP      Climbing 3-5 steps with a railing? 3  -DP      To walk in hospital room? 3  -DP      AM-PAC 6 Clicks Score (PT) 16  -DP         Functional Assessment    Outcome Measure Options AM-PAC 6 Clicks Basic Mobility (PT)  -DP            User Key  (r) = Recorded By, (t) = Taken By, (c) = Cosigned By    Initials Name Provider Type    Mindi Alcaraz, PT Physical Therapist                 Time Calculation:    PT Charges     Row Name 05/11/23 1204             Time Calculation    PT Received On 05/11/23  -DP      PT Goal Re-Cert Due Date 05/20/23  -DP         Untimed Charges    PT Eval/Re-eval Minutes 40  -DP         Total Minutes    Untimed Charges Total Minutes 40  -DP       Total Minutes 40  -DP            User Key  (r) = Recorded By, (t) = Taken By, (c) = Cosigned By    Initials Name Provider Type    Mindi Alcaraz, PT Physical Therapist              Therapy Charges for Today     Code Description Service Date Service Provider Modifiers Qty    [id number] HC PT EVAL LOW COMPLEXITY 3 5/11/2023 Mindi Sutherland, PT GP 1          PT G-Codes  Outcome Measure Options: AM-PAC 6 Clicks Basic Mobility (PT)  AM-PAC 6 Clicks Score (PT): 16    Mindi Sutherland, PT  5/11/2023

## 2023-05-11 NOTE — DISCHARGE INSTRUCTIONS
Orthopedic instructions: You may bear weight as tolerated on the right lower extremity.  Follow the dressing change instructions provided by the nursing staff.  Perform your physical therapy exercises as instructed.  Monitor for increasing redness, drainage, fevers, chills.  Take aspirin 325 mg twice daily for DVT prophylaxis.  2-week orthopedic follow-up for reevaluation.

## 2023-05-11 NOTE — PLAN OF CARE
Problem: Adult Inpatient Plan of Care  Goal: Plan of Care Review  Outcome: Ongoing, Progressing  Goal: Patient-Specific Goal (Individualized)  Outcome: Ongoing, Progressing  Goal: Absence of Hospital-Acquired Illness or Injury  Outcome: Ongoing, Progressing  Intervention: Prevent Skin Injury  Recent Flowsheet Documentation  Taken 5/11/2023 1230 by Ivania Heller RN  Skin Protection:   adhesive use limited   tubing/devices free from skin contact  Intervention: Prevent and Manage VTE (Venous Thromboembolism) Risk  Recent Flowsheet Documentation  Taken 5/11/2023 1230 by Ivania Heller RN  VTE Prevention/Management:   bilateral   compression stockings on  Range of Motion: ROM (range of motion) performed  Taken 5/11/2023 1038 by Ivania Heller RN  Activity Management: (Simultaneous filing. User may be unaware of other data.)   ambulated in room   up in chair  Intervention: Prevent Infection  Recent Flowsheet Documentation  Taken 5/11/2023 1230 by Ivania Heller RN  Infection Prevention:   environmental surveillance performed   hand hygiene promoted   rest/sleep promoted   visitors restricted/screened   single patient room provided  Goal: Optimal Comfort and Wellbeing  Outcome: Ongoing, Progressing  Intervention: Monitor Pain and Promote Comfort  Recent Flowsheet Documentation  Taken 5/11/2023 1627 by Ivania Heller RN  Pain Management Interventions:   see MAR   quiet environment facilitated   care clustered   cold applied  Taken 5/11/2023 1230 by Ivania Heller RN  Pain Management Interventions:   see MAR   quiet environment facilitated   care clustered   cold applied  Intervention: Provide Person-Centered Care  Recent Flowsheet Documentation  Taken 5/11/2023 1230 by Ivania Heller RN  Trust Relationship/Rapport:   care explained   emotional support provided   questions answered   thoughts/feelings acknowledged  Goal: Readiness for Transition of Care  Outcome: Ongoing, Progressing  Intervention:  Mutually Develop Transition Plan  Recent Flowsheet Documentation  Taken 5/11/2023 1144 by Ivania Heller RN  Transportation Anticipated: family or friend will provide  Patient/Family Anticipated Services at Transition: durable medical equipment  Patient/Family Anticipates Transition to: home with family  Taken 5/11/2023 1141 by Ivania Heller RN  Equipment Currently Used at Home:   glucometer   cane, straight   cpap     Problem: Pain Acute  Goal: Acceptable Pain Control and Functional Ability  Outcome: Ongoing, Progressing  Intervention: Prevent or Manage Pain  Recent Flowsheet Documentation  Taken 5/11/2023 1230 by Ivania Heller RN  Sensory Stimulation Regulation: care clustered  Bowel Elimination Promotion:   adequate fluid intake promoted   ambulation promoted  Sleep/Rest Enhancement: awakenings minimized  Intervention: Develop Pain Management Plan  Recent Flowsheet Documentation  Taken 5/11/2023 1627 by Ivania Heller RN  Pain Management Interventions:   see MAR   quiet environment facilitated   care clustered   cold applied  Taken 5/11/2023 1230 by Ivania Heller RN  Pain Management Interventions:   see MAR   quiet environment facilitated   care clustered   cold applied  Intervention: Optimize Psychosocial Wellbeing  Recent Flowsheet Documentation  Taken 5/11/2023 1230 by Ivania Heller RN  Supportive Measures: active listening utilized  Diversional Activities:   television   smartphone     Problem: Adjustment to Surgery (Hip Arthroplasty)  Goal: Optimal Coping  Outcome: Ongoing, Progressing  Intervention: Support Psychosocial Response to Surgery and Mobility Changes  Recent Flowsheet Documentation  Taken 5/11/2023 1230 by Ivania Heller RN  Supportive Measures: active listening utilized     Problem: Bleeding (Hip Arthroplasty)  Goal: Absence of Bleeding  Outcome: Ongoing, Progressing     Problem: Bowel Motility Impaired (Hip Arthroplasty)  Goal: Effective Bowel Elimination  Outcome:  Ongoing, Progressing  Intervention: Enhance Bowel Motility and Elimination  Recent Flowsheet Documentation  Taken 5/11/2023 1230 by Ivania Heller RN  Bowel Elimination Management: toileting offered  Bowel Motility Enhancement:   ambulation promoted   fluid intake encouraged     Problem: Fluid and Electrolyte Imbalance (Hip Arthroplasty)  Goal: Fluid and Electrolyte Balance  Outcome: Ongoing, Progressing     Problem: Functional Ability Impaired (Hip Arthroplasty)  Goal: Optimal Functional Ability  Outcome: Ongoing, Progressing  Intervention: Promote Optimal Functional Status  Recent Flowsheet Documentation  Taken 5/11/2023 1038 by Ivania Heller RN  Activity Management: (Simultaneous filing. User may be unaware of other data.)   ambulated in room   up in chair  Assistive Device Utilized: (Simultaneous filing. User may be unaware of other data.)   gait belt   walker     Problem: Infection (Hip Arthroplasty)  Goal: Absence of Infection Signs and Symptoms  Outcome: Ongoing, Progressing     Problem: Neurovascular Compromise (Hip Arthroplasty)  Goal: Intact Neurovascular Status  Outcome: Ongoing, Progressing  Intervention: Prevent or Manage Neurovascular Compromise  Recent Flowsheet Documentation  Taken 5/11/2023 1230 by Ivania Heller RN  Compartment Syndrome Management: active flexion/extension encouraged  Compartment Syndrome Surveillance: no pain with passive muscle stretch     Problem: Ongoing Anesthesia Effects (Hip Arthroplasty)  Goal: Anesthesia/Sedation Recovery  Outcome: Ongoing, Progressing  Intervention: Optimize Anesthesia Recovery  Recent Flowsheet Documentation  Taken 5/11/2023 1723 by Ivaina Heller RN  Patient Tolerance (IS):   good   no adverse signs/symptoms present  Administration (IS): self-administered  Level Incentive Spirometer (mL): 2500  Number of Repetitions (IS): 10  Taken 5/11/2023 1520 by Ivania Heller RN  Patient Tolerance (IS):   good   no adverse signs/symptoms  present  Administration (IS): self-administered  Level Incentive Spirometer (mL): 2500  Number of Repetitions (IS): 10  Taken 5/11/2023 1400 by Ivania Heller RN  Patient Tolerance (IS):   good   no adverse signs/symptoms present  Administration (IS): self-administered  Level Incentive Spirometer (mL): 2500  Number of Repetitions (IS): 10  Taken 5/11/2023 1230 by Ivania Heller RN  Reorientation Measures: clock in view  Taken 5/11/2023 1145 by Ivania Heller RN  Patient Tolerance (IS):   good   no adverse signs/symptoms present  Administration (IS): self-administered  Level Incentive Spirometer (mL): 2500  Number of Repetitions (IS): 10     Problem: Pain (Hip Arthroplasty)  Goal: Acceptable Pain Control  Outcome: Ongoing, Progressing  Intervention: Prevent or Manage Pain  Recent Flowsheet Documentation  Taken 5/11/2023 1627 by Ivania Heller RN  Pain Management Interventions:   see MAR   quiet environment facilitated   care clustered   cold applied  Taken 5/11/2023 1230 by Ivania Heller RN  Pain Management Interventions:   see MAR   quiet environment facilitated   care clustered   cold applied  Diversional Activities:   television   smartphone     Problem: Postoperative Nausea and Vomiting (Hip Arthroplasty)  Goal: Nausea and Vomiting Relief  Outcome: Ongoing, Progressing     Problem: Postoperative Urinary Retention (Hip Arthroplasty)  Goal: Effective Urinary Elimination  Outcome: Ongoing, Progressing  Intervention: Monitor and Manage Urinary Retention  Recent Flowsheet Documentation  Taken 5/11/2023 1230 by Ivania Heller RN  Urinary Elimination Promotion: toileting offered     Problem: Respiratory Compromise (Hip Arthroplasty)  Goal: Effective Oxygenation and Ventilation  Outcome: Ongoing, Progressing     Problem: Asthma Comorbidity  Goal: Maintenance of Asthma Control  Outcome: Ongoing, Progressing     Problem: Diabetes Comorbidity  Goal: Blood Glucose Level Within Targeted Range  Outcome:  Ongoing, Progressing     Problem: Hypertension Comorbidity  Goal: Blood Pressure in Desired Range  Outcome: Ongoing, Progressing     Problem: Obstructive Sleep Apnea Risk or Actual Comorbidity Management  Goal: Unobstructed Breathing During Sleep  Outcome: Ongoing, Progressing   Goal Outcome Evaluation:   Pt has had no new changes throughout shift and continues to remain stable. Pt had right JAMI performed this morning by DR. Gruber. Pt has had complaints of pain that has been controlled with PRN medication. Pt has been medicated x1. Pt is x1 assist with walker to bathroom. Pt will DC home tomorrow. Pt will need DME walker and use here pharmacy for DC meds.

## 2023-05-11 NOTE — ANESTHESIA PREPROCEDURE EVALUATION
Anesthesia Evaluation     Patient summary reviewed and Nursing notes reviewed   no history of anesthetic complications:  NPO Solid Status: > 8 hours  NPO Liquid Status: > 2 hours           Airway   Mallampati: III  TM distance: >3 FB  Neck ROM: full  No difficulty expected  Dental    (+) upper dentures    Pulmonary - normal exam    breath sounds clear to auscultation  (+) asthma,sleep apnea,   Cardiovascular - normal exam  Exercise tolerance: good (4-7 METS)    Patient on routine beta blocker and Beta blocker given within 24 hours of surgery  Rhythm: regular  Rate: normal    (+) hypertension, CAD, CABG >6 Months, angina, hyperlipidemia,       Neuro/Psych  (+) headaches, psychiatric history,    GI/Hepatic/Renal/Endo    (+)  GERD,  diabetes mellitus type 2,     Musculoskeletal     Abdominal    Substance History - negative use     OB/GYN negative ob/gyn ROS         Other   arthritis,      ROS/Med Hx Other: >4METS, WORKS FT. HX HTN, DM,ASD REPAIR 1980. CATH 2021 EF 60%, NO SIGNIFICANT MR. CARDS CLEARANCE. TJR. KT                 Anesthesia Plan    ASA 3     general and ERAS Protocol     Reason for not using neuraxial anesthesia or peripheral nerve block: Patient Preference  (Patient understands anesthesia not responsible for dental damage.)  intravenous induction     Anesthetic plan, risks, benefits, and alternatives have been provided, discussed and informed consent has been obtained with: patient.  Pre-procedure education provided  Use of blood products discussed with patient  Consented to blood products.   Plan discussed with CRNA.        CODE STATUS:

## 2023-05-11 NOTE — PLAN OF CARE
Goal Outcome Evaluation:  Plan of Care Reviewed With: patient           Outcome Evaluation: Patient had a right total hip replacement today.  She is weightbearing as tolerated on right.  Patient will need inpatient PT services and continued rehab in an outpatient setting upon discharge.  Patient will also benefit from use of a rolling walker and a bedside commode upon return home.      PT Evaluation Complexity  History, PT Evaluation Complexity: no personal factors and/or comorbidities  Examination of Body Systems (PT Eval Complexity): total of 4 or more elements  Clinical Presentation (PT Evaluation Complexity): stable  Clinical Decision Making (PT Evaluation Complexity): low complexity  Overall Complexity (PT Evaluation Complexity): low complexity

## 2023-05-11 NOTE — ANESTHESIA POSTPROCEDURE EVALUATION
Patient: Casi Lopez    Procedure Summary     Date: 05/11/23 Room / Location: [facility] OR 03 / [facility] MAIN OR    Anesthesia Start: 0716 Anesthesia Stop: 0940    Procedure: RIGHT TOTAL HIP ARTHROPLASTY ANTERIOR (Right: Hip) Diagnosis:       Primary osteoarthritis of right hip      (Primary osteoarthritis of right hip [M16.11])    Surgeons: Elias Gruber MD Provider: Gasper Calero MD    Anesthesia Type: general, ERAS Protocol ASA Status: 3          Anesthesia Type: general, ERAS Protocol    Vitals  Vitals Value Taken Time   /81 05/11/23 1010   Temp 37 °C (98.6 °F) 05/11/23 1010   Pulse 83 05/11/23 1011   Resp 18 05/11/23 1010   SpO2 96 % 05/11/23 1011   Vitals shown include unvalidated device data.        Post Anesthesia Care and Evaluation    Patient location during evaluation: PACU  Patient participation: complete - patient participated  Level of consciousness: awake and alert  Pain management: adequate    Airway patency: patent  Anesthetic complications: No anesthetic complications  PONV Status: none  Cardiovascular status: acceptable  Respiratory status: acceptable  Hydration status: acceptable    Comments: An Anesthesiologist personally participated in the most demanding procedures (including induction and emergence if applicable) of the anesthesia plan, and monitored the course of anesthesia administration at frequent intervals and remained physically present and available for immediate diagnosis and treatment of emergencies.

## 2023-05-11 NOTE — H&P
"[facility]   HISTORY AND PHYSICAL    Patient Name: Casi Lopez  : 1975  MRN: [id number]  Primary Care Physician:  Adore Quispe APRN  Date of admission: 2023    Subjective   Subjective     Chief Complaint: Right hip pain    History of Present Illness    Casi presents today for her right hip.  Patient has been treating her right hip osteoarthritis conservatively.  Patient's pain interferes with her quality of life.  She experiences difficulties rising from a chair and pain with ambulation.  She explains that her pain has continued to worsen.  She ambulates with a limp which resulted in lower back pain.  She has tried anti-inflammatories, exercises, and injections without relief.  She reports no new concerns.    Review of Systems     Constitutional: Denies fevers, chills, weight loss  Cardiovascular: Denies chest pain, shortness of breath  Skin: Denies rashes, acute skin changes  Neurologic: Denies headache, loss of consciousness  MSK: Right hip pain    Personal History     Past Medical History:   Diagnosis Date   • Acid reflux    • Anxiety    • Arthritis    • Asthma     HAS INHALERS SCHEDULED AND PRN   • Depression    • Diabetes mellitus     [facility] 'S   • H/O precordial chest pain     FOLLOWED BY DR HILL. DENIES CP/SOA. REPORTS WORKS FULL TIME IN [place].   • Hyperlipidemia    • Hypertension    • Injury of back    • Migraines    • Pain management     [facility]   • Sleep apnea     REPORTS IS SUPPOSED TO BE RECEIVING CPAP       Past Surgical History:   Procedure Laterality Date   • CARDIAC CATHETERIZATION N/A 2021    Procedure: Left Heart Cath - R radial;  Surgeon: NNAMDI Hill MD;  Location: [facility] INVASIVE LOCATION;  Service: Cardiology;  Laterality: N/A;   • CARDIAC SURGERY      \"repair of hole in heart\" HAD REPAIR OF ATRIAL-SEPTAL DEFECT IN    • HYSTERECTOMY     • LAPAROSCOPIC TUBAL LIGATION         Family History: family history includes Diabetes in her " father and mother; Hypertension in her father and mother. Otherwise pertinent FHx was reviewed and not pertinent to current issue.    Social History:  reports that she has never smoked. She has never used smokeless tobacco. She reports that she does not drink alcohol and does not use drugs.    Home Medications:  ARIPiprazole, Beclomethasone Diprop HFA, Calcium Carb-Cholecalciferol, QUEtiapine, Vitamin D3, Vitamin E, albuterol sulfate HFA, amantadine, amitriptyline, atorvastatin, budesonide-formoterol, carvedilol, diclofenac, empagliflozin, escitalopram, famotidine, gabapentin, melatonin, metFORMIN, methocarbamol, montelukast, oxyCODONE-acetaminophen, potassium chloride, and prazosin    Allergies:  No Known Allergies    Objective    Objective     Vitals:   Temp:  [97.2 °F (36.2 °C)] 97.2 °F (36.2 °C)  Heart Rate:  [94] 94  Resp:  [18] 18  BP: (141)/(81) 141/81    Physical Exam    General: Alert. No acute distress.   Cardiac: Intact peripheral pulses  Pulmonary: Unlabored respirations on room air.  Right lower extremity: Pain with passive hip range of motion.  Hip flexion intact.  5 out of 5 hip abduction with associated pain.  Knee extensor mechanism intact.  Full knee extension.  Knee flexion 120.  Patellar crepitus with knee range of motion.  Knee stable to varus and valgus stress.  Calf soft, nontender.  Demonstrates active ankle plantarflexion and dorsiflexion.  Sensation intact over the dorsal and plantar foot.  Palpable pedal pulses.    Result Review    Result Review:  I have personally reviewed the results from the time of this admission to 5/11/2023 06:16 EDT and agree with these findings:  [x]  Laboratory list / accordion  []  Microbiology  [x]  Radiology  []  EKG/Telemetry   []  Cardiology/Vascular   []  Pathology  []  Old records  []  Other:      Assessment & Plan   Assessment / Plan     Brief Patient Summary:  Casi Lopez is a 48 y.o. female who has right hip arthritis.  She presents today for  surgical management with right total hip arthroplasty.    Active Hospital Problems:  Active Hospital Problems    Diagnosis    • **Arthritis of right hip      Plan:     Casi presents today for her advanced right hip osteoarthritis. Discussed the treatment options with the patient, operative vs non-operative. Discussed the risks and benefits of a Right Total Hip Arthroplasty. Discussed surgery. Risks/benefits discussed with patient including, but not limited to: infection, bleeding, neurovascular damage, malunion, nonunion, aesthetic deformity, leg length discrepancy, loosening, fracture, instability, functional limitations, anesthesia risk, need for further surgery, and death. Discussed with patient the implant type being used during surgery and patient understands. Patient expressed understanding elected to proceed with surgical management for right total hip arthroplasty.        DVT prophylaxis:  Mechanical DVT prophylaxis orders are present.      Luci Guaman PA-C      I have seen and examined the above patient and agree with the plan.     Cardiac: Intact peripheral pulses  Pulmonary: Nonlabored respirations on room air.   RLE: Skin intact. Distal neurovascular intact.     We discussed the risks, benefits, indications, and alternatives to right total hip arthroplasty as noted above. The patient elected to proceed and consent was obtained.     Electronically signed by Elias Gruber MD, 05/11/23, 6:46 AM EDT.

## 2023-05-11 NOTE — OP NOTE
TOTAL HIP ARTHROPLASTY ANTERIOR  Procedure Report    Patient Name:  Casi Lopez  YOB: 1975    Date of Surgery:  5/11/2023     Indications: Casi is a 48-year-old female with advanced right hip osteoarthritis.  We discussed treatment options.  We discussed additional nonoperative management.  We discussed surgical management with right total hip arthroplasty.  We discussed surgical benefits including pain relief and improved function.  We discussed surgical risks including bleeding, infection, damage to nerves or blood vessels, hardware complications, fracture, dislocation, leg length discrepancy, loosening, persistent pain, anesthesia risk including mortality, DVT, and need for additional procedures.  The patient elected to proceed and consent was obtained.    Pre-op Diagnosis:   Primary osteoarthritis of right hip [M16.11]       Post-Op Diagnosis Codes:     * Primary osteoarthritis of right hip [M16.11]    Procedure/CPT® Codes:      Procedure(s):  RIGHT TOTAL HIP ARTHROPLASTY ANTERIOR    Surgical Approach: Hip Direct Anterior (Lanier-Craft)        Staff:  Surgeon(s):  Elias Gruber MD    Assistant: Luci Guaman PA-C; Lisa Prescott    Anesthesia: General    Estimated Blood Loss: 200 mL    Implants:    Implant Name Type Inv. Item Serial No.  Lot No. LRB No. Used Action   SUT NONABS MAXBRAID NMBR5 K60 38IN WHT/SHERLYN - [id number] Implant SUT NONABS MAXBRAID NMBR5 K60 38IN WHT/SHERLYN  [employer] [id number] Right 2 Implanted   SHLL ACET G7 PPS LTD/HL TI SZB 46MM - [id number] Implant SHLL ACET G7 PPS LTD/HL TI SZB 46MM  [employer] [id number] Right 1 Implanted   LINER ACET G7 VIVACIT/E NTRL HXPE SZB 32MM - [id number] Implant LINER ACET G7 VIVACIT/E NTRL HXPE SZB 32MM  [employer] [id number] Right 1 Implanted   SCRW ACET SAMEERA TRILOGY S/TAP 6.5X25 - [id number] Implant SCRW ACET SAMEERA TRILOGY S/TAP 6.5X25  [employer] [id number] Right 1 Implanted   STEM FEM/HIP AVENIR/COMPLETE  HI/OFFST HA SZ1 - [id number] Implant STEM FEM/HIP AVENIR/COMPLETE HI/OFFST HA SZ1  [employer] [id number] Right 1 Implanted   HD FEM/HIP BIOLOX/DELTA CERAM 12/40I79FO MIN3.5MM - [id number] Implant HD FEM/HIP BIOLOX/DELTA CERAM 12/[id number] MIN3.5MM  [employer] [id number] Right 1 Implanted       Specimen:          None        Findings: Right hip osteoarthritis    Complications: None    Description of Procedure: The patient was met in the preoperative holding area and the operative extremity was marked.  The patient was transported to the operating room and general anesthesia was induced without complication.  The patient was then carefully transferred onto the [place] table with both legs well-padded and placed into the traction boots.  2 g of preoperative Ancef was administered.  1 g of preoperative tranexamic acid was administered.  The right hip was then prepped and draped in the usual sterile fashion.  A timeout was taken to ensure the appropriate patient, procedure, and procedural site.  All were in agreement.  I made a 10 cm longitudinal incision beginning 2 cm distal and lateral to the ASIS and extending toward the fibular head.  Sharp dissection was carried down through the skin and subcutaneous tissues.  Hemostasis was obtained with Bovie electrocautery.  The fascia over the TFL was identified.  This was split sharply in line with the incision.  The fascia was elevated.  The TFL was mobilized.  A Cobra retractor was placed over the superior aspect of the femoral neck.  A Hubbard elevator was used to develop the interval between the rectus and the anterior aspect of the femur.  A Cobra retractor was then placed over the inferior aspect of the femoral neck.  At the distal aspect of the interval I identified the circumflex femoral vessels.  These were cauterized.  I then performed a T-shaped capsulotomy.  The anterior and posterior limbs of the capsule were tagged with nonabsorbable sutures.  The Cobra retractors were  placed intracapsular.  I then planned and performed a femoral neck cut using C arm assistance.  This was done without complication.  The cut was completed laterally with an osteotome.  Traction was applied to the leg through the Holland table.  I utilized a corkscrew on power to remove the femoral head without complication. Acetabular retractors were positioned over the anterior and posterior wall without complication.  I sharply excised the labrum from the periphery of the acetabulum.  The fatty tissue in the fossa was removed with a rongeur. I began reaming under direct visualization.  The head was sized at a 46.  I started with a 43 reamer.  I medialized down to the medial wall.  I then reamed under C-arm guidance sequentially up to a size 45 reamer.  This had adequate purchase.  I was satisfied with the abduction and anteversion radiographically.  Adequate bleeding bone was present under direct visualization and the anterior and posterior walls were intact.  I irrigated the acetabulum.  I then impacted a Elza G7 46 mm cup, matching my previous reamer positioning.  This was done without complication.  The cup had adequate purchase.  The cup was irrigated and was fully seated by direct visualization.  I then used C-arm guidance to drill, measure, and place 1 acetabular screw of size 25 mm in length.  This had excellent purchase.  The cup was again irrigated.  The neutral acetabular liner was then impacted without complication and was fully seated.  Acetabular retractors were then removed.  The femoral hook for the [place] table attachment was then inserted.  All traction was removed from the leg.  The leg was dropped into extension, external rotation, and adduction.  I then performed a capsular release and released soft tissue over the greater trochanter including the piriformis.  I had adequate visualization of the proximal femur.  I used a rongeur to remove the femoral neck remnant and lateralize proximally.  I then  identified the femoral canal utilizing the curved rasp.  I used this to lateralize as well.  I then began broaching.  I started with the starting broach and sequentially broached up to a size 1 for the Avenir Complete system which had excellent purchase.  I calcar planed the neck at this level.  I inserted a -3.5 neck trial with a 32 mm head.  Retractors removed and the hip was reduced.  This was stable in extension and external rotation at 90 degrees.  C-arm fluoroscopy was brought in.  Femoral component sizing was appropriate and no periprosthetic fractures were noted.  I was satisfied with the leg lengths radiographically.  I elected for a high offset stem, but I was overall satisfied with these trial components.  The hip was dislocated without complication.  The leg was repositioned and retractors were inserted.  Trial components were removed.  I irrigated the proximal femur with normal saline.  I then impacted the size 1 Avenir Complete stem without complication, matching my previous version.  The trunnion and acetabulum were thoroughly irrigated.  The acetabulum was clear.  I impacted the size 32 mm head with a -3.5 neck without complication.  This was reduced.  The hip was again stable in extension and external rotation at 90 degrees.  Final images were obtained with C arm.  I was satisfied with component positioning.  No complications were noted.  The hip was thoroughly irrigated with Irrisept followed by normal saline.  Adequate hemostasis was obtained.  The capsule was closed with the nonabsorbable tag sutures.  Fascia over the TFL was closed with 0 Vicryl in running fashion.  An additional 1 g of tranexamic acid was administered.  Periarticular local anesthetic was injected.  Subcutaneous tissues were closed with 0 Vicryl and 2-0 Vicryl.  Skin was closed with skin staples.  The patient was carefully transported off the [place] table back onto her hospital bed.  Leg lengths and rotation were symmetric.  The  patient had palpable pedal pulses.  Patient woke from anesthesia without complication and was transferred to the recovery room in stable condition.  All surgical counts were correct.    Assistant: Luci Guaman PA-C; Lisa Prescott  was responsible for performing the following activities: Retraction, Suction, Irrigation, Suturing, Closing and Placing Dressing and their skilled assistance was necessary for the success of this case.    Elias Gruber MD     Date: 5/11/2023  Time: 09:35 EDT

## 2023-05-12 VITALS
SYSTOLIC BLOOD PRESSURE: 128 MMHG | DIASTOLIC BLOOD PRESSURE: 67 MMHG | OXYGEN SATURATION: 98 % | WEIGHT: 183.2 LBS | BODY MASS INDEX: 28.75 KG/M2 | HEART RATE: 99 BPM | RESPIRATION RATE: 17 BRPM | HEIGHT: 67 IN | TEMPERATURE: 99.4 F

## 2023-05-12 LAB
ANION GAP SERPL CALCULATED.3IONS-SCNC: 9.9 MMOL/L (ref 5–15)
BUN SERPL-MCNC: 14 MG/DL (ref 6–20)
BUN/CREAT SERPL: 17.7 (ref 7–25)
CALCIUM SPEC-SCNC: 8.5 MG/DL (ref 8.6–10.5)
CHLORIDE SERPL-SCNC: 101 MMOL/L (ref 98–107)
CO2 SERPL-SCNC: 26.1 MMOL/L (ref 22–29)
CREAT SERPL-MCNC: 0.79 MG/DL (ref 0.57–1)
EGFRCR SERPLBLD CKD-EPI 2021: 91.8 ML/MIN/1.73
GLUCOSE BLDC GLUCOMTR-MCNC: 148 MG/DL (ref 70–99)
GLUCOSE BLDC GLUCOMTR-MCNC: 154 MG/DL (ref 70–99)
GLUCOSE SERPL-MCNC: 158 MG/DL (ref 65–99)
HCT VFR BLD AUTO: 30.5 % (ref 34–46.6)
HGB BLD-MCNC: 9.8 G/DL (ref 12–15.9)
MAGNESIUM SERPL-MCNC: 1.5 MG/DL (ref 1.6–2.6)
POTASSIUM SERPL-SCNC: 2.9 MMOL/L (ref 3.5–5.2)
SODIUM SERPL-SCNC: 137 MMOL/L (ref 136–145)

## 2023-05-12 PROCEDURE — 94799 UNLISTED PULMONARY SVC/PX: CPT

## 2023-05-12 PROCEDURE — 80048 BASIC METABOLIC PNL TOTAL CA: CPT | Performed by: STUDENT IN AN ORGANIZED HEALTH CARE EDUCATION/TRAINING PROGRAM

## 2023-05-12 PROCEDURE — 82948 REAGENT STRIP/BLOOD GLUCOSE: CPT

## 2023-05-12 PROCEDURE — 83735 ASSAY OF MAGNESIUM: CPT | Performed by: FAMILY MEDICINE

## 2023-05-12 PROCEDURE — 97110 THERAPEUTIC EXERCISES: CPT

## 2023-05-12 PROCEDURE — 85018 HEMOGLOBIN: CPT | Performed by: STUDENT IN AN ORGANIZED HEALTH CARE EDUCATION/TRAINING PROGRAM

## 2023-05-12 PROCEDURE — 94664 DEMO&/EVAL PT USE INHALER: CPT

## 2023-05-12 PROCEDURE — 99024 POSTOP FOLLOW-UP VISIT: CPT | Performed by: STUDENT IN AN ORGANIZED HEALTH CARE EDUCATION/TRAINING PROGRAM

## 2023-05-12 PROCEDURE — 97535 SELF CARE MNGMENT TRAINING: CPT

## 2023-05-12 PROCEDURE — 97530 THERAPEUTIC ACTIVITIES: CPT

## 2023-05-12 PROCEDURE — 97165 OT EVAL LOW COMPLEX 30 MIN: CPT

## 2023-05-12 PROCEDURE — 97116 GAIT TRAINING THERAPY: CPT

## 2023-05-12 PROCEDURE — 85014 HEMATOCRIT: CPT | Performed by: STUDENT IN AN ORGANIZED HEALTH CARE EDUCATION/TRAINING PROGRAM

## 2023-05-12 PROCEDURE — 25010000002 MAGNESIUM SULFATE 2 GM/50ML SOLUTION: Performed by: FAMILY MEDICINE

## 2023-05-12 RX ORDER — ACETAMINOPHEN 500 MG
1000 TABLET ORAL EVERY 8 HOURS
Qty: 60 TABLET | Refills: 0 | Status: SHIPPED | OUTPATIENT
Start: 2023-05-12 | End: 2023-05-22

## 2023-05-12 RX ORDER — ASPIRIN 325 MG
325 TABLET ORAL EVERY 12 HOURS SCHEDULED
Qty: 60 TABLET | Refills: 0 | Status: SHIPPED | OUTPATIENT
Start: 2023-05-12 | End: 2023-06-11

## 2023-05-12 RX ORDER — OXYCODONE HYDROCHLORIDE 5 MG/1
5 TABLET ORAL EVERY 4 HOURS PRN
Qty: 40 TABLET | Refills: 0 | Status: SHIPPED | OUTPATIENT
Start: 2023-05-12 | End: 2023-05-19

## 2023-05-12 RX ORDER — MAGNESIUM SULFATE HEPTAHYDRATE 40 MG/ML
2 INJECTION, SOLUTION INTRAVENOUS ONCE
Status: COMPLETED | OUTPATIENT
Start: 2023-05-12 | End: 2023-05-12

## 2023-05-12 RX ORDER — POTASSIUM CHLORIDE 750 MG/1
40 CAPSULE, EXTENDED RELEASE ORAL
Status: DISCONTINUED | OUTPATIENT
Start: 2023-05-12 | End: 2023-05-12 | Stop reason: HOSPADM

## 2023-05-12 RX ORDER — AMOXICILLIN 250 MG
2 CAPSULE ORAL 2 TIMES DAILY
Qty: 20 TABLET | Refills: 0 | Status: SHIPPED | OUTPATIENT
Start: 2023-05-12

## 2023-05-12 RX ADMIN — OXYCODONE HYDROCHLORIDE 10 MG: 5 TABLET ORAL at 02:04

## 2023-05-12 RX ADMIN — ASPIRIN 325 MG: 325 TABLET ORAL at 08:27

## 2023-05-12 RX ADMIN — DOCUSATE SODIUM 50MG AND SENNOSIDES 8.6MG 2 TABLET: 8.6; 5 TABLET, FILM COATED ORAL at 08:28

## 2023-05-12 RX ADMIN — FAMOTIDINE 40 MG: 20 TABLET ORAL at 08:27

## 2023-05-12 RX ADMIN — GABAPENTIN 600 MG: 300 CAPSULE ORAL at 05:54

## 2023-05-12 RX ADMIN — AMANTADINE HYDROCHLORIDE 100 MG: 100 CAPSULE ORAL at 08:28

## 2023-05-12 RX ADMIN — OXYCODONE HYDROCHLORIDE 10 MG: 5 TABLET ORAL at 08:27

## 2023-05-12 RX ADMIN — POTASSIUM CHLORIDE 40 MEQ: 10 CAPSULE, COATED, EXTENDED RELEASE ORAL at 10:37

## 2023-05-12 RX ADMIN — MAGNESIUM SULFATE HEPTAHYDRATE 2 G: 2 INJECTION, SOLUTION INTRAVENOUS at 13:12

## 2023-05-12 RX ADMIN — CARVEDILOL 6.25 MG: 6.25 TABLET, FILM COATED ORAL at 08:27

## 2023-05-12 RX ADMIN — ACETAMINOPHEN 1000 MG: 500 TABLET ORAL at 03:29

## 2023-05-12 RX ADMIN — BUDESONIDE AND FORMOTEROL FUMARATE DIHYDRATE 2 PUFF: 160; 4.5 AEROSOL RESPIRATORY (INHALATION) at 07:29

## 2023-05-12 RX ADMIN — ARIPIPRAZOLE 10 MG: 10 TABLET ORAL at 08:27

## 2023-05-12 RX ADMIN — FERROUS SULFATE TAB 325 MG (65 MG ELEMENTAL FE) 325 MG: 325 (65 FE) TAB at 08:28

## 2023-05-12 RX ADMIN — GABAPENTIN 600 MG: 300 CAPSULE ORAL at 13:19

## 2023-05-12 RX ADMIN — ESCITALOPRAM 10 MG: 10 TABLET, FILM COATED ORAL at 08:27

## 2023-05-12 NOTE — PROGRESS NOTES
[facility]     Progress Note    Patient Name: Casi Lopez  : 1975  MRN: [id number]  Primary Care Physician:  Adore Quispe APRN  Date of admission: 2023    Subjective   Subjective     HPI:  Pain well controlled.  Up and ambulatory with PT.  Voiding without difficulty.  Denies chest pain or shortness of breath.    Review of Systems   See HPI    Objective   Objective     Vitals:   Temp:  [97.3 °F (36.3 °C)-98.9 °F (37.2 °C)] 98.3 °F (36.8 °C)  Heart Rate:  [] 91  Resp:  [12-23] 22  BP: ()/(58-95) 134/58  Flow (L/min):  [0-4] 0  Physical Exam    General: Alert, no acute distress   Cardiac: Intact peripheral pulses   Pulmonary: Nonlabored respiration   Right lower extremity: Aquacel in place.  Minimal blood spotting.  Thigh mildly swollen but soft.  Calf soft.  Distal neurovascular intact.  Palpable pedal pulse.    Result Review      Hemoglobin   Date Value Ref Range Status   2023 9.8 (L) 12.0 - 15.9 g/dL Final     Hematocrit   Date Value Ref Range Status   2023 30.5 (L) 34.0 - 46.6 % Final        Result Review:  I have personally reviewed the results from the time of this admission to 2023 07:23 EDT and agree with these findings:  [x]  Laboratory  []  Microbiology  [x]  Radiology  []  EKG/Telemetry   []  Cardiology/Vascular   []  Pathology  []  Old records  []  Other:      Assessment & Plan   Assessment / Plan     Brief Patient Summary:  Casi Lopez is a 48 y.o. female status post right total hip arthroplasty on     Active Hospital Problems:  Active Hospital Problems    Diagnosis    • **S/P total right hip arthroplasty    • Hypertension    • T2DM (type 2 diabetes mellitus)    • Hyperlipidemia    • Asthma    • DARYL on CPAP    • Depression    • Anxiety    • Arthritis of right hip      Plan:   Hemoglobin 9.8-monitor  Pain control  Postoperative antibiotics: Ancef  DVT prophylaxis: Aspirin  Weight-bear as tolerated right lower extremity  Anterior  precautions  PT/OT  Further care per primary team, appreciate assistance    Dispo: Stable for discharge from orthopedic standpoint, 2-week follow-up for reevaluation       DVT prophylaxis:  Mechanical DVT prophylaxis orders are present.    CODE STATUS:   Code Status (Patient has no pulse and is not breathing): CPR (Attempt to Resuscitate)  Medical Interventions (Patient has pulse or is breathing): Full Support  Release to patient: Routine Release      Electronically signed by Elias Gruber MD, 05/12/23, 7:23 AM EDT.

## 2023-05-12 NOTE — THERAPY TREATMENT NOTE
"Acute Care - Physical Therapy Treatment Note   [place]     Patient Name: Casi Lopez  : 1975  MRN: [id number]  Today's Date: 2023      Visit Dx:     ICD-10-CM ICD-9-CM   1. Difficulty walking  R26.2 719.7   2. Primary osteoarthritis of right hip  M16.11 715.15   3. S/P total right hip arthroplasty  Z96.641 V43.64   4. Decreased activities of daily living (ADL)  Z78.9 V49.89     Patient Active Problem List   Diagnosis   • DARYL (obstructive sleep apnea)   • Snoring   • Moderate asthma   • Angina pectoris   • Arthritis of right hip   • Hypertension   • T2DM (type 2 diabetes mellitus)   • Hyperlipidemia   • Asthma   • S/P total right hip arthroplasty   • DARYL on CPAP   • Depression   • Anxiety     Past Medical History:   Diagnosis Date   • Acid reflux    • Anxiety    • Arthritis    • Asthma     HAS INHALERS SCHEDULED AND PRN   • Depression    • Diabetes mellitus     [facility]   • H/O precordial chest pain     FOLLOWED BY DR HILL. DENIES CP/SOA. REPORTS WORKS FULL TIME IN [employer].   • Hyperlipidemia    • Hypertension    • Injury of back    • Migraines    • Pain management     [facility]   • Sleep apnea     REPORTS IS SUPPOSED TO BE RECEIVING CPAP     Past Surgical History:   Procedure Laterality Date   • CARDIAC CATHETERIZATION N/A 2021    Procedure: Left Heart Cath - R radial;  Surgeon: NNAMDI Hill MD;  Location: [facility] INVASIVE LOCATION;  Service: Cardiology;  Laterality: N/A;   • CARDIAC SURGERY      \"repair of hole in heart\" HAD REPAIR OF ATRIAL-SEPTAL DEFECT IN    • HYSTERECTOMY     • LAPAROSCOPIC TUBAL LIGATION     • TOTAL HIP ARTHROPLASTY Right 2023    Procedure: RIGHT TOTAL HIP ARTHROPLASTY ANTERIOR;  Surgeon: Elias Gruber MD;  Location: [facility] OR;  Service: Orthopedics;  Laterality: Right;     PT Assessment (last 12 hours)     PT Evaluation and Treatment     Row Name 23 1403 23 1308       Physical Therapy Time and Intention    Subjective " Information no complaints  -RH no complaints  -RH    Document Type therapy note (daily note)  -RH therapy note (daily note)  -    Mode of Treatment physical therapy;individual therapy  -RH physical therapy;individual therapy  -RH    Patient Effort good  -RH fair  -    Row Name 05/12/23 1308          Pain    Additional Documentation Pain Scale: FACES Pre/Post-Treatment (Group)  -     Row Name 05/12/23 1403 05/12/23 1308       Pain Scale: FACES Pre/Post-Treatment    Pain: FACES Scale, Pretreatment 2-->hurts little bit  -RH 2-->hurts little bit  -RH    Posttreatment Pain Rating 2-->hurts little bit  -RH 2-->hurts little bit  -RH    Pain Location - Side/Orientation Right  -RH Right  -    Pain Location - hip  -RH hip  -    Row Name 05/12/23 1308          Range of Motion (ROM)    Range of Motion --  Pt R hip AAROM at 90 degrees flex and 20 degrees abd.  -     Row Name 05/12/23 1308          Strength (Manual Muscle Testing)    Strength (Manual Muscle Testing) --  Pt R hip flex strength at 3-/5.  -     Row Name 05/12/23 1403 05/12/23 1308       Transfers    Transfers sit-stand transfer;stand-sit transfer  - sit-stand transfer;stand-sit transfer  -    Maintains Weight-bearing Status (Transfers) -- able to maintain  -[facility] Name 05/12/23 1403 05/12/23 1308       Sit-Stand Transfer    Sit-Stand Cloud (Transfers) contact guard  - contact guard  -    Assistive Device (Sit-Stand Transfers) walker, front-wheeled  - walker, front-wheeled  -    Row Name 05/12/23 1403 05/12/23 1308       Stand-Sit Transfer    Stand-Sit Cloud (Transfers) contact guard  - contact guard  -    Assistive Device (Stand-Sit Transfers) walker, front-wheeled  - walker, front-wheeled  -    Row Name 05/12/23 1403 05/12/23 1308       Gait/Stairs (Locomotion)    Gait/Stairs Locomotion gait/ambulation independence;gait/ambulation assistive device;distance ambulated;gait pattern;gait deviations  - gait/ambulation  independence;gait/ambulation assistive device;distance ambulated;gait pattern;gait deviations  -RH    [place] Level (Gait) standby assist  -RH contact guard  -RH    Assistive Device (Gait) walker, front-wheeled  -RH walker, front-wheeled  -RH    Distance in Feet (Gait) 125  -  -RH    Pattern (Gait) 3-point;step-through  -RH 3-point;step-through  -RH    Deviations/Abnormal Patterns (Gait) antalgic;gait speed decreased;stride length decreased  -RH antalgic;gait speed decreased;stride length decreased  -RH    Gait Assessment/Intervention Pt amb with RW and SBA with 3 point step-through gait pattern with pt rushing advancement of LLE to minimize RLE weight-bearing.  -RH Pt amb with RW with CGA with antalgic gait and 3 point step-through gait pattern.  -RH    Negotiation (Stairs) -- stairs independence;stairs assistive device;handrail location;number of steps;ascending technique;descending technique  -    [place] Level (Stairs) -- contact guard  -RH    Handrail Location (Stairs) -- both sides  -RH    Number of Steps (Stairs) -- 5 x 2  -RH    Ascending Technique (Stairs) -- step-to-step  -RH    Descending Technique (Stairs) -- step-to-step  -RH    Row Name 05/12/23 1403 05/12/23 1308       Balance    Dynamic Standing Balance standby assist  -RH contact guard  -RH    Position/Device Used, Standing Balance walker, front-wheeled  -RH walker, front-wheeled  -RH    Row Name             Wound 05/11/23 0800 Right anterior hip Incision    Wound - Properties Group Placement Date: 05/11/23  -SC Placement Time: 0800  -SC Present on Hospital Admission: N  -SC Side: Right  -SC Orientation: anterior  -SC Location: hip  -SC Primary Wound Type: Incision  -SC    Retired Wound - Properties Group Placement Date: 05/11/23  -SC Placement Time: 0800  -SC Present on Hospital Admission: N  -SC Side: Right  -SC Orientation: anterior  -SC Location: hip  -SC Primary Wound Type: Incision  -SC    Retired Wound - Properties Group  Date first assessed: 05/11/23  -SC Time first assessed: 0800  -SC Present on Hospital Admission: N  -SC Side: Right  -SC Location: hip  -SC Primary Wound Type: Incision  -SC    Row Name 05/12/23 1403 05/12/23 1308       Progress Summary (PT)    Progress Toward Functional Goals (PT) progress toward functional goals is good  -RH progress toward functional goals is fair  -RH          User Key  (r) = Recorded By, (t) = Taken By, (c) = Cosigned By    Initials Name Provider Type    SC Janna Belle RN Registered Nurse    RH Roge Angel PTA Physical Therapist Assistant            Right Hip Ther -ex   Exercise  Reps  Sets    Long arc quads   10 2   Short arc quads  10 2   Heel slides  10 2   Ankle pumps  10 2   Quad sets  10 2   Glut sets  10 2   Abduction/ Adduction  10 2            PT Recommendation and Plan     Progress Summary (PT)  Progress Toward Functional Goals (PT): progress toward functional goals is good   Outcome Measures     Row Name 05/12/23 1300 05/11/23 1200          How much help from another person do you currently need...    Turning from your back to your side while in flat bed without using bedrails? 4  -RH 3  -DP     Moving from lying on back to sitting on the side of a flat bed without bedrails? 4  -RH 2  -DP     Moving to and from a bed to a chair (including a wheelchair)? 3  -RH 2  -DP     Standing up from a chair using your arms (e.g., wheelchair, bedside chair)? 4  -RH 3  -DP     Climbing 3-5 steps with a railing? 4  -RH 3  -DP     To walk in hospital room? 4  -RH 3  -DP     AM-PAC 6 Clicks Score (PT) 23  -RH 16  -DP        Functional Assessment    Outcome Measure Options -- AM-PAC 6 Clicks Basic Mobility (PT)  -DP           User Key  (r) = Recorded By, (t) = Taken By, (c) = Cosigned By    Initials Name Provider Type    Roge Lucero PTA Physical Therapist Assistant    Mindi Alcaraz, PT Physical Therapist                 Time Calculation:    PT Charges     Row Name 05/12/23 3057  05/12/23 1307          Time Calculation    PT Received On 05/12/23  -RH 05/12/23  -RH        Timed Charges    55688 - PT Therapeutic Exercise Minutes 16  -RH 18  -RH     35716 - Gait Training Minutes  5  -RH 5  -RH     55414 - PT Therapeutic Activity Minutes 3  -RH 4  -RH        Total Minutes    Timed Charges Total Minutes 24  -RH 27  -RH      Total Minutes 24  -RH 27  -RH           User Key  (r) = Recorded By, (t) = Taken By, (c) = Cosigned By    Initials Name Provider Type     Roge Angel PTA Physical Therapist Assistant              Therapy Charges for Today     Code Description Service Date Service Provider Modifiers Qty    38226980206 HC PT THER PROC EA 15 MIN 5/12/2023 Roge Angel PTA GP 1    04925206608 HC GAIT TRAINING EA 15 MIN 5/12/2023 Roge Angel PTA GP 1    83570740030 HC GAIT TRAINING EA 15 MIN 5/12/2023 Roge Angel PTA GP 1    94405082238 HC PT THER PROC EA 15 MIN 5/12/2023 Roge Angel PTA GP 1          PT G-Codes  Outcome Measure Options: AM-PAC 6 Clicks Daily Activity (OT), Optimal Instrument  AM-PAC 6 Clicks Score (PT): 23  AM-PAC 6 Clicks Score (OT): 19    Roge Angel PTA  5/12/2023

## 2023-05-12 NOTE — PLAN OF CARE
Goal Outcome Evaluation:  Plan of Care Reviewed With: patient        Progress: improving  Outcome Evaluation: Patient instructed in safe transfer technique with education provided in home safety/fall prevention strategies.  Patient has orders to discharge home today with outpatient physical therapy scheduled.  No additional occupational therapy recommended at this time

## 2023-05-12 NOTE — THERAPY EVALUATION
"Patient Name: Casi Lopez  : 1975    MRN: [id number]                              Today's Date: 2023       Admit Date: 2023    Visit Dx:     ICD-10-CM ICD-9-CM   1. Difficulty walking  R26.2 719.7   2. Primary osteoarthritis of right hip  M16.11 715.15   3. S/P total right hip arthroplasty  Z96.641 V43.64   4. Decreased activities of daily living (ADL)  Z78.9 V49.89     Patient Active Problem List   Diagnosis   • DARYL (obstructive sleep apnea)   • Snoring   • Moderate asthma   • Angina pectoris   • Arthritis of right hip   • Hypertension   • T2DM (type 2 diabetes mellitus)   • Hyperlipidemia   • Asthma   • S/P total right hip arthroplasty   • DARYL on CPAP   • Depression   • Anxiety     Past Medical History:   Diagnosis Date   • Acid reflux    • Anxiety    • Arthritis    • Asthma     HAS INHALERS SCHEDULED AND PRN   • Depression    • Diabetes mellitus     AVERAGE AM 'S   • H/O precordial chest pain     FOLLOWED BY DR HILL. DENIES CP/SOA. REPORTS WORKS FULL TIME IN [employer].   • Hyperlipidemia    • Hypertension    • Injury of back    • Migraines    • Pain management     [facility]   • Sleep apnea     REPORTS IS SUPPOSED TO BE RECEIVING CPAP     Past Surgical History:   Procedure Laterality Date   • CARDIAC CATHETERIZATION N/A 2021    Procedure: Left Heart Cath - R radial;  Surgeon: NNAMDI Hill MD;  Location: [facility] INVASIVE LOCATION;  Service: Cardiology;  Laterality: N/A;   • CARDIAC SURGERY      \"repair of hole in heart\" HAD REPAIR OF ATRIAL-SEPTAL DEFECT IN    • HYSTERECTOMY     • LAPAROSCOPIC TUBAL LIGATION     • TOTAL HIP ARTHROPLASTY Right 2023    Procedure: RIGHT TOTAL HIP ARTHROPLASTY ANTERIOR;  Surgeon: Elias Gruber MD;  Location: [facility] OR;  Service: Orthopedics;  Laterality: Right;      General Information     Row Name 23 0904 23 0856       OT Time and Intention    Document Type therapy note (daily note)  -PG evaluation  -PG    Mode of " Treatment individual therapy;occupational therapy  -PG individual therapy;occupational therapy  -PG    Row Name 05/12/23 0856          General Information    Prior Level of Function independent:;transfer;ADL's  -PG     Existing Precautions/Restrictions fall  -PG     Barriers to Rehab none identified  -PG     Row Name 05/12/23 0856          Occupational Profile    Reason for Services/Referral (Occupational Profile) Patient is a pleasant 48-year-old female admitted for a right total hip replacement.  No previous OT services identified.  Patient is being evaluated by Occupational Therapy due to recent decline in ADL function  -PG     Row Name 05/12/23 0856          Living Environment    People in Home child(jeison), adult  -PG     Row Name 05/12/23 0856          Home Main Entrance    Number of Stairs, Main Entrance three  -PG     Row Name 05/12/23 0856          Stairs Within Home, Primary    Number of Stairs, Within Home, Primary one  -PG     Row Name 05/12/23 0856          Cognition    Orientation Status (Cognition) oriented x 4  -PG     Row Name 05/12/23 0856          Safety Issues, Functional Mobility    Impairments Affecting Function (Mobility) range of motion (ROM);strength;pain  -PG           User Key  (r) = Recorded By, (t) = Taken By, (c) = Cosigned By    Initials Name Provider Type    PG Thiago Ng, OT Occupational Therapist                 Mobility/ADL's     Row Name 05/12/23 0904 05/12/23 0857       Transfers    Transfers bed-chair transfer;sit-stand transfer;stand-sit transfer  -PG bed-chair transfer;sit-stand transfer;stand-sit transfer  -PG    Row Name 05/12/23 0904 05/12/23 0857       Bed-Chair Transfer    Bed-Chair [place] (Transfers) standby assist;verbal cues;contact guard  -PG standby assist;verbal cues;contact guard  -PG    Assistive Device (Bed-Chair Transfers) walker, front-wheeled  -PG walker, front-wheeled  -PG    Row Name 05/12/23 0904 05/12/23 0857       Sit-Stand Transfer    Sit-Stand  [place] (Transfers) standby assist;contact guard;verbal cues  -PG standby assist;contact guard;verbal cues  -PG    Row Name 05/12/23 0904 05/12/23 0857       Stand-Sit Transfer    Stand-Sit [place] (Transfers) contact guard;standby assist;verbal cues  -PG contact guard;standby assist;verbal cues  -PG    Assistive Device (Stand-Sit Transfers) walker, front-wheeled  -PG walker, front-wheeled  -PG    Row Name 05/12/23 0857          Activities of Daily Living    BADL Assessment/Intervention bathing;upper body dressing;lower body dressing;grooming;toileting  -PG     Row Name 05/12/23 0904 05/12/23 0857       Bathing Assessment/Intervention    [place] Level (Bathing) bathing skills;minimum assist (75% patient effort);verbal cues  -PG bathing skills;minimum assist (75% patient effort)  -PG    Position (Bathing) supported standing  -PG supported standing  -PG    Row Name 05/12/23 0904 05/12/23 0857       Upper Body Dressing Assessment/Training    [place] Level (Upper Body Dressing) upper body dressing skills;set up  -PG upper body dressing skills;set up  -PG    Row Name 05/12/23 0904 05/12/23 0857       Lower Body Dressing Assessment/Training    [place] Level (Lower Body Dressing) lower body dressing skills;minimum assist (75% patient effort)  -PG lower body dressing skills;minimum assist (75% patient effort)  -PG    Position (Lower Body Dressing) supported standing  -PG supported standing  -PG    Row Name 05/12/23 0904 05/12/23 0857       Grooming Assessment/Training    [place] Level (Grooming) grooming skills;set up  -PG grooming skills;set up  -PG    Row Name 05/12/23 0904 05/12/23 0857       Toileting Assessment/Training    [place] Level (Toileting) toileting skills;standby assist;contact guard assist  -PG toileting skills;standby assist;contact guard assist  -PG    Position (Toileting) unsupported standing  -PG unsupported standing  -PG          User Key  (r) = Recorded By, (t) =  Taken By, (c) = Cosigned By    Initials Name Provider Type    PG Thiago Ng OT Occupational Therapist               Obj/Interventions     Row Name 05/12/23 0901          Sensory Assessment (Somatosensory)    Sensory Assessment (Somatosensory) sensation intact  -PG     Row Name 05/12/23 0901          Vision Assessment/Intervention    Visual Impairment/Limitations WFL  -PG     Row Name 05/12/23 0901          Range of Motion Comprehensive    General Range of Motion no range of motion deficits identified  -PG     Row Name 05/12/23 0901          Strength Comprehensive (MMT)    General Manual Muscle Testing (MMT) Assessment no strength deficits identified  -PG     Row Name 05/12/23 0901          Motor Skills    Motor Skills coordination;functional endurance  -PG     Coordination WFL  -PG     Functional Endurance Good minus  -PG           User Key  (r) = Recorded By, (t) = Taken By, (c) = Cosigned By    Initials Name Provider Type    PG Thiago Ng OT Occupational Therapist               Goals/Plan    No documentation.                Clinical Impression     Row Name 05/12/23 0901          Pain Assessment    Pretreatment Pain Rating 0/10 - no pain  -PG     Posttreatment Pain Rating 0/10 - no pain  -PG     Row Name 05/12/23 0901          Plan of Care Review    Plan of Care Reviewed With patient  -PG     Progress improving  -PG     Outcome Evaluation Patient instructed in safe transfer technique with education provided in home safety/fall prevention strategies.  Patient has orders to discharge home today with outpatient physical therapy scheduled.  No additional occupational therapy recommended at this time  -PG     [facility] Name 05/12/23 0901          Therapy Assessment/Plan (OT)    Patient/Family Therapy Goal Statement (OT) Walk without pain  -PG     Criteria for Skilled Therapeutic Interventions Met (OT) no;no problems identified which require skilled intervention  -PG     Therapy Frequency (OT) evaluation only  -PG      Row Name 05/12/23 0901          Therapy Plan Review/Discharge Plan (OT)    Anticipated Discharge Disposition (OT) home with outpatient therapy services  -PG           User Key  (r) = Recorded By, (t) = Taken By, (c) = Cosigned By    Initials Name Provider Type    PG Thiago Ng OT Occupational Therapist               Outcome Measures     Row Name 05/12/23 0902          How much help from another is currently needed...    Putting on and taking off regular lower body clothing? 3  -PG     Bathing (including washing, rinsing, and drying) 3  -PG     Toileting (which includes using toilet bed pan or urinal) 3  -PG     Putting on and taking off regular upper body clothing 3  -PG     Taking care of personal grooming (such as brushing teeth) 3  -PG     Eating meals 4  -PG     AM-PAC 6 Clicks Score (OT) 19  -PG     Row Name 05/12/23 0902          Functional Assessment    Outcome Measure Options AM-PAC 6 Clicks Daily Activity (OT);Optimal Instrument  -PG     Row Name 05/12/23 0902          Optimal Instrument    Optimal Instrument Optimal - 3  -PG     Bending/Stooping 2  -PG     Standing 2  -PG     Reaching 1  -PG     From the list, choose the 3 activities you would most like to be able to do without any difficulty Bending/stooping;Standing;Reaching  -PG     Total Score Optimal - 3 5  -PG           User Key  (r) = Recorded By, (t) = Taken By, (c) = Cosigned By    Initials Name Provider Type    PG Thiago Ng OT Occupational Therapist                Occupational Therapy Education     Title: PT OT SLP Therapies (Done)     Topic: Occupational Therapy (Done)     Point: ADL training (Done)     Description:   Instruct learner(s) on proper safety adaptation and remediation techniques during self care or transfers.   Instruct in proper use of assistive devices.              Learning Progress Summary           Patient Acceptance, E,D, DU by PG at 5/12/2023 0903                   Point: Home exercise program (Done)     Description:    Instruct learner(s) on appropriate technique for monitoring, assisting and/or progressing therapeutic exercises/activities.              Learning Progress Summary           Patient Acceptance, E,D, DU by PG at 5/12/2023 0903                   Point: Precautions (Done)     Description:   Instruct learner(s) on prescribed precautions during self-care and functional transfers.              Learning Progress Summary           Patient Acceptance, E,D, DU by PG at 5/12/2023 0903                   Point: Body mechanics (Done)     Description:   Instruct learner(s) on proper positioning and spine alignment during self-care, functional mobility activities and/or exercises.              Learning Progress Summary           Patient Acceptance, E,D, DU by PG at 5/12/2023 0903                               User Key     Initials Effective Dates Name Provider Type Discipline    PG 06/16/21 -  Thiago Ng, MONET Occupational Therapist OT              OT Recommendation and Plan  Therapy Frequency (OT): evaluation only  Plan of Care Review  Plan of Care Reviewed With: patient  Progress: improving  Outcome Evaluation: Patient instructed in safe transfer technique with education provided in home safety/fall prevention strategies.  Patient has orders to discharge home today with outpatient physical therapy scheduled.  No additional occupational therapy recommended at this time     Time Calculation:    Time Calculation- OT     Row Name 05/12/23 0905             Time Calculation- OT    OT Received On 05/12/23  -PG      OT Goal Re-Cert Due Date 05/21/23  -PG         Timed Charges    65707 - OT Therapeutic Activity Minutes 10  -PG      54943 - OT Self Care/Mgmt Minutes 15  -PG         Untimed Charges    OT Eval/Re-eval Minutes 25  -PG         Total Minutes    Timed Charges Total Minutes 25  -PG      Untimed Charges Total Minutes 25  -PG       Total Minutes 50  -PG            User Key  (r) = Recorded By, (t) = Taken By, (c) = Cosigned By     Initials Name Provider Type    PG Thiago Ng OT Occupational Therapist              Therapy Charges for Today     Code Description Service Date Service Provider Modifiers Qty    17227647543 HC OT THERAPEUTIC ACT EA 15 MIN 5/12/2023 Thiago Ng OT GO 1    13370421885 HC OT SELF CARE/MGMT/TRAIN EA 15 MIN 5/12/2023 Thiago Ng OT GO 1    [id number]  OT EVAL LOW COMPLEXITY 2 5/12/2023 Thiago Ng OT GO 1               Thiago Ng OT  5/12/2023

## 2023-05-12 NOTE — DISCHARGE SUMMARY
[facility]         HOSPITALIST  DISCHARGE SUMMARY    Patient Name: Casi Lopez  : 1975  MRN: [id number]    Date of Admission: 2023  Date of Discharge: 2023  Primary Care Physician: Adore Quispe APRN    Consults     Date and Time Order Name Status Description    2023 10:38 AM Inpatient Hospitalist Consult            Active and Resolved Hospital Problems:  Active Hospital Problems    Diagnosis POA   • **S/P total right hip arthroplasty [Z96.641] Not Applicable   • Hypertension [I10] Yes   • T2DM (type 2 diabetes mellitus) [E11.9] Yes   • Hyperlipidemia [E78.5] Yes   • Asthma [J45.909] Yes   • DARYL on CPAP [G47.33, Z99.89] Not Applicable   • Depression [F32.A] Yes   • Anxiety [F41.9] Yes   • Arthritis of right hip [M16.11] Unknown      Resolved Hospital Problems   No resolved problems to display.       Hospital Course     Hospital Course:  Casi Lopez is a 48 y.o. female with HTN, HLD, noninsulin-dependent type 2 diabetes, DARYL, asthma, anxiety/depression, osteoarthritis of the right hip with progressive worsening right hip pain that failed outpatient conservative management.  She underwent right total hip replacement on 2023.  Hospital service was consulted postoperatively to manage medical comorbidities.  Blood pressure is managed with Coreg 6.25 mg twice daily.  Diabetes managed with Jardiance and metformin.  Recent hemoglobin A1c 6.60.  Blood sugars have been controlled since admission.  No acute issues postop.    Pain controlled with oral analgesics.  Worked well with physical therapy occupational therapy.  Patient seen and evaluated on day of discharge and thought stable for discharge home with home health to follow-up with orthopedics in 2 weeks.      DISCHARGE Follow Up Recommendations for labs and diagnostics: As above      Day of Discharge     Vital Signs:  Temp:  [97.5 °F (36.4 °C)-98.9 °F (37.2 °C)] 98.9 °F (37.2 °C)  Heart Rate:  [] 91  Resp:   [12-22] 18  BP: (126-141)/(58-82) 137/68  Flow (L/min):  [0] 0  Physical Exam:   Gen. well-developed appearing stated age in no acute distress  HEENT: Normocephalic atraumatic moist membranes pupils equal round reactive light, no scleral icterus no conjunctival injection  Cardiovascular: regular rate and rhythm no murmurs rubs or gallops S1-S2, no lower extremity edema appreciated  Pulmonary: Clear to auscultation bilaterally no wheezes rales or rhonchi symmetric chest expansion, unlabored, no conversational dyspnea appreciated  Gastrointestinal: Soft nontender nondistended positive bowel sounds all 4 quadrants no rebound or guarding  Musculoskeletal: No clubbing cyanosis, warm and well-perfused, calves soft symmetric nontender bilaterally, surgical dressing clean dry intact over the right hip  Skin: Clean dry without rashs  Neuro: Cranial nerves II through XII intact grossly no sensorimotor deficits appreciated bilateral upper and lower extremities  Psych: Patient is calm cooperative and appropriate with exam not responding to internal stimuli  : No Florez catheter no bladder distention no suprapubic tenderness      Discharge Details        Discharge Medications      New Medications      Instructions Start Date   acetaminophen 500 MG tablet  Commonly known as: TYLENOL   1,000 mg, Oral, Every 8 Hours      aspirin 325 MG tablet   325 mg, Oral, Every 12 Hours Scheduled      naloxone 4 MG/0.1ML nasal spray  Commonly known as: NARCAN   Call 911. Don't prime. [place] in 1 nostril for overdose. Repeat in 2-3 minutes in other nostril if no or minimal breathing/responsiveness.      oxyCODONE 5 MG immediate release tablet  Commonly known as: ROXICODONE   5 mg, Oral, Every 4 Hours PRN      sennosides-docusate 8.6-50 MG per tablet  Commonly known as: PERICOLACE   2 tablets, Oral, 2 Times Daily         Continue These Medications      Instructions Start Date   albuterol sulfate  (90 Base) MCG/ACT inhaler  Commonly known  as: PROVENTIL HFA;VENTOLIN HFA;PROAIR HFA   Inhale 2 puffs Every 4 (Four) Hours As Needed.      amantadine 100 MG tablet  Commonly known as: SYMMETREL   100 mg, Oral, Daily      amitriptyline 50 MG tablet  Commonly known as: ELAVIL   Take 1 tablet by mouth Every Night.      ARIPiprazole 10 MG tablet  Commonly known as: ABILIFY   Take 1 tablet by mouth Every Night.      atorvastatin 20 MG tablet  Commonly known as: LIPITOR   20 mg, Oral, Every Night at Bedtime      Calcium 600+D3 600-20 MG-MCG tablet  Generic drug: Calcium Carb-Cholecalciferol   1 tablet, Oral, Daily      carvedilol 6.25 MG tablet  Commonly known as: COREG   6.25 mg, Oral, 2 Times Daily      escitalopram 10 MG tablet  Commonly known as: LEXAPRO   Take 1 tablet by mouth Daily.      gabapentin 600 MG tablet  Commonly known as: NEURONTIN   Every 8 Hours Scheduled      Jardiance 10 MG tablet tablet  Generic drug: empagliflozin   1 tablet, Daily, INSTRUCTED PER ANESTHESIA PROTOCOL      melatonin 5 MG tablet tablet   5 mg, Oral      metFORMIN 500 MG tablet  Commonly known as: GLUCOPHAGE   Take 1 tablet by mouth 2 (Two) Times a Day.      methocarbamol 750 MG tablet  Commonly known as: ROBAXIN   1 tablet, Oral, 3 Times Daily      montelukast 10 MG tablet  Commonly known as: SINGULAIR   Take 1 tablet by mouth Every Night.      potassium chloride 10 MEQ CR tablet   20 mEq, Oral, 2 Times Daily      prazosin 1 MG capsule  Commonly known as: MINIPRESS   Take 1 capsule by mouth Every Night.      prazosin 2 MG capsule  Commonly known as: MINIPRESS   2 mg, Oral, Nightly      QUEtiapine 100 MG tablet  Commonly known as: SEROquel   100 mg, Oral, Nightly      Symbicort 80-4.5 MCG/ACT inhaler  Generic drug: budesonide-formoterol   2 puffs, Inhalation, 2 Times Daily      Vitamin D3 1.25 MG (94128 UT) capsule   1 capsule, Oral, Weekly, TAKES ON FRIDAY      Vitamin E 180 MG (400 UNIT) capsule capsule   1 capsule, Oral, Daily         Stop These Medications    diclofenac 75  MG EC tablet  Commonly known as: VOLTAREN     oxyCODONE-acetaminophen 5-325 MG per tablet  Commonly known as: PERCOCET            No Known Allergies    Discharge Disposition:  Home-Health Care c    Diet:  Hospital:  Diet Order   Procedures   • Diet: Diabetic Diets; Consistent Carbohydrate; Texture: Regular Texture (IDDSI 7); Fluid Consistency: Thin (IDDSI 0)       Discharge Activity:   Activity Instructions     Gradually Increase Activity Until at Pre-Hospitalization Level            CODE STATUS:  Code Status and Medical Interventions:   Ordered at: 05/11/23 1047     Code Status (Patient has no pulse and is not breathing):    CPR (Attempt to Resuscitate)     Medical Interventions (Patient has pulse or is breathing):    Full Support     Release to patient:    Routine Release         Future Appointments   Date Time Provider Department Center   5/24/2023  8:45 AM Luci Guaman PA-C [facility] [place]   7/24/2023  9:45 AM Aurelio Biswas MD [facility] [place] [place] [facility]       Additional Instructions for the Follow-ups that You Need to Schedule     Discharge Follow-up with Specialty: Orthopedics as scheduled in 2 weeks   As directed      Specialty: Orthopedics as scheduled in 2 weeks               Pertinent  and/or Most Recent Results     PROCEDURES:      Elias Gruber MD   Physician  Orthopedics  Op Note      Signed  Date of Service:  05/11/23 0748  Creation Time:  05/11/23 0935  Case Time:  Procedures:  Surgeons:    05/11/23 0748 RIGHT TOTAL HIP ARTHROPLASTY ANTERIOR    Elias Gruber MD               Signed          TOTAL HIP ARTHROPLASTY ANTERIOR  Procedure Report     Patient Name:  Casi Lopez  YOB: 1975     Date of Surgery:  5/11/2023     Indications: Casi is a 48-year-old female with advanced right hip osteoarthritis.  We discussed treatment options.  We discussed additional nonoperative management.  We discussed surgical management with right total hip arthroplasty.  We discussed surgical  benefits including pain relief and improved function.  We discussed surgical risks including bleeding, infection, damage to nerves or blood vessels, hardware complications, fracture, dislocation, leg length discrepancy, loosening, persistent pain, anesthesia risk including mortality, DVT, and need for additional procedures.  The patient elected to proceed and consent was obtained.     Pre-op Diagnosis:   Primary osteoarthritis of right hip [M16.11]       Post-Op Diagnosis Codes:     * Primary osteoarthritis of right hip [M16.11]     Procedure/CPT® Codes:        Procedure(s):  RIGHT TOTAL HIP ARTHROPLASTY ANTERIOR     Surgical Approach: Hip Direct Anterior (Lanier-Craft)           Staff:  Surgeon(s):  Elias Gruber MD     Assistant: Luci Guaman PA-C; Lisa Prescott     Anesthesia: General     Estimated Blood Loss: 200 mL     Implants:    Implant Name Type Inv. Item Serial No.  Lot No. LRB No. Used Action   SUT NONABS MAXBRAID NMBR5 K60 38IN WHT/SHERLYN - [id number] Implant SUT NONABS MAXBRAID NMBR5 K60 38IN WHT/SHERLYN   [employer] [id number] Right 2 Implanted   SHLL ACET G7 PPS LTD/HL TI SZB 46MM - [id number] Implant SHLL ACET G7 PPS LTD/HL TI SZB 46MM   [employer] [id number] Right 1 Implanted   LINER ACET G7 VIVACIT/E NTRL HXPE SZB 32MM - [id number] Implant LINER ACET G7 VIVACIT/E NTRL HXPE SZB 32MM   [employer] [id number] Right 1 Implanted   SCRW ACET SAMEERA TRILOGY S/TAP 6.5X25 - [id number] Implant SCRW ACET SAMEERA TRILOGY S/TAP 6.5X25   [employer] [id number] Right 1 Implanted   STEM FEM/HIP AVENIR/COMPLETE HI/OFFST HA SZ1 - [id number] Implant STEM FEM/HIP AVENIR/COMPLETE HI/OFFST HA SZ1   [employer] [id number] Right 1 Implanted   HD FEM/HIP BIOLOX/DELTA CERAM 12/66H98GE MIN3.5MM - [id number] Implant HD FEM/HIP BIOLOX/DELTA CERAM 12/14X10XW MIN3.5MM   [employer] [id number] Right 1 Implanted         Specimen:          None         Findings: Right hip osteoarthritis     Complications:  None     Description of Procedure: The patient was met in the preoperative holding area and the operative extremity was marked.  The patient was transported to the operating room and general anesthesia was induced without complication.  The patient was then carefully transferred onto the Vernal table with both legs well-padded and placed into the traction boots.  2 g of preoperative Ancef was administered.  1 g of preoperative tranexamic acid was administered.  The right hip was then prepped and draped in the usual sterile fashion.  A timeout was taken to ensure the appropriate patient, procedure, and procedural site.  All were in agreement.  I made a 10 cm longitudinal incision beginning 2 cm distal and lateral to the ASIS and extending toward the fibular head.  Sharp dissection was carried down through the skin and subcutaneous tissues.  Hemostasis was obtained with Bovie electrocautery.  The fascia over the TFL was identified.  This was split sharply in line with the incision.  The fascia was elevated.  The TFL was mobilized.  A Cobra retractor was placed over the superior aspect of the femoral neck.  A Hubbard elevator was used to develop the interval between the rectus and the anterior aspect of the femur.  A Cobra retractor was then placed over the inferior aspect of the femoral neck.  At the distal aspect of the interval I identified the circumflex femoral vessels.  These were cauterized.  I then performed a T-shaped capsulotomy.  The anterior and posterior limbs of the capsule were tagged with nonabsorbable sutures.  The Cobra retractors were placed intracapsular.  I then planned and performed a femoral neck cut using C arm assistance.  This was done without complication.  The cut was completed laterally with an osteotome.  Traction was applied to the leg through the Vernal table.  I utilized a corkscrew on power to remove the femoral head without complication. Acetabular retractors were positioned over the  anterior and posterior wall without complication.  I sharply excised the labrum from the periphery of the acetabulum.  The fatty tissue in the fossa was removed with a rongeur. I began reaming under direct visualization.  The head was sized at a 46.  I started with a 43 reamer.  I medialized down to the medial wall.  I then reamed under C-arm guidance sequentially up to a size 45 reamer.  This had adequate purchase.  I was satisfied with the abduction and anteversion radiographically.  Adequate bleeding bone was present under direct visualization and the anterior and posterior walls were intact.  I irrigated the acetabulum.  I then impacted a Elza G7 46 mm cup, matching my previous reamer positioning.  This was done without complication.  The cup had adequate purchase.  The cup was irrigated and was fully seated by direct visualization.  I then used C-arm guidance to drill, measure, and place 1 acetabular screw of size 25 mm in length.  This had excellent purchase.  The cup was again irrigated.  The neutral acetabular liner was then impacted without complication and was fully seated.  Acetabular retractors were then removed.  The femoral hook for the [place] table attachment was then inserted.  All traction was removed from the leg.  The leg was dropped into extension, external rotation, and adduction.  I then performed a capsular release and released soft tissue over the greater trochanter including the piriformis.  I had adequate visualization of the proximal femur.  I used a rongeur to remove the femoral neck remnant and lateralize proximally.  I then identified the femoral canal utilizing the curved rasp.  I used this to lateralize as well.  I then began broaching.  I started with the starting broach and sequentially broached up to a size 1 for the Avenir Complete system which had excellent purchase.  I calcar planed the neck at this level.  I inserted a -3.5 neck trial with a 32 mm head.  Retractors removed and  the hip was reduced.  This was stable in extension and external rotation at 90 degrees.  C-arm fluoroscopy was brought in.  Femoral component sizing was appropriate and no periprosthetic fractures were noted.  I was satisfied with the leg lengths radiographically.  I elected for a high offset stem, but I was overall satisfied with these trial components.  The hip was dislocated without complication.  The leg was repositioned and retractors were inserted.  Trial components were removed.  I irrigated the proximal femur with normal saline.  I then impacted the size 1 Avenir Complete stem without complication, matching my previous version.  The trunnion and acetabulum were thoroughly irrigated.  The acetabulum was clear.  I impacted the size 32 mm head with a -3.5 neck without complication.  This was reduced.  The hip was again stable in extension and external rotation at 90 degrees.  Final images were obtained with C arm.  I was satisfied with component positioning.  No complications were noted.  The hip was thoroughly irrigated with Irrisept followed by normal saline.  Adequate hemostasis was obtained.  The capsule was closed with the nonabsorbable tag sutures.  Fascia over the TFL was closed with 0 Vicryl in running fashion.  An additional 1 g of tranexamic acid was administered.  Periarticular local anesthetic was injected.  Subcutaneous tissues were closed with 0 Vicryl and 2-0 Vicryl.  Skin was closed with skin staples.  The patient was carefully transported off the [place] table back onto her hospital bed.  Leg lengths and rotation were symmetric.  The patient had palpable pedal pulses.  Patient woke from anesthesia without complication and was transferred to the recovery room in stable condition.  All surgical counts were correct.     Assistant: Luci Guaman PA-C; Lisa Prescott  was responsible for performing the following activities: Retraction, Suction, Irrigation, Suturing, Closing and Placing  Dressing and their skilled assistance was necessary for the success of this case.     Elias Gruber MD      Date: 5/11/2023  Time: 09:35 EDT                        LAB RESULTS:      Lab 05/12/23 0417 05/11/23  1123   HEMOGLOBIN 9.8* 11.4*   HEMATOCRIT 30.5* 36.4         Lab 05/12/23  0417   SODIUM 137   POTASSIUM 2.9*   CHLORIDE 101   CO2 26.1   ANION GAP 9.9   BUN 14   CREATININE 0.79   EGFR 91.8   GLUCOSE 158*   CALCIUM 8.5*   MAGNESIUM 1.5*                         Brief Urine Lab Results     None        Microbiology Results (last 10 days)     ** No results found for the last 240 hours. **          XR Hip With or Without Pelvis 2 - 3 View Right    Result Date: 5/11/2023  Impression:  See above.      DINA PATINO MD       Electronically Signed and Approved By: DINA PATINO MD on 5/11/2023 at 10:25                           Labs Pending at Discharge:        Time spent on Discharge including face to face service: 25 minutes    Electronically signed by Ez Henderson MD, 05/12/23, 12:13 PM EDT.

## 2023-05-12 NOTE — SIGNIFICANT NOTE
05/12/23 0744   Plan   Final Discharge Disposition Code 06 - home with home health care   Final Note Home with [facility].

## 2023-05-12 NOTE — THERAPY TREATMENT NOTE
"Acute Care - Physical Therapy Treatment Note   [place]     Patient Name: Casi Lopez  : 1975  MRN: [id number]  Today's Date: 2023      Visit Dx:     ICD-10-CM ICD-9-CM   1. Difficulty walking  R26.2 719.7   2. Primary osteoarthritis of right hip  M16.11 715.15   3. S/P total right hip arthroplasty  Z96.641 V43.64   4. Decreased activities of daily living (ADL)  Z78.9 V49.89     Patient Active Problem List   Diagnosis   • DARYL (obstructive sleep apnea)   • Snoring   • Moderate asthma   • Angina pectoris   • Arthritis of right hip   • Hypertension   • T2DM (type 2 diabetes mellitus)   • Hyperlipidemia   • Asthma   • S/P total right hip arthroplasty   • DARYL on CPAP   • Depression   • Anxiety     Past Medical History:   Diagnosis Date   • Acid reflux    • Anxiety    • Arthritis    • Asthma     HAS INHALERS SCHEDULED AND PRN   • Depression    • Diabetes mellitus     [facility]   • H/O precordial chest pain     FOLLOWED BY DR HILL. DENIES CP/SOA. REPORTS WORKS FULL TIME IN [employer].   • Hyperlipidemia    • Hypertension    • Injury of back    • Migraines    • Pain management     [facility]   • Sleep apnea     REPORTS IS SUPPOSED TO BE RECEIVING CPAP     Past Surgical History:   Procedure Laterality Date   • CARDIAC CATHETERIZATION N/A 2021    Procedure: Left Heart Cath - R radial;  Surgeon: NNAMDI Hill MD;  Location: [facility] INVASIVE LOCATION;  Service: Cardiology;  Laterality: N/A;   • CARDIAC SURGERY      \"repair of hole in heart\" HAD REPAIR OF ATRIAL-SEPTAL DEFECT IN    • HYSTERECTOMY     • LAPAROSCOPIC TUBAL LIGATION     • TOTAL HIP ARTHROPLASTY Right 2023    Procedure: RIGHT TOTAL HIP ARTHROPLASTY ANTERIOR;  Surgeon: Elias Gruber MD;  Location: [facility] OR;  Service: Orthopedics;  Laterality: Right;     PT Assessment (last 12 hours)     PT Evaluation and Treatment     Row Name 23 1308          Physical Therapy Time and Intention    Subjective Information " no complaints  -     Document Type therapy note (daily note)  -     Mode of Treatment physical therapy;individual therapy  -RH     Patient Effort fair  -     Row Name 05/12/23 1308          Pain    Additional Documentation Pain Scale: FACES Pre/Post-Treatment (Group)  -     Row Name 05/12/23 1308          Pain Scale: FACES Pre/Post-Treatment    Pain: FACES Scale, Pretreatment 2-->hurts little bit  -RH     Posttreatment Pain Rating 2-->hurts little bit  -RH     Pain Location - Side/Orientation Right  -     Pain Location - hip  -     Row Name 05/12/23 1308          Range of Motion (ROM)    Range of Motion --  Pt R hip AAROM at 90 degrees flex and 20 degrees abd.  -     Row Name 05/12/23 1308          Strength (Manual Muscle Testing)    Strength (Manual Muscle Testing) --  Pt R hip flex strength at 3-/5.  -[facility] Name 05/12/23 1308          Transfers    Transfers sit-stand transfer;stand-sit transfer  -     Maintains Weight-bearing Status (Transfers) able to maintain  -RH     Row Name 05/12/23 1308          Sit-Stand Transfer    Sit-Stand [place] (Transfers) contact guard  -     Assistive Device (Sit-Stand Transfers) walker, front-wheeled  -[facility] Name 05/12/23 1308          Stand-Sit Transfer    Stand-Sit [place] (Transfers) contact guard  -     Assistive Device (Stand-Sit Transfers) walker, front-wheeled  -[facility] Name 05/12/23 1308          Gait/Stairs (Locomotion)    Gait/Stairs Locomotion gait/ambulation independence;gait/ambulation assistive device;distance ambulated;gait pattern;gait deviations  -     [place] Level (Gait) contact guard  -     Assistive Device (Gait) walker, front-wheeled  -     Distance in Feet (Gait) 125  -RH     Pattern (Gait) 3-point;step-through  -RH     Deviations/Abnormal Patterns (Gait) antalgic;gait speed decreased;stride length decreased  -RH     Gait Assessment/Intervention Pt amb with RW with CGA with antalgic gait and 3 point  step-through gait pattern.  -RH     Negotiation (Stairs) stairs independence;stairs assistive device;handrail location;number of steps;ascending technique;descending technique  -RH     [place] Level (Stairs) contact guard  -RH     Handrail Location (Stairs) both sides  -RH     Number of Steps (Stairs) 5 x 2  -RH     Ascending Technique (Stairs) step-to-step  -RH     Descending Technique (Stairs) step-to-step  -RH     Row Name 05/12/23 1308          Balance    Dynamic Standing Balance contact guard  -RH     Position/Device Used, Standing Balance walker, front-wheeled  -RH     Row Name             Wound 05/11/23 0800 Right anterior hip Incision    Wound - Properties Group Placement Date: 05/11/23  -SC Placement Time: 0800  -SC Present on Hospital Admission: N  -SC Side: Right  -SC Orientation: anterior  -SC Location: hip  -SC Primary Wound Type: Incision  -SC    Retired Wound - Properties Group Placement Date: 05/11/23  -SC Placement Time: 0800  -SC Present on Hospital Admission: N  -SC Side: Right  -SC Orientation: anterior  -SC Location: hip  -SC Primary Wound Type: Incision  -SC    Retired Wound - Properties Group Date first assessed: 05/11/23  -SC Time first assessed: 0800  -SC Present on Hospital Admission: N  -SC Side: Right  -SC Location: hip  -SC Primary Wound Type: Incision  -SC    Row Name 05/12/23 1308          Progress Summary (PT)    Progress Toward Functional Goals (PT) progress toward functional goals is fair  -RH           User Key  (r) = Recorded By, (t) = Taken By, (c) = Cosigned By    Initials Name Provider Type    Janna Cormier, RN Registered Nurse    RH Roge Angel PTA Physical Therapist Assistant             Right Knee Ther-ex   Exercise  Reps  Sets    Long arc Quads   10 2   Short arc Quads  10 2   Heel Slides  10 2   Ankle Pumps  10 2   Quad sets  10 2   Glut sets  10 2   Straight leg raise  10 2             PT Recommendation and Plan     Progress Summary (PT)  Progress Toward  Functional Goals (PT): progress toward functional goals is fair   Outcome Measures     Row Name 05/12/23 1300 05/11/23 1200          How much help from another person do you currently need...    Turning from your back to your side while in flat bed without using bedrails? 4  -RH 3  -DP     Moving from lying on back to sitting on the side of a flat bed without bedrails? 4  -RH 2  -DP     Moving to and from a bed to a chair (including a wheelchair)? 3  -RH 2  -DP     Standing up from a chair using your arms (e.g., wheelchair, bedside chair)? 4  -RH 3  -DP     Climbing 3-5 steps with a railing? 4  -RH 3  -DP     To walk in hospital room? 4  -RH 3  -DP     AM-PAC 6 Clicks Score (PT) 23  -RH 16  -DP        Functional Assessment    Outcome Measure Options -- AM-PAC 6 Clicks Basic Mobility (PT)  -DP           User Key  (r) = Recorded By, (t) = Taken By, (c) = Cosigned By    Initials Name Provider Type     Roge Angel PTA Physical Therapist Assistant    Mindi Alcaraz, PT Physical Therapist                 Time Calculation:    PT Charges     Row Name 05/12/23 1307             Time Calculation    PT Received On 05/12/23  -RH         Timed Charges    24412 - PT Therapeutic Exercise Minutes 18  -RH      74319 - Gait Training Minutes  5  -RH      58149 - PT Therapeutic Activity Minutes 4  -RH         Total Minutes    Timed Charges Total Minutes 27  -RH       Total Minutes 27  -RH            User Key  (r) = Recorded By, (t) = Taken By, (c) = Cosigned By    Initials Name Provider Type     Roge Angel PTA Physical Therapist Assistant              Therapy Charges for Today     Code Description Service Date Service Provider Modifiers Qty    92014794195 HC PT THER PROC EA 15 MIN 5/12/2023 Roge Angel PTA GP 1    11802748421 HC GAIT TRAINING EA 15 MIN 5/12/2023 Roge Angel PTA GP 1          PT G-Codes  Outcome Measure Options: AM-PAC 6 Clicks Daily Activity (OT), Optimal Instrument  AM-PAC 6 Clicks Score (PT):  23  AM-PAC 6 Clicks Score (OT): 19    Roge Angel, PTA  5/12/2023

## 2023-05-12 NOTE — PLAN OF CARE
Problem: Adult Inpatient Plan of Care  Goal: Plan of Care Review  Outcome: Met  Goal: Patient-Specific Goal (Individualized)  Outcome: Met  Goal: Absence of Hospital-Acquired Illness or Injury  Outcome: Met  Intervention: Identify and Manage Fall Risk  Recent Flowsheet Documentation  Taken 5/12/2023 1230 by Ivania Heller RN  Safety Promotion/Fall Prevention: safety round/check completed  Taken 5/12/2023 0830 by Ivania Heller RN  Safety Promotion/Fall Prevention: safety round/check completed  Taken 5/12/2023 0730 by Ivania Heller RN  Safety Promotion/Fall Prevention: safety round/check completed  Intervention: Prevent Skin Injury  Recent Flowsheet Documentation  Taken 5/12/2023 0730 by Ivania Heller RN  Body Position: position changed independently  Skin Protection:   adhesive use limited   tubing/devices free from skin contact  Intervention: Prevent and Manage VTE (Venous Thromboembolism) Risk  Recent Flowsheet Documentation  Taken 5/12/2023 1300 by Ivania Heller RN  Activity Management:   ambulated in room   ambulated to bathroom   up in chair  Taken 5/12/2023 0800 by Ivania Heller RN  Activity Management:   ambulated to bathroom   ambulated in room   up in chair  Taken 5/12/2023 0730 by Ivania Heller RN  Activity Management: activity encouraged  VTE Prevention/Management:   bilateral   compression stockings on  Range of Motion: ROM (range of motion) performed  Intervention: Prevent Infection  Recent Flowsheet Documentation  Taken 5/12/2023 0730 by Ivania Heller RN  Infection Prevention:   environmental surveillance performed   hand hygiene promoted   rest/sleep promoted   single patient room provided   visitors restricted/screened  Goal: Optimal Comfort and Wellbeing  Outcome: Met  Intervention: Monitor Pain and Promote Comfort  Recent Flowsheet Documentation  Taken 5/12/2023 0857 by Ivania Heller RN  Pain Management Interventions:   see MAR   quiet environment facilitated    care clustered   cold applied  Taken 5/12/2023 0730 by Ivania Heller RN  Pain Management Interventions:   see MAR   quiet environment facilitated   cold applied   care clustered  Intervention: Provide Person-Centered Care  Recent Flowsheet Documentation  Taken 5/12/2023 0730 by Ivania Heller RN  Trust Relationship/Rapport:   care explained   emotional support provided   questions answered   thoughts/feelings acknowledged  Goal: Readiness for Transition of Care  Outcome: Met     Problem: Pain Acute  Goal: Acceptable Pain Control and Functional Ability  Outcome: Met  Intervention: Prevent or Manage Pain  Recent Flowsheet Documentation  Taken 5/12/2023 1230 by Ivania Heller RN  Medication Review/Management: medications reviewed  Taken 5/12/2023 0830 by Ivaina Heller RN  Medication Review/Management: medications reviewed  Taken 5/12/2023 0730 by Ivania Heller RN  Sensory Stimulation Regulation: care clustered  Bowel Elimination Promotion:   adequate fluid intake promoted   ambulation promoted  Sleep/Rest Enhancement: awakenings minimized  Medication Review/Management: medications reviewed  Intervention: Develop Pain Management Plan  Recent Flowsheet Documentation  Taken 5/12/2023 0857 by Ivania Heller RN  Pain Management Interventions:   see MAR   quiet environment facilitated   care clustered   cold applied  Taken 5/12/2023 0730 by Ivania Heller RN  Pain Management Interventions:   see MAR   quiet environment facilitated   cold applied   care clustered  Intervention: Optimize Psychosocial Wellbeing  Recent Flowsheet Documentation  Taken 5/12/2023 0730 by Ivania Heller RN  Supportive Measures: active listening utilized  Diversional Activities:   smartphone   television     Problem: Adjustment to Surgery (Hip Arthroplasty)  Goal: Optimal Coping  Outcome: Met  Intervention: Support Psychosocial Response to Surgery and Mobility Changes  Recent Flowsheet Documentation  Taken 5/12/2023 0730 by  Ivania Heller RN  Supportive Measures: active listening utilized     Problem: Bleeding (Hip Arthroplasty)  Goal: Absence of Bleeding  Outcome: Met  Intervention: Monitor and Manage Bleeding  Recent Flowsheet Documentation  Taken 5/12/2023 0730 by Ivania Heller RN  Bleeding Management: dressing monitored     Problem: Bowel Motility Impaired (Hip Arthroplasty)  Goal: Effective Bowel Elimination  Outcome: Met  Intervention: Enhance Bowel Motility and Elimination  Recent Flowsheet Documentation  Taken 5/12/2023 0730 by Ivania Heller RN  Bowel Elimination Management: toileting offered  Bowel Motility Enhancement:   ambulation promoted   fluid intake encouraged     Problem: Fluid and Electrolyte Imbalance (Hip Arthroplasty)  Goal: Fluid and Electrolyte Balance  Outcome: Met     Problem: Functional Ability Impaired (Hip Arthroplasty)  Goal: Optimal Functional Ability  Outcome: Met  Intervention: Promote Optimal Functional Status  Recent Flowsheet Documentation  Taken 5/12/2023 1300 by Ivania Heller RN  Activity Management:   ambulated in room   ambulated to bathroom   up in chair  Assistive Device Utilized:   gait belt   walker  Taken 5/12/2023 0800 by Ivania Heller RN  Activity Management:   ambulated to bathroom   ambulated in room   up in chair  Assistive Device Utilized:   gait belt   walker  Taken 5/12/2023 0730 by Ivania Heller RN  Activity Management: activity encouraged  Assistive Device Utilized:   gait belt   walker  Self-Care Promotion: independence encouraged     Problem: Infection (Hip Arthroplasty)  Goal: Absence of Infection Signs and Symptoms  Outcome: Met     Problem: Neurovascular Compromise (Hip Arthroplasty)  Goal: Intact Neurovascular Status  Outcome: Met  Intervention: Prevent or Manage Neurovascular Compromise  Recent Flowsheet Documentation  Taken 5/12/2023 0730 by Ivania Heller RN  Compartment Syndrome Management: active flexion/extension encouraged  Compartment Syndrome  Surveillance: no pain with passive muscle stretch     Problem: Ongoing Anesthesia Effects (Hip Arthroplasty)  Goal: Anesthesia/Sedation Recovery  Outcome: Met  Intervention: Optimize Anesthesia Recovery  Recent Flowsheet Documentation  Taken 5/12/2023 1230 by Ivania Heller RN  Safety Promotion/Fall Prevention: safety round/check completed  Taken 5/12/2023 1116 by Ivania Heller RN  Patient Tolerance (IS):   good   no adverse signs/symptoms present  Administration (IS): self-administered  Level Incentive Spirometer (mL): 3000  Number of Repetitions (IS): 10  Taken 5/12/2023 0956 by Ivania Heller RN  Patient Tolerance (IS):   good   no adverse signs/symptoms present  Administration (IS): self-administered  Level Incentive Spirometer (mL): 3000  Number of Repetitions (IS): 10  Taken 5/12/2023 0830 by Ivania Heller RN  Safety Promotion/Fall Prevention: safety round/check completed  Taken 5/12/2023 0800 by Ivania Heller RN  Patient Tolerance (IS):   good   no adverse signs/symptoms present  Administration (IS):   self-administered   instruction provided, follow-up  Level Incentive Spirometer (mL): 3000  Number of Repetitions (IS): 10  Taken 5/12/2023 0730 by Ivania Heller RN  Safety Promotion/Fall Prevention: safety round/check completed  Reorientation Measures: clock in view     Problem: Pain (Hip Arthroplasty)  Goal: Acceptable Pain Control  Outcome: Met  Intervention: Prevent or Manage Pain  Recent Flowsheet Documentation  Taken 5/12/2023 0857 by Ivania Heller RN  Pain Management Interventions:   see MAR   quiet environment facilitated   care clustered   cold applied  Taken 5/12/2023 0730 by Ivania Heller RN  Pain Management Interventions:   see MAR   quiet environment facilitated   cold applied   care clustered  Diversional Activities:   smartphone   television     Problem: Postoperative Nausea and Vomiting (Hip Arthroplasty)  Goal: Nausea and Vomiting Relief  Outcome: Met     Problem:  Postoperative Urinary Retention (Hip Arthroplasty)  Goal: Effective Urinary Elimination  Outcome: Met  Intervention: Monitor and Manage Urinary Retention  Recent Flowsheet Documentation  Taken 5/12/2023 0730 by Ivania Heller RN  Urinary Elimination Promotion: toileting offered     Problem: Respiratory Compromise (Hip Arthroplasty)  Goal: Effective Oxygenation and Ventilation  Outcome: Met  Intervention: Optimize Oxygenation and Ventilation  Recent Flowsheet Documentation  Taken 5/12/2023 0730 by Ivania Heller RN  Head of Bed (HOB) Positioning: HOB elevated     Problem: Asthma Comorbidity  Goal: Maintenance of Asthma Control  Outcome: Met  Intervention: Maintain Asthma Symptom Control  Recent Flowsheet Documentation  Taken 5/12/2023 1230 by Ivania Heller RN  Medication Review/Management: medications reviewed  Taken 5/12/2023 0830 by Ivania Heller RN  Medication Review/Management: medications reviewed  Taken 5/12/2023 0730 by Ivania Heller RN  Medication Review/Management: medications reviewed     Problem: Diabetes Comorbidity  Goal: Blood Glucose Level Within Targeted Range  Outcome: Met  Intervention: Monitor and Manage Glycemia  Recent Flowsheet Documentation  Taken 5/12/2023 0730 by Ivania Heller RN  Glycemic Management: blood glucose monitored     Problem: Hypertension Comorbidity  Goal: Blood Pressure in Desired Range  Outcome: Met  Intervention: Maintain Blood Pressure Management  Recent Flowsheet Documentation  Taken 5/12/2023 1230 by Ivania Heller RN  Medication Review/Management: medications reviewed  Taken 5/12/2023 0830 by Ivania Heller RN  Medication Review/Management: medications reviewed  Taken 5/12/2023 0730 by Ivania Heller RN  Medication Review/Management: medications reviewed     Problem: Obstructive Sleep Apnea Risk or Actual Comorbidity Management  Goal: Unobstructed Breathing During Sleep  Outcome: Met     Problem: Fall Injury Risk  Goal: Absence of Fall and  Fall-Related Injury  Outcome: Met  Intervention: Identify and Manage Contributors  Recent Flowsheet Documentation  Taken 5/12/2023 1230 by Ivania Heller RN  Medication Review/Management: medications reviewed  Taken 5/12/2023 0830 by Ivania Heller RN  Medication Review/Management: medications reviewed  Taken 5/12/2023 0730 by Ivania Heller RN  Medication Review/Management: medications reviewed  Self-Care Promotion: independence encouraged  Intervention: Promote Injury-Free Environment  Recent Flowsheet Documentation  Taken 5/12/2023 1230 by Ivania Heller RN  Safety Promotion/Fall Prevention: safety round/check completed  Taken 5/12/2023 0830 by Ivania Heller RN  Safety Promotion/Fall Prevention: safety round/check completed  Taken 5/12/2023 0730 by Ivania Heller RN  Safety Promotion/Fall Prevention: safety round/check completed   Goal Outcome Evaluation:   Pt has had no new changes throughout shift and continues to remain stable. Pt had right JAMI perfomed yesterday afternoon by Dr. Gruber. Pt has had complaints of pain that has been controlled with PRN medication. Pt has been medicated x1. Pt has received DME walker and 3 in 1. Pt will use personal pharmacy for DC meds and will use home health for therapy.

## 2023-05-12 NOTE — PLAN OF CARE
Goal Outcome Evaluation:  Plan of Care Reviewed With: patient        Progress: improving  Outcome Evaluation: pt medicated x1 for right hip pain with voiced relief. pt up to bathroom with 1 assist/gait belt/walker. voiding without difficulty. no changes in pt's status.

## 2023-05-17 ENCOUNTER — TELEPHONE (OUTPATIENT)
Dept: ORTHOPEDIC SURGERY | Facility: CLINIC | Age: 48
End: 2023-05-17
Payer: COMMERCIAL

## 2023-05-17 DIAGNOSIS — M25.551 RIGHT HIP PAIN: Primary | ICD-10-CM

## 2023-05-17 NOTE — TELEPHONE ENCOUNTER
PATIENT STATES PAIN MEDICATION IS NOT HELPING HER RIGHT HIP PAIN. SHE IS S/P R JAMI 05/11/23. SHE HAS BEEN DOING HOME HEALTH PT WITH [facility], SHE WAS SEEN BY PHYSICAL THERAPIST TODAY. STATES THERAPY IS GOING WELL. INCISION LOOKS GOOD, NO REDNESS OR DRAINAGE. SHE SAYS INITIALLY THE PAIN MEDICATION HELPED BUT SINCE STARTING WITH PT THIS WEEK IT HAS NOT BEEN COVERING HER PAIN. PATIENT REQUESTING ALTERNATE PAIN MEDICATION.    [address]SI[address].

## 2023-05-18 RX ORDER — NAPROXEN 500 MG/1
500 TABLET ORAL 2 TIMES DAILY
Qty: 60 TABLET | Refills: 0 | Status: SHIPPED | OUTPATIENT
Start: 2023-05-18

## 2023-05-23 NOTE — PROGRESS NOTES
"Chief Complaint  Pain and Follow-up of the Right Hip    Subjective          Casi Lopez presents to [facility]   History of Present Illness     Casi Lopez presents today for a follow-up of her right hip.  Patient is 2 weeks postop right total hip arthroplasty performed by Dr. Gruber on 05/11/2023.  Today, patient states that she is doing okay.  She reports pain that is worse at night.  She has been taking her pain medications and naproxen without significant relief.  She has been working with home physical therapy and states that she has improved range of motion and strength.  She denies fever or chills.  Denies complications, redness, drainage from her incision sites.  Denies lower extremity numbness or tingling.  Patient is currently on aspirin for DVT prophylaxis.      No Known Allergies     Social History     Socioeconomic History   • Marital status:    Tobacco Use   • Smoking status: Never   • Smokeless tobacco: Never   Vaping Use   • Vaping Use: Never used   Substance and Sexual Activity   • Alcohol use: Never   • Drug use: Never   • Sexual activity: Defer        I reviewed the patient's chief complaint, history of present illness, review of systems, past medical history, surgical history, family history, social history, medications, and allergy list.     REVIEW OF SYSTEMS    Constitutional: Denies fevers, chills, weight loss  Cardiovascular: Denies chest pain, shortness of breath  Skin: Denies rashes, acute skin changes  Neurologic: Denies headache, loss of consciousness  MSK: Right hip pain      Objective   Vital Signs:   Ht 170.2 cm (67\")   Wt 83 kg (183 lb)   BMI 28.66 kg/m²     Body mass index is 28.66 kg/m².    Physical Exam    General: Alert, no acute distress  Gait: Antalgic favoring right using a walker for ambulation.  Right lower extremity:  Staples removed today without complications.  Anterior hip incision is healing appropriately.  No redness or " active drainage noted.  No concerning signs of infection.  Minimal swelling. No pain with passive hip ROM.  Knee extensor mechanism intact. 4/5 hip abduction.  Hip flexion intact with associated stiffness.  Leg lengths appear symmetric.  Calf soft, nontender.  Demonstrates active ankle plantarflexion and dorsiflexion.  Sensation intact over the dorsal and plantar foot.  Palpable pedal pulses.    Procedures    Imaging Results (Most Recent)     None               Assessment and Plan    Diagnoses and all orders for this visit:    1. Status post total replacement of right hip (Primary)  -     XR Hip With or Without Pelvis 2 - 3 View Right; Future        Casi Lopez presents today 2 weeks status post right total hip arthroplasty performed by Dr. Gruber on 5/11/2023. X-rays were reviewed with the patient today. Staples removed today without complications. Incision is well healing without active drainage or redness noted. No concerning signs of infection. Patient denies fever or chills. Incision site care was reviewed today. Patient instructed not to soak or submerge incision. Do not apply any lotions, creams, or ointments to the incision at this time.  We discussed concerning signs and symptoms regarding the incision site.  Patient understood and agreed. Patient instructed to continue with home physical therapy. We discussed the importance of these exercises.  We discussed transitioning to outpatient physical therapy when patient is ready.  Patient expressed understanding and agreed.  We discussed swelling management with ice, elevation, and compressive wraps as needed. We discussed the importance of weaning from narcotic medications. Patient understood and agreed. Patient will continue aspirin for DVT prophylaxis until 4 weeks post op. Patient expressed understanding.   Work note written today with restrictions.    Patient will follow up in 4 weeks for reevaluation. We will obtain new x-rays of the right hip at next  visit.       Call or return if symptoms worsen or patient has any concerns.       Follow Up   Return in about 4 weeks (around 6/21/2023).  Patient was given instructions and counseling regarding her condition or for health maintenance advice. Please see specific information pulled into the AVS if appropriate.       Luci Guaman PA-C  05/24/23  09:55 EDT

## 2023-05-24 ENCOUNTER — OFFICE VISIT (OUTPATIENT)
Dept: ORTHOPEDIC SURGERY | Facility: CLINIC | Age: 48
End: 2023-05-24
Payer: COMMERCIAL

## 2023-05-24 VITALS — BODY MASS INDEX: 28.72 KG/M2 | HEIGHT: 67 IN | WEIGHT: 183 LBS

## 2023-05-24 DIAGNOSIS — Z96.641 STATUS POST TOTAL REPLACEMENT OF RIGHT HIP: Primary | ICD-10-CM

## 2023-05-24 PROCEDURE — 99024 POSTOP FOLLOW-UP VISIT: CPT | Performed by: PHYSICIAN ASSISTANT

## 2023-05-31 ENCOUNTER — APPOINTMENT (OUTPATIENT)
Dept: GENERAL RADIOLOGY | Facility: HOSPITAL | Age: 48
End: 2023-05-31
Payer: COMMERCIAL

## 2023-05-31 ENCOUNTER — TELEPHONE (OUTPATIENT)
Dept: ORTHOPEDIC SURGERY | Facility: CLINIC | Age: 48
End: 2023-05-31

## 2023-05-31 ENCOUNTER — HOSPITAL ENCOUNTER (EMERGENCY)
Facility: HOSPITAL | Age: 48
Discharge: HOME | End: 2023-05-31
Attending: EMERGENCY MEDICINE | Admitting: EMERGENCY MEDICINE
Payer: COMMERCIAL

## 2023-05-31 VITALS
RESPIRATION RATE: 18 BRPM | HEART RATE: 76 BPM | OXYGEN SATURATION: 99 % | SYSTOLIC BLOOD PRESSURE: 145 MMHG | BODY MASS INDEX: 27.09 KG/M2 | WEIGHT: 172.62 LBS | DIASTOLIC BLOOD PRESSURE: 97 MMHG | TEMPERATURE: 97.7 F | HEIGHT: 67 IN

## 2023-05-31 DIAGNOSIS — R42 LIGHTHEADED: Primary | ICD-10-CM

## 2023-05-31 LAB
ALBUMIN SERPL-MCNC: 3.9 G/DL (ref 3.5–5.2)
ALBUMIN/GLOB SERPL: 1.1 G/DL
ALP SERPL-CCNC: 136 U/L (ref 39–117)
ALT SERPL W P-5'-P-CCNC: 16 U/L (ref 1–33)
ANION GAP SERPL CALCULATED.3IONS-SCNC: 12.1 MMOL/L (ref 5–15)
AST SERPL-CCNC: 15 U/L (ref 1–32)
BASOPHILS # BLD AUTO: 0.02 10*3/MM3 (ref 0–0.2)
BASOPHILS NFR BLD AUTO: 0.4 % (ref 0–1.5)
BILIRUB SERPL-MCNC: 0.3 MG/DL (ref 0–1.2)
BUN SERPL-MCNC: 11 MG/DL (ref 6–20)
BUN/CREAT SERPL: 17.7 (ref 7–25)
CALCIUM SPEC-SCNC: 9.5 MG/DL (ref 8.6–10.5)
CHLORIDE SERPL-SCNC: 102 MMOL/L (ref 98–107)
CO2 SERPL-SCNC: 26.9 MMOL/L (ref 22–29)
CREAT SERPL-MCNC: 0.62 MG/DL (ref 0.57–1)
DEPRECATED RDW RBC AUTO: 54.3 FL (ref 37–54)
EGFRCR SERPLBLD CKD-EPI 2021: 109.3 ML/MIN/1.73
EOSINOPHIL # BLD AUTO: 0.03 10*3/MM3 (ref 0–0.4)
EOSINOPHIL NFR BLD AUTO: 0.6 % (ref 0.3–6.2)
ERYTHROCYTE [DISTWIDTH] IN BLOOD BY AUTOMATED COUNT: 16.4 % (ref 12.3–15.4)
GLOBULIN UR ELPH-MCNC: 3.6 GM/DL
GLUCOSE SERPL-MCNC: 91 MG/DL (ref 65–99)
HCT VFR BLD AUTO: 39.1 % (ref 34–46.6)
HGB BLD-MCNC: 12.4 G/DL (ref 12–15.9)
HOLD SPECIMEN: NORMAL
HOLD SPECIMEN: NORMAL
IMM GRANULOCYTES # BLD AUTO: 0.02 10*3/MM3 (ref 0–0.05)
IMM GRANULOCYTES NFR BLD AUTO: 0.4 % (ref 0–0.5)
LYMPHOCYTES # BLD AUTO: 1.92 10*3/MM3 (ref 0.7–3.1)
LYMPHOCYTES NFR BLD AUTO: 36.8 % (ref 19.6–45.3)
MCH RBC QN AUTO: 29.1 PG (ref 26.6–33)
MCHC RBC AUTO-ENTMCNC: 31.7 G/DL (ref 31.5–35.7)
MCV RBC AUTO: 91.8 FL (ref 79–97)
MONOCYTES # BLD AUTO: 0.42 10*3/MM3 (ref 0.1–0.9)
MONOCYTES NFR BLD AUTO: 8 % (ref 5–12)
NEUTROPHILS NFR BLD AUTO: 2.81 10*3/MM3 (ref 1.7–7)
NEUTROPHILS NFR BLD AUTO: 53.8 % (ref 42.7–76)
NRBC BLD AUTO-RTO: 0 /100 WBC (ref 0–0.2)
NT-PROBNP SERPL-MCNC: 150.1 PG/ML (ref 0–450)
PLATELET # BLD AUTO: 451 10*3/MM3 (ref 140–450)
PMV BLD AUTO: 10.1 FL (ref 6–12)
POTASSIUM SERPL-SCNC: 4.1 MMOL/L (ref 3.5–5.2)
PROT SERPL-MCNC: 7.5 G/DL (ref 6–8.5)
QT INTERVAL: 315 MS
QT INTERVAL: 370 MS
RBC # BLD AUTO: 4.26 10*6/MM3 (ref 3.77–5.28)
SODIUM SERPL-SCNC: 141 MMOL/L (ref 136–145)
TROPONIN T SERPL HS-MCNC: 17 NG/L
WBC NRBC COR # BLD: 5.22 10*3/MM3 (ref 3.4–10.8)
WHOLE BLOOD HOLD COAG: NORMAL
WHOLE BLOOD HOLD SPECIMEN: NORMAL

## 2023-05-31 PROCEDURE — 85025 COMPLETE CBC W/AUTO DIFF WBC: CPT

## 2023-05-31 PROCEDURE — 71045 X-RAY EXAM CHEST 1 VIEW: CPT

## 2023-05-31 PROCEDURE — 84484 ASSAY OF TROPONIN QUANT: CPT

## 2023-05-31 PROCEDURE — 99282 EMERGENCY DEPT VISIT SF MDM: CPT

## 2023-05-31 PROCEDURE — 83880 ASSAY OF NATRIURETIC PEPTIDE: CPT

## 2023-05-31 PROCEDURE — 36415 COLL VENOUS BLD VENIPUNCTURE: CPT

## 2023-05-31 PROCEDURE — 93005 ELECTROCARDIOGRAM TRACING: CPT

## 2023-05-31 PROCEDURE — 93005 ELECTROCARDIOGRAM TRACING: CPT | Performed by: EMERGENCY MEDICINE

## 2023-05-31 PROCEDURE — 80053 COMPREHEN METABOLIC PANEL: CPT

## 2023-05-31 RX ORDER — SODIUM CHLORIDE 0.9 % (FLUSH) 0.9 %
10 SYRINGE (ML) INJECTION AS NEEDED
Status: DISCONTINUED | OUTPATIENT
Start: 2023-05-31 | End: 2023-05-31 | Stop reason: HOSPADM

## 2023-05-31 RX ADMIN — SODIUM CHLORIDE 1000 ML: 9 INJECTION, SOLUTION INTRAVENOUS at 12:30

## 2023-05-31 NOTE — TELEPHONE ENCOUNTER
[facility] PT WITH [facility] CALLED TO REPORT PATIENT IS NOT FEELING WELL TODAY. SHE IS SOA, DIAPHORETIC, TACHYCARDIC, AND EXPERIENCING LOW BLOOD PRESSURE. ADVISED E[facility] TO HAVE PATIENT PRESENT TO EMERGENCY ROOM FOR EVALUATION. DR PEARCE MADE AWARE.

## 2023-05-31 NOTE — ED PROVIDER NOTES
"Time: 12:17 PM EDT  Date of encounter:  5/31/2023  Independent Historian/Clinical History and Information was obtained by:   Patient  Chief Complaint: Abnormal vital signs at home per therapist    History is limited by: N/A    History of Present Illness:  Patient is a 48 y.o. year old female who presents to the emergency department for evaluation of abnormal vital signs at home prior to arrival.  Patient states her therapist was at her home and noted her heart rate to be high and her blood pressure to be low.  Patient states she just did not feel very good this morning.  Endorses some lightheadedness and generalized fatigue.  Once asked about difficulty breathing patient does state \"a little bit\".  Denies any recent fever or cough.  Denies any chest pain.  Patient is feeling better here in the emergency department.    HPI    Patient Care Team  Primary Care Provider: Adore Quispe APRN    Past Medical History:     No Known Allergies  Past Medical History:   Diagnosis Date   • Acid reflux    • Anxiety    • Arthritis    • Asthma     HAS INHALERS SCHEDULED AND PRN   • Depression    • Diabetes mellitus     [facility]   • H/O precordial chest pain     FOLLOWED BY DR HILL. DENIES CP/SOA. REPORTS WORKS FULL TIME IN [employer].   • Hyperlipidemia    • Hypertension    • Injury of back    • Migraines    • Pain management     [facility]   • Sleep apnea     REPORTS IS SUPPOSED TO BE RECEIVING CPAP     Past Surgical History:   Procedure Laterality Date   • CARDIAC CATHETERIZATION N/A 08/12/2021    Procedure: Left Heart Cath - R radial;  Surgeon: NNAMDI Hill MD;  Location: [facility] INVASIVE LOCATION;  Service: Cardiology;  Laterality: N/A;   • CARDIAC SURGERY      \"repair of hole in heart\" HAD REPAIR OF ATRIAL-SEPTAL DEFECT IN 1980   • HYSTERECTOMY     • LAPAROSCOPIC TUBAL LIGATION     • TOTAL HIP ARTHROPLASTY Right 5/11/2023    Procedure: RIGHT TOTAL HIP ARTHROPLASTY ANTERIOR;  Surgeon: Elias Gruber MD;  " Location: [facility] MAIN OR;  Service: Orthopedics;  Laterality: Right;     Family History   Problem Relation Age of Onset   • Diabetes Mother    • Hypertension Mother    • Hypertension Father    • Diabetes Father        Home Medications:  Prior to Admission medications    Medication Sig Start Date End Date Taking? Authorizing Provider   albuterol sulfate  (90 Base) MCG/ACT inhaler Inhale 2 puffs Every 4 (Four) Hours As Needed.    Rama Sierra MD   amantadine (SYMMETREL) 100 MG tablet Take 1 tablet by mouth Daily. 2/16/23   Rama Sierra MD   amitriptyline (ELAVIL) 50 MG tablet Take 1 tablet by mouth Every Night.    Rama Sierra MD   ARIPiprazole (ABILIFY) 10 MG tablet Take 1 tablet by mouth Every Night.    Rama Sierra MD   aspirin 325 MG tablet Take 1 tablet by mouth Every 12 (Twelve) Hours for 30 days. Indications: VTE Prophylaxis 5/12/23 6/11/23  Elias Gruber MD   atorvastatin (LIPITOR) 20 MG tablet Take 1 tablet by mouth every night at bedtime. 1/18/23   Rama Sierra MD   Calcium 600+D3 600-20 MG-MCG tablet Take 1 tablet by mouth Daily. 2/13/23   Rama Sierra MD   carvedilol (COREG) 6.25 MG tablet Take 1 tablet by mouth 2 (Two) Times a Day. 4/25/23   Marce Graham APRN   Cholecalciferol (Vitamin D3) 1.25 MG (31231 UT) capsule Take 1 capsule by mouth 1 (One) Time Per Week. TAKES ON FRIDAY 3/15/23   Rama Sierra MD   escitalopram (LEXAPRO) 10 MG tablet Take 1 tablet by mouth Daily.    Rama Sierra MD   gabapentin (NEURONTIN) 600 MG tablet Every 8 (Eight) Hours.    Rama Sierra MD   Jardiance 10 MG tablet tablet 1 tablet Daily. INSTRUCTED PER ANESTHESIA PROTOCOL 1/18/23   Rama Sierra MD   melatonin 5 MG tablet tablet Take 1 tablet by mouth.    Rama Sierra MD   metFORMIN (GLUCOPHAGE) 500 MG tablet Take 1 tablet by mouth 2 (Two) Times a Day.    Rama Sierra MD   methocarbamol (ROBAXIN) 750 MG  tablet Take 1 tablet by mouth 3 (Three) Times a Day. 3/22/23   Rama Sierra MD   montelukast (SINGULAIR) 10 MG tablet Take 1 tablet by mouth Every Night.    Rama Sierra MD   naloxone (NARCAN) 4 MG/0.1ML nasal spray Call 911. Don't prime. [place] in 1 nostril for overdose. Repeat in 2-3 minutes in other nostril if no or minimal breathing/responsiveness. 5/11/23   Elias Gruber MD   naproxen (NAPROSYN) 500 MG tablet Take 1 tablet by mouth 2 (Two) Times a Day. 5/18/23   Elias Gruber MD   potassium chloride 10 MEQ CR tablet Take 2 tablets by mouth 2 (Two) Times a Day. 7/30/21   NNAMDI Hill MD   prazosin (MINIPRESS) 1 MG capsule Take 1 capsule by mouth Every Night.    Rama Sierra MD   prazosin (MINIPRESS) 2 MG capsule Take 1 capsule by mouth Every Night. 6/22/21   Emergency, Nurse Epic, RN   QUEtiapine (SEROquel) 100 MG tablet Take 1 tablet by mouth Every Night. 5/9/23   Rama Sierra MD   sennosides-docusate (PERICOLACE) 8.6-50 MG per tablet Take 2 tablets by mouth 2 (Two) Times a Day. 5/12/23   Elias Gruber MD   Symbicort 80-4.5 MCG/ACT inhaler Inhale 2 puffs 2 (Two) Times a Day. 6/22/21   Rama Sierra MD   Vitamin E 180 MG (400 UNIT) capsule capsule Take 1 capsule by mouth Daily. 1/18/23   Rama Sierra MD        Social History:   Social History     Tobacco Use   • Smoking status: Never   • Smokeless tobacco: Never   Vaping Use   • Vaping Use: Never used   Substance Use Topics   • Alcohol use: Never   • Drug use: Never         Review of Systems:  Review of Systems   Constitutional: Negative for chills and fever.   HENT: Negative for congestion, rhinorrhea and sore throat.    Eyes: Negative for photophobia.   Respiratory: Positive for shortness of breath. Negative for apnea, cough and chest tightness.    Cardiovascular: Negative for chest pain and palpitations.   Gastrointestinal: Negative for abdominal pain, diarrhea, nausea and vomiting.   Endocrine:  "Negative.    Genitourinary: Negative for difficulty urinating and dysuria.   Musculoskeletal: Negative for back pain, joint swelling and myalgias.   Skin: Negative for color change and wound.   Allergic/Immunologic: Negative.    Neurological: Positive for light-headedness. Negative for seizures and headaches.   Psychiatric/Behavioral: Negative.    All other systems reviewed and are negative.       Physical Exam:  /97   Pulse 76   Temp 97.7 °F (36.5 °C) (Oral)   Resp 18   Ht 170.2 cm (67\")   Wt 78.3 kg (172 lb 9.9 oz)   SpO2 99%   BMI 27.04 kg/m²     Physical Exam  Vitals and nursing note reviewed.   Constitutional:       Appearance: Normal appearance.   HENT:      Head: Normocephalic and atraumatic.      Nose: Nose normal.      Mouth/Throat:      Mouth: Mucous membranes are dry.   Eyes:      Extraocular Movements: Extraocular movements intact.      Pupils: Pupils are equal, round, and reactive to light.   Cardiovascular:      Rate and Rhythm: Normal rate and regular rhythm.      Pulses: Normal pulses.      Heart sounds: Normal heart sounds.   Pulmonary:      Effort: Pulmonary effort is normal. No respiratory distress.      Breath sounds: Normal breath sounds. No stridor. No wheezing.   Abdominal:      General: Bowel sounds are normal.      Palpations: Abdomen is soft.      Tenderness: There is no abdominal tenderness.   Musculoskeletal:         General: No swelling. Normal range of motion.      Cervical back: Normal range of motion and neck supple.   Skin:     General: Skin is warm and dry.      Coloration: Skin is not jaundiced.   Neurological:      General: No focal deficit present.      Mental Status: She is alert and oriented to person, place, and time. Mental status is at baseline.   Psychiatric:         Mood and Affect: Mood normal.         Behavior: Behavior normal.         Judgment: Judgment normal.                  Procedures:  Procedures      Medical Decision Making:      Comorbidities that " affect care:    Asthma, hypertension, diabetes, sleep apnea    External Notes reviewed:    Admission encounter review: 5/11/2023 through 5/12/2023.  Description: Right hip arthroplasty.      The following orders were placed and all results were independently analyzed by me:  Orders Placed This Encounter   Procedures   • XR Chest 1 View   • [place] Draw   • Comprehensive Metabolic Panel   • BNP   • Single High Sensitivity Troponin T   • CBC Auto Differential   • NPO Diet NPO Type: Strict NPO   • Undress & Gown   • Continuous Pulse Oximetry   • Vital Signs   • Oxygen Therapy- Nasal Cannula; Titrate 1-6 LPM Per SpO2; 90 - 95%   • ECG 12 Lead ED Triage Standing Order; SOA   • ECG 12 Lead Rhythm Change   • Insert Peripheral IV   • CBC & Differential   • Green Top (Gel)   • Lavender Top   • Gold Top - SST   • Light Blue Top       Medications Given in the Emergency Department:  Medications   sodium chloride 0.9 % flush 10 mL (has no administration in time range)   sodium chloride 0.9 % bolus 1,000 mL (0 mL Intravenous Stopped 5/31/23 1300)        ED Course:    ED Course as of 05/31/23 1537   Wed May 31, 2023   1219 I have personally interpreted the EKG today and it shows no evidence of any acute ischemia or heart arrhythmia.  Unchanged from 4/25/2023 [RP]   1436 Reevaluation: Patient has never been hypotensive here.  Heart rate consistently in the 70s.  O2 sats 99% on room air with no increased work of breathing.  At no point voiced any chest pain.  Happy to go home. [RP]      ED Course User Index  [RP] Manolo Magaña MD       Labs:    Lab Results (last 24 hours)     Procedure Component Value Units Date/Time    CBC & Differential [[id number]]  (Abnormal) Collected: 05/31/23 1205    Specimen: Blood from Arm, Right Updated: 05/31/23 1214    Narrative:      The following orders were created for panel order CBC & Differential.  Procedure                               Abnormality         Status                     ---------                                -----------         ------                     CBC Auto Differential[[id number]]        Abnormal            Final result                 Please view results for these tests on the individual orders.    CBC Auto Differential [[id number]]  (Abnormal) Collected: 05/31/23 1205    Specimen: Blood from Arm, Right Updated: 05/31/23 1214     WBC 5.22 10*3/mm3      RBC 4.26 10*6/mm3      Hemoglobin 12.4 g/dL      Hematocrit 39.1 %      MCV 91.8 fL      MCH 29.1 pg      MCHC 31.7 g/dL      RDW 16.4 %      RDW-SD 54.3 fl      MPV 10.1 fL      Platelets 451 10*3/mm3      Neutrophil % 53.8 %      Lymphocyte % 36.8 %      Monocyte % 8.0 %      Eosinophil % 0.6 %      Basophil % 0.4 %      Immature Grans % 0.4 %      Neutrophils, Absolute 2.81 10*3/mm3      Lymphocytes, Absolute 1.92 10*3/mm3      Monocytes, Absolute 0.42 10*3/mm3      Eosinophils, Absolute 0.03 10*3/mm3      Basophils, Absolute 0.02 10*3/mm3      Immature Grans, Absolute 0.02 10*3/mm3      nRBC 0.0 /100 WBC     Comprehensive Metabolic Panel [[id number]]  (Abnormal) Collected: 05/31/23 1314    Specimen: Blood from Arm, Right Updated: 05/31/23 1341     Glucose 91 mg/dL      BUN 11 mg/dL      Creatinine 0.62 mg/dL      Sodium 141 mmol/L      Potassium 4.1 mmol/L      Chloride 102 mmol/L      CO2 26.9 mmol/L      Calcium 9.5 mg/dL      Total Protein 7.5 g/dL      Albumin 3.9 g/dL      ALT (SGPT) 16 U/L      AST (SGOT) 15 U/L      Alkaline Phosphatase 136 U/L      Total Bilirubin 0.3 mg/dL      Globulin 3.6 gm/dL      A/G Ratio 1.1 g/dL      BUN/Creatinine Ratio 17.7     Anion Gap 12.1 mmol/L      eGFR 109.3 mL/min/1.73     Narrative:      GFR Normal >60  Chronic Kidney Disease <60  Kidney Failure <15      BNP [[id number]]  (Normal) Collected: 05/31/23 1314    Specimen: Blood from Arm, Right Updated: 05/31/23 1338     proBNP 150.1 pg/mL     Narrative:      Among patients with dyspnea, NT-proBNP is highly sensitive for the detection of acute  congestive heart failure. In addition NT-proBNP of <300 pg/ml effectively rules out acute congestive heart failure with 99% negative predictive value.      Single High Sensitivity Troponin T [[id number]]  (Abnormal) Collected: 05/31/23 1314    Specimen: Blood from Arm, Right Updated: 05/31/23 1341     HS Troponin T 17 ng/L     Narrative:      High Sensitive Troponin T Reference Range:  <10.0 ng/L- Negative Female for AMI  <15.0 ng/L- Negative Male for AMI  >=10 - Abnormal Female indicating possible myocardial injury.  >=15 - Abnormal Male indicating possible myocardial injury.   Clinicians would have to utilize clinical acumen, EKG, Troponin, and serial changes to determine if it is an Acute Myocardial Infarction or myocardial injury due to an underlying chronic condition.                Imaging:    XR Chest 1 View    Result Date: 5/31/2023  PROCEDURE: XR CHEST 1 VW  COMPARISON: [facility], , XR CHEST 1 VW, 12/16/2022, 12:17.  INDICATIONS: SHORTNESS OF BREATH  FINDINGS:  LUNGS: Normal.  No significant pulmonary parenchymal abnormalities.  VASCULATURE: Normal.  Unremarkable pulmonary vasculature.  CARDIAC: Normal.  No cardiac silhouette abnormality or cardiomegaly.  MEDIASTINUM: Normal.  No visible mass or adenopathy.  PLEURA: Normal.  No effusion or pleural thickening.  BONES: Median sternotomy wires appear intact. OTHER: Negative.        No acute cardiopulmonary process identified.       ROBBIN SEBASTIAN MD       Electronically Signed and Approved By: ROBBIN SEBASTIAN MD on 5/31/2023 at 12:31                 Differential Diagnosis and Discussion:    Dizziness: Based on the patient's history, signs, and symptoms, the diffential diagnosis includes but is not limited to meningitis, stroke, sepsis, subarachnoid hemorrhage, intracranial bleeding, encephalitis, vertigo, electrolyte imbalance, and metabolic disorders.    All labs were reviewed and interpreted by me.  All X-rays impressions were  independently interpreted by me.  EKG was interpreted by me.    MDM         Patient Care Considerations:    CT CHEST: I considered ordering a CT scan of the chest, however Patient has no chest pain.      Consultants/Shared Management Plan:    None    Social Determinants of Health:    Patient is independent, reliable, and has access to care.       Disposition and Care Coordination:    Discharged: The patient is suitable and stable for discharge with no need for consideration of observation or admission.    I have explained the patient´s condition, diagnoses and treatment plan based on the information available to me at this time. I have answered questions and addressed any concerns. The patient has a good  understanding of the patient´s diagnosis, condition, and treatment plan as can be expected at this point. The vital signs have been stable. The patient´s condition is stable and appropriate for discharge from the emergency department.      The patient will pursue further outpatient evaluation with the primary care physician or other designated or consulting physician as outlined in the discharge instructions. They are agreeable to this plan of care and follow-up instructions have been explained in detail. The patient has received these instructions in written format and have expressed an understanding of the discharge instructions. The patient is aware that any significant change in condition or worsening of symptoms should prompt an immediate return to this or the closest emergency department or call to 911.    Final diagnoses:   Lightheaded        ED Disposition     ED Disposition   Discharge    Condition   Stable    Comment   --             This medical record created using voice recognition software.           Manolo Magaña MD  05/31/23 5982

## 2023-08-09 ENCOUNTER — OFFICE VISIT (OUTPATIENT)
Dept: ORTHOPEDIC SURGERY | Facility: CLINIC | Age: 48
End: 2023-08-09
Payer: COMMERCIAL

## 2023-08-09 VITALS
SYSTOLIC BLOOD PRESSURE: 132 MMHG | OXYGEN SATURATION: 96 % | HEIGHT: 67 IN | HEART RATE: 80 BPM | BODY MASS INDEX: 28.63 KG/M2 | DIASTOLIC BLOOD PRESSURE: 92 MMHG | WEIGHT: 182.4 LBS

## 2023-08-09 DIAGNOSIS — M25.551 RIGHT HIP PAIN: ICD-10-CM

## 2023-08-09 DIAGNOSIS — Z96.641 STATUS POST TOTAL REPLACEMENT OF RIGHT HIP: Primary | ICD-10-CM

## 2023-08-09 NOTE — PROGRESS NOTES
"Chief Complaint  Follow-up of the Right Hip    Subjective          Casi Lopez presents to [facility]   History of Present Illness    Casi Lopez presents today for a follow-up of her right hip.  Patient is 3 months postop right total hip arthroplasty performed on 5/11/2023.  Today, patient states that she is doing fine.  She states that she finished physical therapy on July 10.  She states that she does some of her home exercises.  She reports occasional pain to her hip.  She describes bilateral knee pain.  She states that her incision is well-healed.  Denies new injuries.  Denies numbness or tingling.      No Known Allergies     Social History     Socioeconomic History    Marital status:    Tobacco Use    Smoking status: Never    Smokeless tobacco: Never   Vaping Use    Vaping Use: Never used   Substance and Sexual Activity    Alcohol use: Never    Drug use: Never    Sexual activity: Defer        I reviewed the patient's chief complaint, history of present illness, review of systems, past medical history, surgical history, family history, social history, medications, and allergy list.     REVIEW OF SYSTEMS    Constitutional: Denies fevers, chills, weight loss  Cardiovascular: Denies chest pain, shortness of breath  Skin: Denies rashes, acute skin changes  Neurologic: Denies headache, loss of consciousness  MSK: Right hip pain      Objective   Vital Signs:   /92   Pulse 80   Ht 170.2 cm (67\")   Wt 82.7 kg (182 lb 6.4 oz)   SpO2 96%   BMI 28.57 kg/mý     Body mass index is 28.57 kg/mý.    Physical Exam    General: Alert. No acute distress.  Gait: Slightly antalgic favoring right.  Right lower extremity: No groin pain with passive hip range of motion.  Hip flexion intact with associated stiffness.  5 out of 5 hip abduction.  Knee extensor mechanism intact.  Calf soft, nontender.  Demonstrates active ankle plantarflexion and dorsiflexion.  Sensation intact over " dorsal and plantar foot.  Palpable pedal pulses.    Procedures    Imaging Results (Most Recent)       Procedure Component Value Units Date/Time    XR Hip With or Without Pelvis 2 - 3 View Right [[id number]] Resulted: 08/09/23 0839     Updated: 08/09/23 0840    Narrative:      Indications: Follow-up right total hip arthroplasty    Views: AP and frog-lateral right hip    Findings: Press-fit right total hip arthroplasty implants in place with   stable positioning.  No hardware loosening or complications.  No   periprosthetic fractures.  Hip is reduced.    Comparative Data: Comparative data found and reviewed today.                   Assessment and Plan    Diagnoses and all orders for this visit:    1. Status post total replacement of right hip (Primary)  -     diclofenac (VOLTAREN) 50 MG EC tablet; Take 1 tablet by mouth 2 (Two) Times a Day.  Dispense: 60 tablet; Refill: 0    2. Right hip pain  -     XR Hip With or Without Pelvis 2 - 3 View Right        Casi Lopez presents today 3 months postop right total hip arthroplasty performed on 5/11/2023.  X-rays reviewed with the patient today.  Patient is instructed to continue with her home exercises.  We discussed the importance of exercises for both range of motion and strength.  Patient expressed understanding.  New home exercises were provided today.  Knee exercises were provided as well.  Diclofenac was prescribed today.      Patient will follow up in 6 weeks for reevaluation.  We will obtain new x-rays of the right hip at next visit.      Call or return if symptoms worsen or patient has any concerns.       Follow Up   Return in about 6 weeks (around 9/20/2023).  Patient was given instructions and counseling regarding her condition or for health maintenance advice. Please see specific information pulled into the AVS if appropriate.     Luci Guaman PA-C  08/09/23  09:16 EDT

## 2023-09-24 NOTE — PROGRESS NOTES
"Chief Complaint  Follow-up of the Right Hip    Subjective          Casi Lopez presents to [facility]   History of Present Illness    Casi Lopez presents today for a follow-up of her right hip.  Patient is 4.5 months postop right total hip arthroplasty performed on 5/11/2023.  Today, patient states that she is doing fine.  She reports some soreness to her hip.  States that she has been working quite frequently.  She has continued doing her home exercises.  She uses a hot pack on her hip provides her with some relief.  Her incision is well-healed.  She does report good hip range of motion.  She feels that her hip is stable.  She has no new injuries or concerns.      No Known Allergies     Social History     Socioeconomic History    Marital status:    Tobacco Use    Smoking status: Never    Smokeless tobacco: Never   Vaping Use    Vaping Use: Never used   Substance and Sexual Activity    Alcohol use: Never    Drug use: Never    Sexual activity: Defer        I reviewed the patient's chief complaint, history of present illness, review of systems, past medical history, surgical history, family history, social history, medications, and allergy list.     REVIEW OF SYSTEMS    Constitutional: Denies fevers, chills, weight loss  Cardiovascular: Denies chest pain, shortness of breath  Skin: Denies rashes, acute skin changes  Neurologic: Denies headache, loss of consciousness  MSK: Right hip pain      Objective   Vital Signs:   /82   Pulse 74   Ht 170.2 cm (67\")   Wt 82.6 kg (182 lb)   SpO2 99%   BMI 28.51 kg/m²     Body mass index is 28.51 kg/m².    Physical Exam    General: Alert. No acute distress.   Right lower extremity: Well-healed scar.  Palpable scar tissue behind the incision.  Hip flexion intact.  5/5 hip abduction.  No pain on passive hip range of motion.  No pain with passive logroll of the hip.  Knee extensor mechanism intact.  Calf soft, nontender.  " Demonstrates active ankle plantarflexion dorsiflexion.  Sensation intact over dorsal and plantar foot.  Palpable pulses.    Procedures    Imaging Results (Most Recent)       Procedure Component Value Units Date/Time    XR Hip With or Without Pelvis 2 - 3 View Right [[id number]] Resulted: 09/25/23 1547     Updated: 09/25/23 1548    Narrative:      Indications: Follow-up right hip total arthroplasty    Views: AP and frog-lateral right hip    Findings: Press-fit right total hip arthroplasty implants in place.  A   single acetabular screw remains in place.  Hardware is stable.  No   hardware loosening or complications.  Hip is reduced.  No periprosthetic   fractures.    Comparative Data: Comparative data found and reviewed today.                   Assessment and Plan    Diagnoses and all orders for this visit:    1. Status post total replacement of right hip (Primary)  -     XR Hip With or Without Pelvis 2 - 3 View Right    2. Right hip pain        Casi Lopez presents today 4.5 months postop right total hip arthroplasty performed on 5/11/2023.  X-rays reviewed with the patient today.  Patient instructed to continue with her home exercises.  Incision is well-healed.  We discussed massaging over the scar to release scar tissue and desensitize the area.  Continue with activities as tolerated.  Work note written today.    Patient will follow up in 6 weeks for reevaluation.  We will obtain new x-rays of the right hip at next visit.      Call or return if symptoms worsen or patient has any concerns.       Follow Up   Return in about 6 weeks (around 11/6/2023).  Patient was given instructions and counseling regarding her condition or for health maintenance advice. Please see specific information pulled into the AVS if appropriate.     Luci Guaman PA-C  09/25/23  15:48 EDT

## 2023-09-25 ENCOUNTER — OFFICE VISIT (OUTPATIENT)
Dept: ORTHOPEDIC SURGERY | Facility: CLINIC | Age: 48
End: 2023-09-25

## 2023-09-25 VITALS
HEART RATE: 74 BPM | BODY MASS INDEX: 28.56 KG/M2 | DIASTOLIC BLOOD PRESSURE: 82 MMHG | SYSTOLIC BLOOD PRESSURE: 133 MMHG | OXYGEN SATURATION: 99 % | HEIGHT: 67 IN | WEIGHT: 182 LBS

## 2023-09-25 DIAGNOSIS — Z96.641 STATUS POST TOTAL REPLACEMENT OF RIGHT HIP: Primary | ICD-10-CM

## 2023-09-25 DIAGNOSIS — M25.551 RIGHT HIP PAIN: ICD-10-CM

## 2023-09-25 RX ORDER — CARBAMAZEPINE 100 MG/1
CAPSULE, EXTENDED RELEASE ORAL
COMMUNITY
Start: 2023-09-02

## 2023-09-25 RX ORDER — HYDROXYZINE 50 MG/1
TABLET, FILM COATED ORAL
COMMUNITY
Start: 2023-08-29

## 2023-09-25 RX ORDER — LORATADINE 10 MG/1
1 TABLET ORAL DAILY
COMMUNITY
Start: 2023-08-29

## 2023-10-17 ENCOUNTER — OFFICE VISIT (OUTPATIENT)
Dept: CARDIOLOGY | Facility: CLINIC | Age: 48
End: 2023-10-17
Payer: COMMERCIAL

## 2023-10-17 VITALS
HEIGHT: 67 IN | HEART RATE: 82 BPM | DIASTOLIC BLOOD PRESSURE: 80 MMHG | BODY MASS INDEX: 27.65 KG/M2 | WEIGHT: 176.2 LBS | SYSTOLIC BLOOD PRESSURE: 126 MMHG

## 2023-10-17 DIAGNOSIS — I10 ESSENTIAL HYPERTENSION: ICD-10-CM

## 2023-10-17 DIAGNOSIS — Q21.10 ASD (ATRIAL SEPTAL DEFECT): ICD-10-CM

## 2023-10-17 DIAGNOSIS — R55 SYNCOPE AND COLLAPSE: Primary | ICD-10-CM

## 2023-10-17 PROCEDURE — 99214 OFFICE O/P EST MOD 30 MIN: CPT | Performed by: NURSE PRACTITIONER

## 2023-10-17 PROCEDURE — 93000 ELECTROCARDIOGRAM COMPLETE: CPT | Performed by: NURSE PRACTITIONER

## 2023-10-17 PROCEDURE — 3079F DIAST BP 80-89 MM HG: CPT | Performed by: NURSE PRACTITIONER

## 2023-10-17 PROCEDURE — 1160F RVW MEDS BY RX/DR IN RCRD: CPT | Performed by: NURSE PRACTITIONER

## 2023-10-17 PROCEDURE — 3074F SYST BP LT 130 MM HG: CPT | Performed by: NURSE PRACTITIONER

## 2023-10-17 PROCEDURE — 1159F MED LIST DOCD IN RCRD: CPT | Performed by: NURSE PRACTITIONER

## 2023-10-17 RX ORDER — ONDANSETRON 4 MG/1
4 TABLET, ORALLY DISINTEGRATING ORAL EVERY 8 HOURS PRN
COMMUNITY
Start: 2023-10-12

## 2023-10-17 RX ORDER — OMEPRAZOLE 20 MG/1
1 CAPSULE, DELAYED RELEASE ORAL DAILY
COMMUNITY
Start: 2023-10-12

## 2023-10-17 NOTE — PROGRESS NOTES
"Chief Complaint  Hypertension and Loss of Consciousness    Subjective            Casi Lopez presents to [facility] CARDIOLOGY  History of Present Illness    Ms. Lopez is here today for follow-up evaluation management of essential hypertension mixed hyperlipidemia.  She is here today for early follow-up due to new onset syncope.  First episode happened about 3 weeks ago, she was talking to her daughter and lost consciousness.  She had no warning signs or symptoms.  She had a similar episode a week later.  Most recently last night.  All 3 episodes had no preceding symptoms.  She reports a loss of consciousness for a few seconds each time.  She did not seek emergency care for these episodes.  She denies chest pain, excessive shortness of breath, or palpitations.  No dizziness or lightheadedness.    PMH  Past Medical History:   Diagnosis Date    Acid reflux     Anxiety     Arthritis     Asthma     HAS INHALERS SCHEDULED AND PRN    Depression     Diabetes mellitus     AVERAGE AM 'S    H/O precordial chest pain     FOLLOWED BY DR HILL. DENIES CP/SOA. REPORTS WORKS FULL TIME IN [employer].    Hyperlipidemia     Hypertension     Injury of back     Migraines     Pain management     [facility]    Sleep apnea     REPORTS IS SUPPOSED TO BE RECEIVING CPAP         SURGICALHX  Past Surgical History:   Procedure Laterality Date    CARDIAC CATHETERIZATION N/A 08/12/2021    Procedure: Left Heart Cath - R radial;  Surgeon: NNAMDI Hill MD;  Location: [facility] CATH INVASIVE LOCATION;  Service: Cardiology;  Laterality: N/A;    CARDIAC SURGERY      \"repair of hole in heart\" HAD REPAIR OF ATRIAL-SEPTAL DEFECT IN 1980    HYSTERECTOMY      LAPAROSCOPIC TUBAL LIGATION      TOTAL HIP ARTHROPLASTY Right 5/11/2023    Procedure: RIGHT TOTAL HIP ARTHROPLASTY ANTERIOR;  Surgeon: Elias Gruber MD;  Location: [facility] MAIN OR;  Service: Orthopedics;  Laterality: Right;        SOC  Social History     Socioeconomic " History    Marital status:    Tobacco Use    Smoking status: Never    Smokeless tobacco: Never   Vaping Use    Vaping Use: Never used   Substance and Sexual Activity    Alcohol use: Never    Drug use: Never    Sexual activity: Defer         FAMHX  Family History   Problem Relation Age of Onset    Diabetes Mother     Hypertension Mother     Hypertension Father     Diabetes Father           ALLERGY  No Known Allergies     MEDSCURRENT    Current Outpatient Medications:     albuterol sulfate  (90 Base) MCG/ACT inhaler, Inhale 2 puffs Every 4 (Four) Hours As Needed., Disp: , Rfl:     amantadine (SYMMETREL) 100 MG tablet, Take 1 tablet by mouth Daily., Disp: , Rfl:     amitriptyline (ELAVIL) 50 MG tablet, Take 1 tablet by mouth Every Night., Disp: , Rfl:     ARIPiprazole (ABILIFY) 10 MG tablet, Take 1 tablet by mouth Every Night., Disp: , Rfl:     aspirin 325 MG EC tablet, Take 1 tablet by mouth Every 12 (Twelve) Hours., Disp: , Rfl:     atorvastatin (LIPITOR) 20 MG tablet, Take 1 tablet by mouth every night at bedtime., Disp: , Rfl:     Calcium 600+D3 600-20 MG-MCG tablet, Take 1 tablet by mouth Daily., Disp: , Rfl:     carBAMazepine (CARBATROL) 100 MG 12 hr capsule, TAKE 1 CAPSULE BY MOUTH TWICE DAILY X 2 WEEKS THEN TAKE 1 CAPSULE BY MOUTH EVERY DAY X 1 WEEK THEN STOP., Disp: , Rfl:     carvedilol (COREG) 6.25 MG tablet, Take 1 tablet by mouth 2 (Two) Times a Day., Disp: 180 tablet, Rfl: 3    Cholecalciferol (Vitamin D3) 1.25 MG (36742 UT) capsule, Take 1 capsule by mouth 1 (One) Time Per Week. TAKES ON FRIDAY, Disp: , Rfl:     diclofenac (VOLTAREN) 50 MG EC tablet, Take 1 tablet by mouth 2 (Two) Times a Day., Disp: 60 tablet, Rfl: 0    escitalopram (LEXAPRO) 10 MG tablet, Take 1 tablet by mouth Daily., Disp: , Rfl:     gabapentin (NEURONTIN) 600 MG tablet, Every 8 (Eight) Hours., Disp: , Rfl:     hydrOXYzine (ATARAX) 50 MG tablet, TAKE 1/2 TO 1 TABLET BY MOUTH EVERY 6 HOURS AS NEEDED FOR ITCHING, Disp: ,  Rfl:     Jardiance 10 MG tablet tablet, 1 tablet Daily. INSTRUCTED PER ANESTHESIA PROTOCOL, Disp: , Rfl:     loratadine (CLARITIN) 10 MG tablet, Take 1 tablet by mouth Daily., Disp: , Rfl:     melatonin 5 MG tablet tablet, Take 1 tablet by mouth., Disp: , Rfl:     metFORMIN (GLUCOPHAGE) 500 MG tablet, Take 1 tablet by mouth 2 (Two) Times a Day., Disp: , Rfl:     methocarbamol (ROBAXIN) 750 MG tablet, Take 1 tablet by mouth 3 (Three) Times a Day., Disp: , Rfl:     montelukast (SINGULAIR) 10 MG tablet, Take 1 tablet by mouth Every Night., Disp: , Rfl:     naloxone (NARCAN) 4 MG/0.1ML nasal spray, Call 911. Don't prime. [place] in 1 nostril for overdose. Repeat in 2-3 minutes in other nostril if no or minimal breathing/responsiveness., Disp: 2 each, Rfl: 0    omeprazole (priLOSEC) 20 MG capsule, Take 1 capsule by mouth Daily., Disp: , Rfl:     ondansetron ODT (ZOFRAN-ODT) 4 MG disintegrating tablet, 1 tablet Every 8 (Eight) Hours As Needed for Nausea or Vomiting., Disp: , Rfl:     oxyCODONE-acetaminophen (PERCOCET) 5-325 MG per tablet, Take 1 tablet by mouth 3 (Three) Times a Day., Disp: , Rfl:     potassium chloride 10 MEQ CR tablet, Take 2 tablets by mouth 2 (Two) Times a Day., Disp: 180 tablet, Rfl: 3    prazosin (MINIPRESS) 1 MG capsule, Take 1 capsule by mouth Every Night., Disp: , Rfl:     prazosin (MINIPRESS) 2 MG capsule, Take 1 capsule by mouth Every Night., Disp: , Rfl:     QUEtiapine (SEROquel) 100 MG tablet, Take 1 tablet by mouth Every Night., Disp: , Rfl:     Symbicort 80-4.5 MCG/ACT inhaler, Inhale 2 puffs 2 (Two) Times a Day., Disp: , Rfl:     Vitamin E 180 MG (400 UNIT) capsule capsule, Take 1 capsule by mouth Daily., Disp: , Rfl:     sennosides-docusate (PERICOLACE) 8.6-50 MG per tablet, Take 2 tablets by mouth 2 (Two) Times a Day., Disp: 20 tablet, Rfl: 0      Review of Systems   Constitutional: Negative for malaise/fatigue.   Cardiovascular:  Positive for syncope. Negative for chest pain, dyspnea on  "exertion, irregular heartbeat and palpitations.   Neurological:  Negative for dizziness and light-headedness.        Objective     /80   Pulse 82   Ht 170.2 cm (67.01\")   Wt 79.9 kg (176 lb 3.2 oz)   BMI 27.59 kg/m²       General Appearance:   well developed  well nourished  HENT:   oropharynx moist  lips not cyanotic  Neck:  thyroid not enlarged  supple  Respiratory:  no respiratory distress  normal breath sounds  no rales  Cardiovascular:  no jugular venous distention  regular rhythm  apical impulse normal  S1 normal, S2 normal  no S3, no S4   no murmur  no rub, no thrill  carotid pulses normal; no bruit  pedal pulses normal  lower extremity edema: none    Musculoskeletal:  no clubbing of fingers.   normocephalic, head atraumatic  Skin:   warm, dry  Psychiatric:  judgement and insight appropriate  normal mood and affect      Result Review :     The following data was reviewed by: JENA Willis on 10/17/2023:    CMP          5/2/2023    11:06 5/12/2023    04:17 5/31/2023    13:14   CMP   Glucose 127  158  91    BUN 13  14  11    Creatinine 0.71  0.79  0.62    EGFR 104.4  91.8  109.3    Sodium 141  137  141    Potassium 3.5  2.9  4.1    Chloride 104  101  102    Calcium 8.9  8.5  9.5    Total Protein 6.9   7.5    Albumin 3.8   3.9    Globulin 3.1   3.6    Total Bilirubin 0.3   0.3    Alkaline Phosphatase 92   136    AST (SGOT) 15   15    ALT (SGPT) 15   16    Albumin/Globulin Ratio 1.2   1.1    BUN/Creatinine Ratio 18.3  17.7  17.7    Anion Gap 10.0  9.9  12.1      CBC          5/11/2023    11:23 5/12/2023    04:17 5/31/2023    12:05   CBC   WBC   5.22    RBC   4.26    Hemoglobin 11.4  9.8  12.4    Hematocrit 36.4  30.5  39.1    MCV   91.8    MCH   29.1    MCHC   31.7    RDW   16.4    Platelets   451                   ECG 12 Lead    Date/Time: 10/17/2023 3:16 PM  Performed by: Marce Graham APRN    Authorized by: NNAMDI Hill MD  Rhythm: sinus rhythm  Rate: normal  BPM: " 80  Conduction: conduction normal  ST Segments: ST segments normal  QRS axis: normal  Other findings: non-specific ST-T wave changes    Clinical impression: non-specific ECG            Casi Lopez  reports that she has never smoked. She has never used smokeless tobacco.. I have educated her on the risk of diseases from using tobacco products such as cancer, COPD, and heart disease.                Assessment and Plan        ASSESSMENT:  Encounter Diagnoses   Name Primary?    Syncope and collapse Yes    Essential hypertension     ASD (atrial septal defect)          PLAN:    1.  Syncope and collapse-Ms. Lopez has had 3 syncopal episodes over the last 3 weeks or so.  Each time she has no warning signs or symptoms.  We will do a 30-day event monitor to rule out conduction disease.  We will also get an echocardiogram to rule out structural abnormalities.  Normal coronary arteries per cardiac catheterization 2021.  2.  Essential hypertension-controlled, continue current medical therapy.  3.  Atrial septal defect-postsurgical repair in 1980.    Follow-up to be determined.      Patient was given instructions and counseling regarding her condition or for health maintenance advice. Please see specific information pulled into the AVS if appropriate.           Marce Graham, APRN   10/17/2023  14:14 EDT

## 2023-10-24 ENCOUNTER — TELEPHONE (OUTPATIENT)
Dept: CARDIOLOGY | Facility: CLINIC | Age: 48
End: 2023-10-24
Payer: COMMERCIAL

## 2023-10-24 NOTE — TELEPHONE ENCOUNTER
----- Message from JENA Willis sent at 10/24/2023  1:50 PM EDT -----  Echo reviewed.  Heart function is mildly reduced.  Minor stiffening of the heart most likely related to high blood pressure.  We will follow-up after monitor is completed.

## 2023-11-06 ENCOUNTER — OFFICE VISIT (OUTPATIENT)
Dept: ORTHOPEDIC SURGERY | Facility: CLINIC | Age: 48
End: 2023-11-06
Payer: COMMERCIAL

## 2023-11-06 VITALS — HEART RATE: 80 BPM | DIASTOLIC BLOOD PRESSURE: 91 MMHG | OXYGEN SATURATION: 99 % | SYSTOLIC BLOOD PRESSURE: 150 MMHG

## 2023-11-06 DIAGNOSIS — M25.551 RIGHT HIP PAIN: ICD-10-CM

## 2023-11-06 DIAGNOSIS — Z96.641 STATUS POST TOTAL REPLACEMENT OF RIGHT HIP: Primary | ICD-10-CM

## 2023-11-06 PROCEDURE — 3077F SYST BP >= 140 MM HG: CPT | Performed by: PHYSICIAN ASSISTANT

## 2023-11-06 PROCEDURE — 3080F DIAST BP >= 90 MM HG: CPT | Performed by: PHYSICIAN ASSISTANT

## 2023-11-06 PROCEDURE — 99213 OFFICE O/P EST LOW 20 MIN: CPT | Performed by: PHYSICIAN ASSISTANT

## 2023-11-06 NOTE — PROGRESS NOTES
Chief Complaint  Follow-up of the Right Hip    Subjective          Casi Lopez presents to [facility] ORTHOPEDICS   History of Present Illness    Casi Lopez presents today for a follow-up of her right hip.  Patient is 6-month postop right total hip arthroplasty performed on 5/11/2023.  Today, patient states that she is doing fine.  She states that she has been working a lot lately and experiences some pain to the hip with increase in activity.  She states that she has good hip range of motion.  She does feel that her hip is getting stronger and stable.  Patient reports no new concerns.      No Known Allergies     Social History     Socioeconomic History    Marital status:    Tobacco Use    Smoking status: Never    Smokeless tobacco: Never   Vaping Use    Vaping Use: Never used   Substance and Sexual Activity    Alcohol use: Never    Drug use: Never    Sexual activity: Defer        I reviewed the patient's chief complaint, history of present illness, review of systems, past medical history, surgical history, family history, social history, medications, and allergy list.     REVIEW OF SYSTEMS    Constitutional: Denies fevers, chills, weight loss  Cardiovascular: Denies chest pain, shortness of breath  Skin: Denies rashes, acute skin changes  Neurologic: Denies headache, loss of consciousness  MSK: Right hip pain      Objective   Vital Signs:   /91   Pulse 80   SpO2 99%     There is no height or weight on file to calculate BMI.    Physical Exam    General: Alert. No acute distress.   Right lower extremity: Hip flexion intact.  5 and 5 hip abduction.  No pain with passive hip range of motion.  No pain with passive logroll the hip.  Knee extensor mechanism intact.  Calf soft, nontender.  Demonstrates active ankle plantarflexion and dorsiflexion.  Sensation intact over dorsal and plantar foot.  Palpable pedal pulses.    Procedures    Imaging Results (Most Recent)       Procedure  Component Value Units Date/Time    XR Hip With or Without Pelvis 2 - 3 View Right [[id number]] Resulted: 11/06/23 1553     Updated: 11/06/23 1554    Narrative:      Indications: Follow-up right total hip arthroplasty    Views: AP and frog-lateral right hip    Findings: Press-fit right total hip arthroplasty implants in place with   stable positioning.  No hardware complications or loosening.  Hip is   reduced.  No periprosthetic fractures.    Comparative Data: Comparative data found and reviewed today.                     Assessment and Plan    Diagnoses and all orders for this visit:    1. Status post total replacement of right hip (Primary)    2. Right hip pain  -     XR Hip With or Without Pelvis 2 - 3 View Right        Casi Lopez presents today 6 months postop right total hip arthroplasty performed on 5/11/2023.  X-rays reviewed with the patient today.  Patient structured to continue with her home exercises for both range of motion and strength.  Continue to progress with activities as tolerated.  We discussed antibiotic prophylaxis for dental procedures.  Patient expressed understanding and agreed.      Patient will follow up in 6 months for 1 year postop reevaluation.  We will obtain new x-rays of the right hip at next visit.      Call or return if symptoms worsen or patient has any concerns.       Follow Up   Return in about 6 months (around 5/6/2024).  Patient was given instructions and counseling regarding her condition or for health maintenance advice. Please see specific information pulled into the AVS if appropriate.     Luci Guaman PA-C  11/06/23  15:57 EST

## 2023-11-13 ENCOUNTER — TELEPHONE (OUTPATIENT)
Dept: CARDIOLOGY | Facility: CLINIC | Age: 48
End: 2023-11-13
Payer: COMMERCIAL

## 2023-11-13 NOTE — TELEPHONE ENCOUNTER
Spoke to pt who said she now has blisters where the electrodes are placed.      Second call to pt, I let her know that Josefina said to try the sensitive electrodes.  Sending pt to [place] to pick those up along with some Aquaphor.     Pt voiced understanding.

## 2023-11-13 NOTE — TELEPHONE ENCOUNTER
Caller: Casi Lopez    Relationship: Self    Best call back number: [phone] (home)      What is the best time to reach you: ANYTIME     Who are you requesting to speak with (clinical staff, provider,  specific staff member): CLINICAL    What was the call regarding: PT CALLED IN STATING SHE NEEDS MORE OF THE ELECTRO STRIPS SHE PLACES ON HER CHEST, PT ALSO STATES THEY HAVE BEEN BREAKING HER OUT WHEN WEARING THEM, SHE GETS BUMPS/ BLISTERS AROUND THE AREA. PT IS NOT SURE IF IT'S FROM WEARING THOSE OR POSSIBLY SOMETHING ELSE, PLEASE CALL PT BACK IN REGARDS TO THIS.    Is it okay if the provider responds through MyChart: CALL BACK

## 2023-11-27 ENCOUNTER — TELEPHONE (OUTPATIENT)
Dept: CARDIOLOGY | Facility: CLINIC | Age: 48
End: 2023-11-27
Payer: COMMERCIAL

## 2023-11-27 NOTE — TELEPHONE ENCOUNTER
----- Message from JENA Willis sent at 11/27/2023  8:56 AM EST -----  Event monitor reviewed.  No significant abnormal heart rhythms noted.  Overall a regular rhythm.  I will need to see her for follow-up.  Please either place her on my Thursday morning schedule or Tuesday or Thursday next week for further discussion.

## 2023-12-07 ENCOUNTER — OFFICE VISIT (OUTPATIENT)
Dept: CARDIOLOGY | Facility: CLINIC | Age: 48
End: 2023-12-07
Payer: COMMERCIAL

## 2023-12-07 VITALS
DIASTOLIC BLOOD PRESSURE: 108 MMHG | BODY MASS INDEX: 28.88 KG/M2 | SYSTOLIC BLOOD PRESSURE: 147 MMHG | HEART RATE: 88 BPM | WEIGHT: 184 LBS | HEIGHT: 67 IN

## 2023-12-07 DIAGNOSIS — R55 SYNCOPE AND COLLAPSE: Primary | ICD-10-CM

## 2023-12-07 DIAGNOSIS — I10 ESSENTIAL HYPERTENSION: ICD-10-CM

## 2023-12-07 DIAGNOSIS — Q21.10 ASD (ATRIAL SEPTAL DEFECT): ICD-10-CM

## 2023-12-07 RX ORDER — CARVEDILOL 12.5 MG/1
12.5 TABLET ORAL 2 TIMES DAILY
Qty: 180 TABLET | Refills: 3 | Status: SHIPPED | OUTPATIENT
Start: 2023-12-07

## 2023-12-07 NOTE — PROGRESS NOTES
"Chief Complaint  Loss of Consciousness, Hypertension, and Follow-up (Fu -still lightheaded, feeling like going to pass out but doesn't )    Subjective            Casi Lopez presents to [facility] CARDIOLOGY  History of Present Illness    Ms. Lopez is here for follow-up evaluation management of essential hypertension, mixed hyperlipidemia, syncope.  She had 3 episodes of syncope without warning signs.  No associated symptoms.  From that point we did a 30-day monitor however she had no syncope while wearing the monitor.  She did have some intermittent dizziness during the monitoring period and this correlated to a sinus mechanism.  Her average heart rate was 104.  Since then she has had no further syncope but reports intermittent dizziness with position changes.  Echocardiogram with no structural abnormalities.  Coronary angiography in 2021 was normal.    PMH  Past Medical History:   Diagnosis Date    Acid reflux     Anxiety     Arthritis     Asthma     HAS INHALERS SCHEDULED AND PRN    Depression     Diabetes mellitus     AVERAGE AM 'S    H/O precordial chest pain     FOLLOWED BY DR HILL. DENIES CP/SOA. REPORTS WORKS FULL TIME IN [place].    Hyperlipidemia     Hypertension     Injury of back     Migraines     Pain management     [facility]    Sleep apnea     REPORTS IS SUPPOSED TO BE RECEIVING CPAP         SURGICALHX  Past Surgical History:   Procedure Laterality Date    CARDIAC CATHETERIZATION N/A 08/12/2021    Procedure: Left Heart Cath - R radial;  Surgeon: NNAMDI Hill MD;  Location: [facility] INVASIVE LOCATION;  Service: Cardiology;  Laterality: N/A;    CARDIAC SURGERY      \"repair of hole in heart\" HAD REPAIR OF ATRIAL-SEPTAL DEFECT IN 1980    HYSTERECTOMY      LAPAROSCOPIC TUBAL LIGATION      TOTAL HIP ARTHROPLASTY Right 5/11/2023    Procedure: RIGHT TOTAL HIP ARTHROPLASTY ANTERIOR;  Surgeon: Elias Gruber MD;  Location: [facility] OR;  Service: Orthopedics;  " Laterality: Right;        SOC  Social History     Socioeconomic History    Marital status:    Tobacco Use    Smoking status: Never    Smokeless tobacco: Never   Vaping Use    Vaping Use: Never used   Substance and Sexual Activity    Alcohol use: Never    Drug use: Never    Sexual activity: Defer         FAMHX  Family History   Problem Relation Age of Onset    Diabetes Mother     Hypertension Mother     Hypertension Father     Diabetes Father           ALLERGY  No Known Allergies     MEDSCURRENT    Current Outpatient Medications:     albuterol sulfate  (90 Base) MCG/ACT inhaler, Inhale 2 puffs Every 4 (Four) Hours As Needed., Disp: , Rfl:     amantadine (SYMMETREL) 100 MG tablet, Take 1 tablet by mouth Daily., Disp: , Rfl:     amitriptyline (ELAVIL) 50 MG tablet, Take 1 tablet by mouth Every Night., Disp: , Rfl:     ARIPiprazole (ABILIFY) 10 MG tablet, Take 1 tablet by mouth Every Night., Disp: , Rfl:     aspirin 325 MG EC tablet, Take 1 tablet by mouth Every 12 (Twelve) Hours., Disp: , Rfl:     atorvastatin (LIPITOR) 20 MG tablet, Take 1 tablet by mouth every night at bedtime., Disp: , Rfl:     Calcium 600+D3 600-20 MG-MCG tablet, Take 1 tablet by mouth Daily., Disp: , Rfl:     carBAMazepine (CARBATROL) 100 MG 12 hr capsule, TAKE 1 CAPSULE BY MOUTH TWICE DAILY X 2 WEEKS THEN TAKE 1 CAPSULE BY MOUTH EVERY DAY X 1 WEEK THEN STOP., Disp: , Rfl:     carvedilol (COREG) 12.5 MG tablet, Take 1 tablet by mouth 2 (Two) Times a Day., Disp: 180 tablet, Rfl: 3    Cholecalciferol (Vitamin D3) 1.25 MG (13911 UT) capsule, Take 1 capsule by mouth 1 (One) Time Per Week. TAKES ON FRIDAY, Disp: , Rfl:     diclofenac (VOLTAREN) 50 MG EC tablet, Take 1 tablet by mouth 2 (Two) Times a Day., Disp: 60 tablet, Rfl: 0    escitalopram (LEXAPRO) 10 MG tablet, Take 1 tablet by mouth Daily., Disp: , Rfl:     gabapentin (NEURONTIN) 600 MG tablet, Every 8 (Eight) Hours., Disp: , Rfl:     hydrOXYzine (ATARAX) 50 MG tablet, TAKE 1/2 TO  1 TABLET BY MOUTH EVERY 6 HOURS AS NEEDED FOR ITCHING, Disp: , Rfl:     Jardiance 10 MG tablet tablet, 1 tablet Daily. INSTRUCTED PER ANESTHESIA PROTOCOL, Disp: , Rfl:     loratadine (CLARITIN) 10 MG tablet, Take 1 tablet by mouth Daily., Disp: , Rfl:     melatonin 5 MG tablet tablet, Take 1 tablet by mouth., Disp: , Rfl:     metFORMIN (GLUCOPHAGE) 500 MG tablet, Take 1 tablet by mouth 2 (Two) Times a Day., Disp: , Rfl:     methocarbamol (ROBAXIN) 750 MG tablet, Take 1 tablet by mouth 3 (Three) Times a Day., Disp: , Rfl:     montelukast (SINGULAIR) 10 MG tablet, Take 1 tablet by mouth Every Night., Disp: , Rfl:     naloxone (NARCAN) 4 MG/0.1ML nasal spray, Call 911. Don't prime. [place] in 1 nostril for overdose. Repeat in 2-3 minutes in other nostril if no or minimal breathing/responsiveness., Disp: 2 each, Rfl: 0    omeprazole (priLOSEC) 20 MG capsule, Take 1 capsule by mouth Daily., Disp: , Rfl:     ondansetron ODT (ZOFRAN-ODT) 4 MG disintegrating tablet, 1 tablet Every 8 (Eight) Hours As Needed for Nausea or Vomiting., Disp: , Rfl:     oxyCODONE-acetaminophen (PERCOCET) 5-325 MG per tablet, Take 1 tablet by mouth 3 (Three) Times a Day., Disp: , Rfl:     potassium chloride 10 MEQ CR tablet, Take 2 tablets by mouth 2 (Two) Times a Day., Disp: 180 tablet, Rfl: 3    prazosin (MINIPRESS) 1 MG capsule, Take 1 capsule by mouth Every Night., Disp: , Rfl:     prazosin (MINIPRESS) 2 MG capsule, Take 1 capsule by mouth Every Night., Disp: , Rfl:     QUEtiapine (SEROquel) 100 MG tablet, Take 1 tablet by mouth Every Night., Disp: , Rfl:     Symbicort 80-4.5 MCG/ACT inhaler, Inhale 2 puffs 2 (Two) Times a Day., Disp: , Rfl:     Vitamin E 180 MG (400 UNIT) capsule capsule, Take 1 capsule by mouth Daily., Disp: , Rfl:     sennosides-docusate (PERICOLACE) 8.6-50 MG per tablet, Take 2 tablets by mouth 2 (Two) Times a Day. (Patient not taking: Reported on 11/6/2023), Disp: 20 tablet, Rfl: 0      Review of Systems   Cardiovascular:   "Positive for syncope. Negative for chest pain, dyspnea on exertion, irregular heartbeat and palpitations.   Respiratory:  Negative for shortness of breath.    Neurological:  Positive for dizziness.        Objective     BP (!) 147/108   Pulse 88   Ht 170.2 cm (67\")   Wt 83.5 kg (184 lb)   BMI 28.82 kg/m²       General Appearance:   well developed  well nourished  HENT:   oropharynx moist  lips not cyanotic  Neck:  thyroid not enlarged  supple  Respiratory:  no respiratory distress  normal breath sounds  no rales  Cardiovascular:  no jugular venous distention  regular rhythm  apical impulse normal  S1 normal, S2 normal  no S3, no S4   no murmur  no rub, no thrill  carotid pulses normal; no bruit  pedal pulses normal  lower extremity edema: none    Musculoskeletal:  no clubbing of fingers.   normocephalic, head atraumatic  Skin:   warm, dry  Psychiatric:  judgement and insight appropriate  normal mood and affect      Result Review :     The following data was reviewed by: JENA Willis on 12/07/2023:    CMP          5/2/2023    11:06 5/12/2023    04:17 5/31/2023    13:14   CMP   Glucose 127  158  91    BUN 13  14  11    Creatinine 0.71  0.79  0.62    EGFR 104.4  91.8  109.3    Sodium 141  137  141    Potassium 3.5  2.9  4.1    Chloride 104  101  102    Calcium 8.9  8.5  9.5    Total Protein 6.9   7.5    Albumin 3.8   3.9    Globulin 3.1   3.6    Total Bilirubin 0.3   0.3    Alkaline Phosphatase 92   136    AST (SGOT) 15   15    ALT (SGPT) 15   16    Albumin/Globulin Ratio 1.2   1.1    BUN/Creatinine Ratio 18.3  17.7  17.7    Anion Gap 10.0  9.9  12.1      CBC          5/11/2023    11:23 5/12/2023    04:17 5/31/2023    12:05   CBC   WBC   5.22    RBC   4.26    Hemoglobin 11.4  9.8  12.4    Hematocrit 36.4  30.5  39.1    MCV   91.8    MCH   29.1    MCHC   31.7    RDW   16.4    Platelets   451                 Procedures      Casi Lopez  reports that she has never smoked. She has never used smokeless " tobacco.. I have educated her on the risk of diseases from using tobacco products such as cancer, COPD, and heart disease.                   Assessment and Plan        ASSESSMENT:  Encounter Diagnoses   Name Primary?    Syncope and collapse Yes    Essential hypertension     ASD (atrial septal defect)          PLAN:    1.  Syncope and collapse-30-day monitor without any significant arrhythmias.  However she did not have syncope while wearing the monitor.  She is having some positional dizziness.  Have asked her to increase her water intake.  If she has any recurrence of syncope we discussed a longer-term monitoring using loop recorder.  She is agreeable to do so.  Echo without significant structural normalities.  Coronary angiography previously normal.  2.  Essential hypertension-uncontrolled.  Also she reports occasional subjective tachycardia.  Average heart rate on Holter monitor was 104.  Increase carvedilol to 12.5 mg twice daily.  3.  Atrial septal defect postsurgical repair in 1980.    Follow-up in 2 weeks.      Patient was given instructions and counseling regarding her condition or for health maintenance advice. Please see specific information pulled into the AVS if appropriate.           Marce Graham, APRN   12/7/2023  10:18 EST

## 2023-12-21 ENCOUNTER — OFFICE VISIT (OUTPATIENT)
Dept: CARDIOLOGY | Facility: CLINIC | Age: 48
End: 2023-12-21
Payer: COMMERCIAL

## 2023-12-21 VITALS
BODY MASS INDEX: 29.35 KG/M2 | HEIGHT: 67 IN | SYSTOLIC BLOOD PRESSURE: 155 MMHG | DIASTOLIC BLOOD PRESSURE: 92 MMHG | WEIGHT: 187 LBS | HEART RATE: 87 BPM

## 2023-12-21 DIAGNOSIS — R55 SYNCOPE AND COLLAPSE: Primary | ICD-10-CM

## 2023-12-21 DIAGNOSIS — Q21.10 ASD (ATRIAL SEPTAL DEFECT): ICD-10-CM

## 2023-12-21 DIAGNOSIS — I10 ESSENTIAL HYPERTENSION: ICD-10-CM

## 2023-12-21 RX ORDER — HYDROCHLOROTHIAZIDE 25 MG/1
25 TABLET ORAL DAILY
Qty: 90 TABLET | Refills: 3 | Status: SHIPPED | OUTPATIENT
Start: 2023-12-21

## 2023-12-21 NOTE — H&P (VIEW-ONLY)
"Chief Complaint  Loss of Consciousness and Follow-up (2 week - has felt like she was going to pass out at least 3 times in last 2 weeks, but reports she hasn't )    Subjective            Casi Lopez presents to [facility] CARDIOLOGY  History of Present Illness    Ms. Lopez is here for follow-up evaluation management of essential hypertension, mixed hyperlipidemia, and syncope.  She has had 3 episodes of syncope over the past few months without warning signs.  No associated symptoms.  She recently had a 30-day monitor however had no syncope or significant symptoms while wearing the monitor.  She today reports continued presyncope.  She has had an echocardiogram with no structural abnormalities.  Coronary angiography in 2021 was normal.  She denies chest pain or excessive shortness of breath.  Her blood pressure has been uncontrolled.    PMH  Past Medical History:   Diagnosis Date    Acid reflux     Anxiety     Arthritis     Asthma     HAS INHALERS SCHEDULED AND PRN    Depression     Diabetes mellitus     AVERAGE AM 'S    H/O precordial chest pain     FOLLOWED BY DR HILL. DENIES CP/SOA. REPORTS [place] FULL TIME [place]    Hyperlipidemia     Hypertension     Injury of back     Migraines     Pain management     [facility]    Sleep apnea     REPORTS IS SUPPOSED TO BE RECEIVING CPAP         SURGICALHX  Past Surgical History:   Procedure Laterality Date    CARDIAC CATHETERIZATION N/A 08/12/2021    Procedure: Left Heart Cath - R radial;  Surgeon: NNAMDI Hill MD;  Location: [facility] INVASIVE LOCATION;  Service: Cardiology;  Laterality: N/A;    CARDIAC SURGERY      \"repair of hole in heart\" HAD REPAIR OF ATRIAL-SEPTAL DEFECT IN 1980    HYSTERECTOMY      LAPAROSCOPIC TUBAL LIGATION      TOTAL HIP ARTHROPLASTY Right 5/11/2023    Procedure: RIGHT TOTAL HIP ARTHROPLASTY ANTERIOR;  Surgeon: Elias Gruber MD;  Location: [facility] OR;  Service: Orthopedics;  Laterality: Right;    "     SOC  Social History     Socioeconomic History    Marital status:    Tobacco Use    Smoking status: Never    Smokeless tobacco: Never   Vaping Use    Vaping Use: Never used   Substance and Sexual Activity    Alcohol use: Never    Drug use: Never    Sexual activity: Defer         FAMHX  Family History   Problem Relation Age of Onset    Diabetes Mother     Hypertension Mother     Hypertension Father     Diabetes Father           ALLERGY  No Known Allergies     MEDSCURRENT    Current Outpatient Medications:     albuterol sulfate  (90 Base) MCG/ACT inhaler, Inhale 2 puffs Every 4 (Four) Hours As Needed., Disp: , Rfl:     amantadine (SYMMETREL) 100 MG tablet, Take 1 tablet by mouth Daily., Disp: , Rfl:     amitriptyline (ELAVIL) 50 MG tablet, Take 1 tablet by mouth Every Night., Disp: , Rfl:     ARIPiprazole (ABILIFY) 10 MG tablet, Take 1 tablet by mouth Every Night., Disp: , Rfl:     aspirin 325 MG EC tablet, Take 1 tablet by mouth Every 12 (Twelve) Hours., Disp: , Rfl:     atorvastatin (LIPITOR) 20 MG tablet, Take 1 tablet by mouth every night at bedtime., Disp: , Rfl:     Calcium 600+D3 600-20 MG-MCG tablet, Take 1 tablet by mouth Daily., Disp: , Rfl:     carBAMazepine (CARBATROL) 100 MG 12 hr capsule, TAKE 1 CAPSULE BY MOUTH TWICE DAILY X 2 WEEKS THEN TAKE 1 CAPSULE BY MOUTH EVERY DAY X 1 WEEK THEN STOP., Disp: , Rfl:     carvedilol (COREG) 12.5 MG tablet, Take 1 tablet by mouth 2 (Two) Times a Day., Disp: 180 tablet, Rfl: 3    Cholecalciferol (Vitamin D3) 1.25 MG (34302 UT) capsule, Take 1 capsule by mouth 1 (One) Time Per Week. TAKES ON FRIDAY, Disp: , Rfl:     diclofenac (VOLTAREN) 50 MG EC tablet, Take 1 tablet by mouth 2 (Two) Times a Day., Disp: 60 tablet, Rfl: 0    escitalopram (LEXAPRO) 10 MG tablet, Take 1 tablet by mouth Daily., Disp: , Rfl:     gabapentin (NEURONTIN) 600 MG tablet, Every 8 (Eight) Hours., Disp: , Rfl:     hydrOXYzine (ATARAX) 50 MG tablet, TAKE 1/2 TO 1 TABLET BY MOUTH  EVERY 6 HOURS AS NEEDED FOR ITCHING, Disp: , Rfl:     Jardiance 10 MG tablet tablet, 1 tablet Daily. INSTRUCTED PER ANESTHESIA PROTOCOL, Disp: , Rfl:     loratadine (CLARITIN) 10 MG tablet, Take 1 tablet by mouth Daily., Disp: , Rfl:     melatonin 5 MG tablet tablet, Take 1 tablet by mouth., Disp: , Rfl:     metFORMIN (GLUCOPHAGE) 500 MG tablet, Take 1 tablet by mouth 2 (Two) Times a Day., Disp: , Rfl:     methocarbamol (ROBAXIN) 750 MG tablet, Take 1 tablet by mouth 3 (Three) Times a Day., Disp: , Rfl:     montelukast (SINGULAIR) 10 MG tablet, Take 1 tablet by mouth Every Night., Disp: , Rfl:     naloxone (NARCAN) 4 MG/0.1ML nasal spray, Call 911. Don't prime. [place] in 1 nostril for overdose. Repeat in 2-3 minutes in other nostril if no or minimal breathing/responsiveness., Disp: 2 each, Rfl: 0    omeprazole (priLOSEC) 20 MG capsule, Take 1 capsule by mouth Daily., Disp: , Rfl:     ondansetron ODT (ZOFRAN-ODT) 4 MG disintegrating tablet, 1 tablet Every 8 (Eight) Hours As Needed for Nausea or Vomiting., Disp: , Rfl:     oxyCODONE-acetaminophen (PERCOCET) 5-325 MG per tablet, Take 1 tablet by mouth 3 (Three) Times a Day., Disp: , Rfl:     potassium chloride 10 MEQ CR tablet, Take 2 tablets by mouth 2 (Two) Times a Day., Disp: 180 tablet, Rfl: 3    prazosin (MINIPRESS) 1 MG capsule, Take 1 capsule by mouth Every Night., Disp: , Rfl:     prazosin (MINIPRESS) 2 MG capsule, Take 1 capsule by mouth Every Night., Disp: , Rfl:     QUEtiapine (SEROquel) 100 MG tablet, Take 1 tablet by mouth Every Night., Disp: , Rfl:     Symbicort 80-4.5 MCG/ACT inhaler, Inhale 2 puffs 2 (Two) Times a Day., Disp: , Rfl:     Vitamin E 180 MG (400 UNIT) capsule capsule, Take 1 capsule by mouth Daily., Disp: , Rfl:     hydroCHLOROthiazide (HYDRODIURIL) 25 MG tablet, Take 1 tablet by mouth Daily., Disp: 90 tablet, Rfl: 3    sennosides-docusate (PERICOLACE) 8.6-50 MG per tablet, Take 2 tablets by mouth 2 (Two) Times a Day. (Patient not taking:  "Reported on 11/6/2023), Disp: 20 tablet, Rfl: 0      Review of Systems   Constitutional: Negative for malaise/fatigue.   Cardiovascular:  Positive for near-syncope and syncope. Negative for chest pain, dyspnea on exertion, irregular heartbeat and palpitations.   Respiratory:  Negative for shortness of breath.    Neurological:  Positive for dizziness.        Objective     /92   Pulse 87   Ht 170.2 cm (67\")   Wt 84.8 kg (187 lb)   BMI 29.29 kg/m²       General Appearance:   well developed  well nourished  HENT:   oropharynx moist  lips not cyanotic  Neck:  thyroid not enlarged  supple  Respiratory:  no respiratory distress  normal breath sounds  no rales  Cardiovascular:  no jugular venous distention  regular rhythm  apical impulse normal  S1 normal, S2 normal  no S3, no S4   no murmur  no rub, no thrill  carotid pulses normal; no bruit  pedal pulses normal  lower extremity edema: none    Musculoskeletal:  no clubbing of fingers.   normocephalic, head atraumatic  Skin:   warm, dry  Psychiatric:  judgement and insight appropriate  normal mood and affect      Result Review :     The following data was reviewed by: JENA Willis on 12/21/2023:    CMP          5/2/2023    11:06 5/12/2023    04:17 5/31/2023    13:14   CMP   Glucose 127  158  91    BUN 13  14  11    Creatinine 0.71  0.79  0.62    EGFR 104.4  91.8  109.3    Sodium 141  137  141    Potassium 3.5  2.9  4.1    Chloride 104  101  102    Calcium 8.9  8.5  9.5    Total Protein 6.9   7.5    Albumin 3.8   3.9    Globulin 3.1   3.6    Total Bilirubin 0.3   0.3    Alkaline Phosphatase 92   136    AST (SGOT) 15   15    ALT (SGPT) 15   16    Albumin/Globulin Ratio 1.2   1.1    BUN/Creatinine Ratio 18.3  17.7  17.7    Anion Gap 10.0  9.9  12.1      CBC          5/11/2023    11:23 5/12/2023    04:17 5/31/2023    12:05   CBC   WBC   5.22    RBC   4.26    Hemoglobin 11.4  9.8  12.4    Hematocrit 36.4  30.5  39.1    MCV   91.8    MCH   29.1    MCHC   " 31.7    RDW   16.4    Platelets   451            Data reviewed : Cardiology studies as above      Procedures      Casi Lopez  reports that she has never smoked. She has never used smokeless tobacco.. I have educated her on the risk of diseases from using tobacco products such as cancer, COPD, and heart disease.              Assessment and Plan        ASSESSMENT:  Encounter Diagnoses   Name Primary?    Syncope and collapse Yes    Essential hypertension     ASD (atrial septal defect)          PLAN:    1.  Syncope and collapse-30-day monitor without any significant arrhythmias however she did not have any presyncope or syncope while wearing the monitor.  I feel she would benefit from a longer-term monitoring device using implanted loop recorder.  Procedure discussed with patient and she is agreeable to proceed.  Previous echocardiogram showed no structural abnormalities and coronary angiography was previously normal.  2.  Essential hypertension-add hydrochlorothiazide.  BMP in 2 weeks.  3.  Atrial septal defect postsurgical repair in 1980.    Follow-up after loop recorder insertion      Patient was given instructions and counseling regarding her condition or for health maintenance advice. Please see specific information pulled into the AVS if appropriate.           Marce Graham, APRN   12/21/2023  09:25 EST

## 2023-12-21 NOTE — PROGRESS NOTES
"Chief Complaint  Loss of Consciousness and Follow-up (2 week - has felt like she was going to pass out at least 3 times in last 2 weeks, but reports she hasn't )    Subjective            Casi Lopez presents to [facility] CARDIOLOGY  History of Present Illness    Ms. Lopez is here for follow-up evaluation management of essential hypertension, mixed hyperlipidemia, and syncope.  She has had 3 episodes of syncope over the past few months without warning signs.  No associated symptoms.  She recently had a 30-day monitor however had no syncope or significant symptoms while wearing the monitor.  She today reports continued presyncope.  She has had an echocardiogram with no structural abnormalities.  Coronary angiography in 2021 was normal.  She denies chest pain or excessive shortness of breath.  Her blood pressure has been uncontrolled.    PMH  Past Medical History:   Diagnosis Date    Acid reflux     Anxiety     Arthritis     Asthma     HAS INHALERS SCHEDULED AND PRN    Depression     Diabetes mellitus     AVERAGE AM 'S    H/O precordial chest pain     FOLLOWED BY DR HILL. DENIES CP/SOA. REPORTS [place] FULL TIME [place]    Hyperlipidemia     Hypertension     Injury of back     Migraines     Pain management     [facility]    Sleep apnea     REPORTS IS SUPPOSED TO BE RECEIVING CPAP         SURGICALHX  Past Surgical History:   Procedure Laterality Date    CARDIAC CATHETERIZATION N/A 08/12/2021    Procedure: Left Heart Cath - R radial;  Surgeon: NNAMDI Hill MD;  Location: [facility] INVASIVE LOCATION;  Service: Cardiology;  Laterality: N/A;    CARDIAC SURGERY      \"repair of hole in heart\" HAD REPAIR OF ATRIAL-SEPTAL DEFECT IN 1980    HYSTERECTOMY      LAPAROSCOPIC TUBAL LIGATION      TOTAL HIP ARTHROPLASTY Right 5/11/2023    Procedure: RIGHT TOTAL HIP ARTHROPLASTY ANTERIOR;  Surgeon: Elias Gruber MD;  Location: [facility] OR;  Service: Orthopedics;  Laterality: Right;    "     SOC  Social History     Socioeconomic History    Marital status:    Tobacco Use    Smoking status: Never    Smokeless tobacco: Never   Vaping Use    Vaping Use: Never used   Substance and Sexual Activity    Alcohol use: Never    Drug use: Never    Sexual activity: Defer         FAMHX  Family History   Problem Relation Age of Onset    Diabetes Mother     Hypertension Mother     Hypertension Father     Diabetes Father           ALLERGY  No Known Allergies     MEDSCURRENT    Current Outpatient Medications:     albuterol sulfate  (90 Base) MCG/ACT inhaler, Inhale 2 puffs Every 4 (Four) Hours As Needed., Disp: , Rfl:     amantadine (SYMMETREL) 100 MG tablet, Take 1 tablet by mouth Daily., Disp: , Rfl:     amitriptyline (ELAVIL) 50 MG tablet, Take 1 tablet by mouth Every Night., Disp: , Rfl:     ARIPiprazole (ABILIFY) 10 MG tablet, Take 1 tablet by mouth Every Night., Disp: , Rfl:     aspirin 325 MG EC tablet, Take 1 tablet by mouth Every 12 (Twelve) Hours., Disp: , Rfl:     atorvastatin (LIPITOR) 20 MG tablet, Take 1 tablet by mouth every night at bedtime., Disp: , Rfl:     Calcium 600+D3 600-20 MG-MCG tablet, Take 1 tablet by mouth Daily., Disp: , Rfl:     carBAMazepine (CARBATROL) 100 MG 12 hr capsule, TAKE 1 CAPSULE BY MOUTH TWICE DAILY X 2 WEEKS THEN TAKE 1 CAPSULE BY MOUTH EVERY DAY X 1 WEEK THEN STOP., Disp: , Rfl:     carvedilol (COREG) 12.5 MG tablet, Take 1 tablet by mouth 2 (Two) Times a Day., Disp: 180 tablet, Rfl: 3    Cholecalciferol (Vitamin D3) 1.25 MG (24256 UT) capsule, Take 1 capsule by mouth 1 (One) Time Per Week. TAKES ON FRIDAY, Disp: , Rfl:     diclofenac (VOLTAREN) 50 MG EC tablet, Take 1 tablet by mouth 2 (Two) Times a Day., Disp: 60 tablet, Rfl: 0    escitalopram (LEXAPRO) 10 MG tablet, Take 1 tablet by mouth Daily., Disp: , Rfl:     gabapentin (NEURONTIN) 600 MG tablet, Every 8 (Eight) Hours., Disp: , Rfl:     hydrOXYzine (ATARAX) 50 MG tablet, TAKE 1/2 TO 1 TABLET BY MOUTH  EVERY 6 HOURS AS NEEDED FOR ITCHING, Disp: , Rfl:     Jardiance 10 MG tablet tablet, 1 tablet Daily. INSTRUCTED PER ANESTHESIA PROTOCOL, Disp: , Rfl:     loratadine (CLARITIN) 10 MG tablet, Take 1 tablet by mouth Daily., Disp: , Rfl:     melatonin 5 MG tablet tablet, Take 1 tablet by mouth., Disp: , Rfl:     metFORMIN (GLUCOPHAGE) 500 MG tablet, Take 1 tablet by mouth 2 (Two) Times a Day., Disp: , Rfl:     methocarbamol (ROBAXIN) 750 MG tablet, Take 1 tablet by mouth 3 (Three) Times a Day., Disp: , Rfl:     montelukast (SINGULAIR) 10 MG tablet, Take 1 tablet by mouth Every Night., Disp: , Rfl:     naloxone (NARCAN) 4 MG/0.1ML nasal spray, Call 911. Don't prime. [place] in 1 nostril for overdose. Repeat in 2-3 minutes in other nostril if no or minimal breathing/responsiveness., Disp: 2 each, Rfl: 0    omeprazole (priLOSEC) 20 MG capsule, Take 1 capsule by mouth Daily., Disp: , Rfl:     ondansetron ODT (ZOFRAN-ODT) 4 MG disintegrating tablet, 1 tablet Every 8 (Eight) Hours As Needed for Nausea or Vomiting., Disp: , Rfl:     oxyCODONE-acetaminophen (PERCOCET) 5-325 MG per tablet, Take 1 tablet by mouth 3 (Three) Times a Day., Disp: , Rfl:     potassium chloride 10 MEQ CR tablet, Take 2 tablets by mouth 2 (Two) Times a Day., Disp: 180 tablet, Rfl: 3    prazosin (MINIPRESS) 1 MG capsule, Take 1 capsule by mouth Every Night., Disp: , Rfl:     prazosin (MINIPRESS) 2 MG capsule, Take 1 capsule by mouth Every Night., Disp: , Rfl:     QUEtiapine (SEROquel) 100 MG tablet, Take 1 tablet by mouth Every Night., Disp: , Rfl:     Symbicort 80-4.5 MCG/ACT inhaler, Inhale 2 puffs 2 (Two) Times a Day., Disp: , Rfl:     Vitamin E 180 MG (400 UNIT) capsule capsule, Take 1 capsule by mouth Daily., Disp: , Rfl:     hydroCHLOROthiazide (HYDRODIURIL) 25 MG tablet, Take 1 tablet by mouth Daily., Disp: 90 tablet, Rfl: 3    sennosides-docusate (PERICOLACE) 8.6-50 MG per tablet, Take 2 tablets by mouth 2 (Two) Times a Day. (Patient not taking:  "Reported on 11/6/2023), Disp: 20 tablet, Rfl: 0      Review of Systems   Constitutional: Negative for malaise/fatigue.   Cardiovascular:  Positive for near-syncope and syncope. Negative for chest pain, dyspnea on exertion, irregular heartbeat and palpitations.   Respiratory:  Negative for shortness of breath.    Neurological:  Positive for dizziness.        Objective     /92   Pulse 87   Ht 170.2 cm (67\")   Wt 84.8 kg (187 lb)   BMI 29.29 kg/m²       General Appearance:   well developed  well nourished  HENT:   oropharynx moist  lips not cyanotic  Neck:  thyroid not enlarged  supple  Respiratory:  no respiratory distress  normal breath sounds  no rales  Cardiovascular:  no jugular venous distention  regular rhythm  apical impulse normal  S1 normal, S2 normal  no S3, no S4   no murmur  no rub, no thrill  carotid pulses normal; no bruit  pedal pulses normal  lower extremity edema: none    Musculoskeletal:  no clubbing of fingers.   normocephalic, head atraumatic  Skin:   warm, dry  Psychiatric:  judgement and insight appropriate  normal mood and affect      Result Review :     The following data was reviewed by: JENA Willis on 12/21/2023:    CMP          5/2/2023    11:06 5/12/2023    04:17 5/31/2023    13:14   CMP   Glucose 127  158  91    BUN 13  14  11    Creatinine 0.71  0.79  0.62    EGFR 104.4  91.8  109.3    Sodium 141  137  141    Potassium 3.5  2.9  4.1    Chloride 104  101  102    Calcium 8.9  8.5  9.5    Total Protein 6.9   7.5    Albumin 3.8   3.9    Globulin 3.1   3.6    Total Bilirubin 0.3   0.3    Alkaline Phosphatase 92   136    AST (SGOT) 15   15    ALT (SGPT) 15   16    Albumin/Globulin Ratio 1.2   1.1    BUN/Creatinine Ratio 18.3  17.7  17.7    Anion Gap 10.0  9.9  12.1      CBC          5/11/2023    11:23 5/12/2023    04:17 5/31/2023    12:05   CBC   WBC   5.22    RBC   4.26    Hemoglobin 11.4  9.8  12.4    Hematocrit 36.4  30.5  39.1    MCV   91.8    MCH   29.1    MCHC   " 31.7    RDW   16.4    Platelets   451            Data reviewed : Cardiology studies as above      Procedures      Casi Lopez  reports that she has never smoked. She has never used smokeless tobacco.. I have educated her on the risk of diseases from using tobacco products such as cancer, COPD, and heart disease.              Assessment and Plan        ASSESSMENT:  Encounter Diagnoses   Name Primary?    Syncope and collapse Yes    Essential hypertension     ASD (atrial septal defect)          PLAN:    1.  Syncope and collapse-30-day monitor without any significant arrhythmias however she did not have any presyncope or syncope while wearing the monitor.  I feel she would benefit from a longer-term monitoring device using implanted loop recorder.  Procedure discussed with patient and she is agreeable to proceed.  Previous echocardiogram showed no structural abnormalities and coronary angiography was previously normal.  2.  Essential hypertension-add hydrochlorothiazide.  BMP in 2 weeks.  3.  Atrial septal defect postsurgical repair in 1980.    Follow-up after loop recorder insertion      Patient was given instructions and counseling regarding her condition or for health maintenance advice. Please see specific information pulled into the AVS if appropriate.           Marce Graham, APRN   12/21/2023  09:25 EST

## 2023-12-28 ENCOUNTER — PREP FOR SURGERY (OUTPATIENT)
Dept: OTHER | Facility: HOSPITAL | Age: 48
End: 2023-12-28
Payer: COMMERCIAL

## 2023-12-28 DIAGNOSIS — R55 SYNCOPE AND COLLAPSE: Primary | ICD-10-CM

## 2023-12-28 RX ORDER — SODIUM CHLORIDE 9 MG/ML
40 INJECTION, SOLUTION INTRAVENOUS AS NEEDED
OUTPATIENT
Start: 2023-12-28

## 2023-12-28 RX ORDER — SODIUM CHLORIDE 0.9 % (FLUSH) 0.9 %
10 SYRINGE (ML) INJECTION EVERY 12 HOURS SCHEDULED
OUTPATIENT
Start: 2023-12-28

## 2023-12-28 RX ORDER — SODIUM CHLORIDE 0.9 % (FLUSH) 0.9 %
10 SYRINGE (ML) INJECTION AS NEEDED
OUTPATIENT
Start: 2023-12-28

## 2024-01-03 ENCOUNTER — TELEPHONE (OUTPATIENT)
Dept: CARDIOLOGY | Facility: CLINIC | Age: 49
End: 2024-01-03
Payer: COMMERCIAL

## 2024-01-03 NOTE — TELEPHONE ENCOUNTER
I spoke to patient and gave an arrival time of 6:30 on 01/11/24 for [employer] loop recorder implant. Patient was instructed to have a  for the day of the procedure and to arrive at the main entrance/registration area. Patient was informed that because this is a new device, they will be required to stay overnight for observation. Patient was instructed to continue all medications as usual. Patient was instructed to be NPO after midnight with sips of water as needed. Patient was instructed to have labs completed 24-48 hours prior to procedure. Patient is agreeable with no other questions or concerns.

## 2024-01-09 ENCOUNTER — LAB (OUTPATIENT)
Dept: LAB | Facility: HOSPITAL | Age: 49
End: 2024-01-09
Payer: COMMERCIAL

## 2024-01-09 DIAGNOSIS — I10 ESSENTIAL HYPERTENSION: ICD-10-CM

## 2024-01-09 LAB
ANION GAP SERPL CALCULATED.3IONS-SCNC: 8.8 MMOL/L (ref 5–15)
BUN SERPL-MCNC: 9 MG/DL (ref 6–20)
BUN/CREAT SERPL: 11.3 (ref 7–25)
CALCIUM SPEC-SCNC: 9.8 MG/DL (ref 8.6–10.5)
CHLORIDE SERPL-SCNC: 102 MMOL/L (ref 98–107)
CO2 SERPL-SCNC: 31.2 MMOL/L (ref 22–29)
CREAT SERPL-MCNC: 0.8 MG/DL (ref 0.57–1)
EGFRCR SERPLBLD CKD-EPI 2021: 90.5 ML/MIN/1.73
GLUCOSE SERPL-MCNC: 115 MG/DL (ref 65–99)
POTASSIUM SERPL-SCNC: 4.3 MMOL/L (ref 3.5–5.2)
SODIUM SERPL-SCNC: 142 MMOL/L (ref 136–145)

## 2024-01-09 PROCEDURE — 36415 COLL VENOUS BLD VENIPUNCTURE: CPT

## 2024-01-09 PROCEDURE — 80048 BASIC METABOLIC PNL TOTAL CA: CPT

## 2024-01-09 NOTE — PRE-PROCEDURE INSTRUCTIONS
PATIENT INSTRUCTED TO BE:    - NPO AFTER MIDNIGHT EXCEPT CAN HAVE SIPS OF WATER WITH HIS MEDICATION PRIOR TO PROCEDURE    -  INSTRUCTED NO LOTIONS, JEWELRY, PIERCINGS, OR DEODORANT DAY OF THE PROCEDURE    - INSTRUCTED TO TAKE THE FOLLOWING MEDICATIONS THE DAY OF SURGERY:          INHALERS, SYMMETREL, ASA, CARBATROL, COREG, LEXAPRO, GABAPENTIN, HCTZ, HYDROXYZINE PRN,  JARDIANCE, METFORMIN, ROBAXIN PRN, CLARITIN, PRILOSEC, ZOFRAN PRN, POTASSIUM, PERCOCET PRN    - INSTRUCTED PT TO FOLLOW ANY INSTRUCTIONS GIVEN BY HIS CARDIOLOGIST.    - INFORMED PT AN INCISION WILL BE MADE IN UPPER CHEST FOR PACEMAKER PLACEMENT. HE/ SHE WILL GO HOME IF HAVING A BATTERY CHANGE TO THE PACEMAKER. IF A NEW DEVICE/PACEMAKER IS PLACED, HE/SHE WILL STAY OVER NIGHT FOR OBSERVATION AND MONITORING.     - INFORMED THE PATIENT HE CAN HAVE TWO VISITORS WITH HIM THE DAY OF THE PROCEDURE    - INSTRUCTED PT TO PARK IN THE PARKING GARAGE, ENTER THE HOSPITAL THRU ENTRANCE A AND  CHECK IN AT THE MAIN REGISTRATION DESK, AFTER REGISTRATION PT WILL BE TAKEN THE THE PREOP AREA FOR HIS PROCEDURE.    -ARRIVAL TIME GIVEN BY CARDIOLOGIST OFFICE, INFORMED PT IF ARRIVAL TIME CHANGES OR ADJUSTMENTS NEED TO BE MADE IN THEIR ARRIVAL TIME, HE/SHE WOULD RECEIVE A CALL.    -INSTRUCTED PT TO COME TO [facility] TO GET THEIR LABS/ EKG DONE 48 HOURS PRIOR TO PROCEDURE      - PATIENT VERBALIZED UNDERSTANDING

## 2024-01-11 ENCOUNTER — HOSPITAL ENCOUNTER (OUTPATIENT)
Facility: HOSPITAL | Age: 49
Setting detail: HOSPITAL OUTPATIENT SURGERY
Discharge: HOME | End: 2024-01-11
Attending: INTERNAL MEDICINE | Admitting: INTERNAL MEDICINE
Payer: COMMERCIAL

## 2024-01-11 VITALS
OXYGEN SATURATION: 100 % | DIASTOLIC BLOOD PRESSURE: 41 MMHG | SYSTOLIC BLOOD PRESSURE: 96 MMHG | HEART RATE: 88 BPM | RESPIRATION RATE: 16 BRPM | HEIGHT: 67 IN | BODY MASS INDEX: 29.35 KG/M2 | WEIGHT: 187 LBS

## 2024-01-11 DIAGNOSIS — R55 SYNCOPE AND COLLAPSE: ICD-10-CM

## 2024-01-11 PROCEDURE — 33285 INSJ SUBQ CAR RHYTHM MNTR: CPT | Performed by: INTERNAL MEDICINE

## 2024-01-11 PROCEDURE — 25010000002 CEFAZOLIN IN DEXTROSE 2000 MG/ 100 ML SOLUTION: Performed by: INTERNAL MEDICINE

## 2024-01-11 PROCEDURE — 25010000002 MIDAZOLAM PER 1MG: Performed by: INTERNAL MEDICINE

## 2024-01-11 RX ORDER — MIDAZOLAM HYDROCHLORIDE 2 MG/2ML
INJECTION, SOLUTION INTRAMUSCULAR; INTRAVENOUS
Status: DISCONTINUED | OUTPATIENT
Start: 2024-01-11 | End: 2024-01-11 | Stop reason: HOSPADM

## 2024-01-11 RX ORDER — SODIUM CHLORIDE 9 MG/ML
40 INJECTION, SOLUTION INTRAVENOUS AS NEEDED
Status: DISCONTINUED | OUTPATIENT
Start: 2024-01-11 | End: 2024-01-11 | Stop reason: HOSPADM

## 2024-01-11 RX ORDER — SODIUM CHLORIDE 0.9 % (FLUSH) 0.9 %
10 SYRINGE (ML) INJECTION EVERY 12 HOURS SCHEDULED
Status: DISCONTINUED | OUTPATIENT
Start: 2024-01-11 | End: 2024-01-11 | Stop reason: HOSPADM

## 2024-01-11 RX ORDER — CEFAZOLIN SODIUM 2 G/100ML
INJECTION, SOLUTION INTRAVENOUS
Status: COMPLETED | OUTPATIENT
Start: 2024-01-11 | End: 2024-01-11

## 2024-01-11 RX ORDER — LIDOCAINE HYDROCHLORIDE 20 MG/ML
INJECTION, SOLUTION INFILTRATION; PERINEURAL
Status: DISCONTINUED | OUTPATIENT
Start: 2024-01-11 | End: 2024-01-11 | Stop reason: HOSPADM

## 2024-01-11 RX ORDER — SODIUM CHLORIDE 0.9 % (FLUSH) 0.9 %
10 SYRINGE (ML) INJECTION AS NEEDED
Status: DISCONTINUED | OUTPATIENT
Start: 2024-01-11 | End: 2024-01-11 | Stop reason: HOSPADM

## 2024-01-11 NOTE — DISCHARGE INSTRUCTIONS
Keep the incision relatively dry for the first 7 days  Do not remove the adhesive, let it come off on its own  Call if any redness, swelling, discharge or bleeding from the site  Can take Tylenol over-the-counter as needed for discomfort

## 2024-01-11 NOTE — NURSING NOTE
Patient returned from procedure stable. Site showed no signs of bleeding or oozing. Patient was monitored for an hour. VSS. Patient and daughter were educated on discharge instructions and follow up appointment. Patient and daughter verbalized understanding. IV removed.

## 2024-01-11 NOTE — INTERVAL H&P NOTE
H&P reviewed. The patient was examined and there are no changes to the H&P.      Proceed with loop recorder implant

## 2024-02-07 ENCOUNTER — CLINICAL SUPPORT NO REQUIREMENTS (OUTPATIENT)
Dept: CARDIOLOGY | Facility: CLINIC | Age: 49
End: 2024-02-07
Payer: COMMERCIAL

## 2024-02-07 DIAGNOSIS — R55 SYNCOPE AND COLLAPSE: Primary | ICD-10-CM

## 2024-02-07 DIAGNOSIS — Z45.09 ENCOUNTER FOR LOOP RECORDER CHECK: ICD-10-CM

## 2024-02-07 NOTE — PROGRESS NOTES
Normal Loop Recorder  Device Interrogation and Device Testing.  Normal evaluation of device function and lead measurements.  No optimization was needed of parameters or maximization of device longevity.  Patient is on automated Home Remote Monitoring.  Patients wound incision is healing and all corners are intact.  There are no signs of infection, no drainage, no swelling and cool to touch.  We reviewed Home remote follow up and the monitoring machine.  We also discussed the six week healing process and the do's and don'ts of activity that it specific to the patient.

## 2024-02-16 NOTE — PROGRESS NOTES
GYN Visit    No chief complaint on file.      HPI:   48 y.o.No obstetric history on file. Contraception or HRT: {APCONTRACEPTIONHRT:59949}    Menses:   q *** days, lasts *** days, changes products q ***hrs on heaviest days.   Pain:  {APPAIN:34989}    ***      History: PMHx, Meds, Allergies, PSHx, Social Hx, and POBHx all reviewed and updated.    PHYSICAL EXAM:  There were no vitals taken for this visit.  General- NAD, alert and oriented, appropriate  Psych- Normal mood, good memory    Neck- No masses, no thyroid enlargement  CV- Regular rhythm, no murnurs  Resp- CTA to bases, no wheezes  Abdomen- Soft, non distended, non tender, no masses    Breast left-  Bilaterally symmetrical, no masses, non tender, no nipple discharge  Breast right- Bilaterally symmetrical, no masses, non tender, no nipple discharge    External genitalia- Normal female, no lesions  Urethra/meatus- Normal, no masses, non tender, no prolapse  Bladder- Normal, no masses, non tender, no prolapse  Vagina- Normal, no atrophy, no lesions, no discharge, no prolapse  Cvx- {APCVX:09616}  Uterus- {APUTERUS:46389}  Adnexa- {APADNEXA:77417}  Anus/Rectum/Perineum- {APRECTAL:99109}    Lymphatic- No palpable neck, axillary, or groin nodes  Ext- No edema, no cyanosis    Skin- No lesions, no rashes, no acanthosis nigricans  {APNEXPLANON (Optional):03081}    ASSESSMENT AND PLAN:  There are no diagnoses linked to this encounter.  {APGYNCOUNSELING (Optional):77893}  {Ectopic/MTX Treatment/FU/Plan (Optional):48940}  {Referral/Consult (Optional):71191}  Follow Up:  No follow-ups on file.    {Time Spent-Use for E/M Coding (Optional):12529}      Neha Barreto MA  02/19/2024    [facility]  [address] [place] [address]  Dept: [phone]  Dept Fax: [phone]  Loc: [phone]  Loc Fax: [phone]

## 2024-02-19 ENCOUNTER — OFFICE VISIT (OUTPATIENT)
Dept: OBSTETRICS AND GYNECOLOGY | Facility: CLINIC | Age: 49
End: 2024-02-19
Payer: COMMERCIAL

## 2024-02-19 VITALS
SYSTOLIC BLOOD PRESSURE: 108 MMHG | HEIGHT: 67 IN | WEIGHT: 186 LBS | BODY MASS INDEX: 29.19 KG/M2 | HEART RATE: 92 BPM | DIASTOLIC BLOOD PRESSURE: 70 MMHG

## 2024-02-19 DIAGNOSIS — N95.1 MENOPAUSAL SYMPTOMS: Primary | ICD-10-CM

## 2024-02-19 PROCEDURE — 1159F MED LIST DOCD IN RCRD: CPT | Performed by: OBSTETRICS & GYNECOLOGY

## 2024-02-19 PROCEDURE — 3074F SYST BP LT 130 MM HG: CPT | Performed by: OBSTETRICS & GYNECOLOGY

## 2024-02-19 PROCEDURE — 3078F DIAST BP <80 MM HG: CPT | Performed by: OBSTETRICS & GYNECOLOGY

## 2024-02-19 PROCEDURE — 99203 OFFICE O/P NEW LOW 30 MIN: CPT | Performed by: OBSTETRICS & GYNECOLOGY

## 2024-02-19 PROCEDURE — 1160F RVW MEDS BY RX/DR IN RCRD: CPT | Performed by: OBSTETRICS & GYNECOLOGY

## 2024-02-19 NOTE — PROGRESS NOTES
"GYN Visit    Chief Complaint   Patient presents with    Menopause     Discuss menopausal symptoms, Hot flashes throughout the day        HPI:   48 y.o. s/p TLH here for hot flashes. Pt states she has hot flashes all the time. She states this started about 1-2 years ago.TLH 2014 due to fibroids and pelvic pain. Ovaries were not removed. Pt here to consider HRT.      History: PMHx, Meds, Allergies, PSHx, Social Hx, and POBHx all reviewed and updated.    PHYSICAL EXAM:  /70   Pulse 92   Ht 170.2 cm (67\")   Wt 84.4 kg (186 lb)   Breastfeeding No   BMI 29.13 kg/m²   General- NAD, alert and oriented, appropriate  Psych- Normal mood, good memory        ASSESSMENT AND PLAN:  Diagnoses and all orders for this visit:    1. Menopausal symptoms (Primary)     Pt on multitude of medications some of which have side effect of hot flashes.  I am not sure that patient's symptoms are due to menopause vs medication.  We discussed average age of menopause as 51-53 with some people going through as early as 45 and some as late as 55. Due to the patient's extensive medical history and her medications, I recommend she see her heart doctor and make sure he would be OK with the patient starting on HRT.  We discussed that some of the patients prescribed medications are also used for the treatment of hot flashes.  The patient will let me know if her cardiologist is ok with HRT and if so we will prescribe estrogen.  She also understands that this may not help her symptoms especially if they are due to her medications.        Follow Up:  No follow-ups on file.          Ivonne Mann DO  2024    [facility]  [address] [place] [address]  Dept: [phone]  Dept Fax: [phone]  Loc: [phone]  Loc Fax: [phone]     "

## 2024-02-21 ENCOUNTER — PATIENT ROUNDING (BHMG ONLY) (OUTPATIENT)
Dept: OBSTETRICS AND GYNECOLOGY | Facility: CLINIC | Age: 49
End: 2024-02-21
Payer: COMMERCIAL

## 2024-03-05 ENCOUNTER — TELEPHONE (OUTPATIENT)
Dept: CARDIOLOGY | Facility: CLINIC | Age: 49
End: 2024-03-05
Payer: COMMERCIAL

## 2024-03-05 NOTE — TELEPHONE ENCOUNTER
Ms. Lopez was implanted with a Loop recorder for Syncope.  She had several episodes where she felt her heart racing and felt as if she was going to pass out.    During this episode, the loop recorder recorded that patient was in Atrial Fibrillation for 10 hours and 6 minutes.    She also wanted to know if her Gynecologist could write her a prescription for Hot Flashes.  The Gynecologist wanted to verify if there would be a conflict with her current medications.

## 2024-03-27 NOTE — TELEPHONE ENCOUNTER
----- Message from JENA Willis sent at 4/25/2023  3:18 PM EDT -----  I saw this patient today in clinic for surgical clearance.  Can we please send a clearance to Dr. Gruber's office he is orthopedic surgeon.  She is low cardiac risk and needs no additional testing prior to surgery.      Reached out to pt to schedule AWV  Last AWV 7/20/23    Dr Thornton is pt's pcp but is on Dr Leiva list please ask insurance to switch pcp to correct doctor.

## 2024-07-02 ENCOUNTER — TRANSCRIBE ORDERS (OUTPATIENT)
Dept: ADMINISTRATIVE | Facility: HOSPITAL | Age: 49
End: 2024-07-02

## 2024-07-02 DIAGNOSIS — R10.11 RUQ PAIN: Primary | ICD-10-CM

## 2024-07-02 DIAGNOSIS — R11.2 NAUSEA AND VOMITING, UNSPECIFIED VOMITING TYPE: ICD-10-CM

## 2024-09-13 ENCOUNTER — HOSPITAL ENCOUNTER (OUTPATIENT)
Facility: HOSPITAL | Age: 49
Setting detail: OBSERVATION
LOS: 1 days | Discharge: HOME | End: 2024-09-14
Attending: EMERGENCY MEDICINE | Admitting: HOSPITALIST
Payer: COMMERCIAL

## 2024-09-13 ENCOUNTER — APPOINTMENT (OUTPATIENT)
Dept: GENERAL RADIOLOGY | Facility: HOSPITAL | Age: 49
End: 2024-09-13
Payer: COMMERCIAL

## 2024-09-13 DIAGNOSIS — R55 SYNCOPE AND COLLAPSE: Primary | ICD-10-CM

## 2024-09-13 DIAGNOSIS — R55 VASOVAGAL SYNCOPE: ICD-10-CM

## 2024-09-13 LAB
ALBUMIN SERPL-MCNC: 3.6 G/DL (ref 3.5–5.2)
ALBUMIN/GLOB SERPL: 1.2 G/DL
ALP SERPL-CCNC: 94 U/L (ref 39–117)
ALT SERPL W P-5'-P-CCNC: 12 U/L (ref 1–33)
ANION GAP SERPL CALCULATED.3IONS-SCNC: 8.9 MMOL/L (ref 5–15)
ANISOCYTOSIS BLD QL: NORMAL
AST SERPL-CCNC: 16 U/L (ref 1–32)
BACTERIA UR QL AUTO: ABNORMAL /HPF
BASOPHILS # BLD AUTO: 0.02 10*3/MM3 (ref 0–0.2)
BASOPHILS NFR BLD AUTO: 0.4 % (ref 0–1.5)
BILIRUB SERPL-MCNC: 0.3 MG/DL (ref 0–1.2)
BILIRUB UR QL STRIP: NEGATIVE
BUN SERPL-MCNC: 12 MG/DL (ref 6–20)
BUN/CREAT SERPL: 14.8 (ref 7–25)
CALCIUM SPEC-SCNC: 9.1 MG/DL (ref 8.6–10.5)
CHLORIDE SERPL-SCNC: 103 MMOL/L (ref 98–107)
CLARITY UR: ABNORMAL
CLUMPED PLATELETS: PRESENT
CO2 SERPL-SCNC: 27.1 MMOL/L (ref 22–29)
COLOR UR: ABNORMAL
CREAT SERPL-MCNC: 0.81 MG/DL (ref 0.57–1)
D-LACTATE SERPL-SCNC: 1.7 MMOL/L (ref 0.5–2)
DEPRECATED RDW RBC AUTO: 51.7 FL (ref 37–54)
EGFRCR SERPLBLD CKD-EPI 2021: 88.6 ML/MIN/1.73
EOSINOPHIL # BLD AUTO: 0.04 10*3/MM3 (ref 0–0.4)
EOSINOPHIL NFR BLD AUTO: 0.8 % (ref 0.3–6.2)
ERYTHROCYTE [DISTWIDTH] IN BLOOD BY AUTOMATED COUNT: 15.7 % (ref 12.3–15.4)
FERRITIN SERPL-MCNC: 124.4 NG/ML (ref 13–150)
FOLATE SERPL-MCNC: 9.41 NG/ML (ref 4.78–24.2)
GLOBULIN UR ELPH-MCNC: 3 GM/DL
GLUCOSE BLDC GLUCOMTR-MCNC: 111 MG/DL (ref 70–99)
GLUCOSE SERPL-MCNC: 131 MG/DL (ref 65–99)
GLUCOSE UR STRIP-MCNC: NEGATIVE MG/DL
HCT VFR BLD AUTO: 32.6 % (ref 34–46.6)
HGB BLD-MCNC: 10.4 G/DL (ref 12–15.9)
HGB UR QL STRIP.AUTO: NEGATIVE
HOLD SPECIMEN: NORMAL
HOLD SPECIMEN: NORMAL
HYALINE CASTS UR QL AUTO: ABNORMAL /LPF
IMM GRANULOCYTES # BLD AUTO: 0.01 10*3/MM3 (ref 0–0.05)
IMM GRANULOCYTES NFR BLD AUTO: 0.2 % (ref 0–0.5)
IRON 24H UR-MRATE: 53 MCG/DL (ref 37–145)
IRON SATN MFR SERPL: 19 % (ref 20–50)
KETONES UR QL STRIP: NEGATIVE
L PNEUMO1 AG UR QL IA: NEGATIVE
LEUKOCYTE ESTERASE UR QL STRIP.AUTO: ABNORMAL
LYMPHOCYTES # BLD AUTO: 1.59 10*3/MM3 (ref 0.7–3.1)
LYMPHOCYTES NFR BLD AUTO: 30.6 % (ref 19.6–45.3)
MAGNESIUM SERPL-MCNC: 1.7 MG/DL (ref 1.6–2.6)
MCH RBC QN AUTO: 29.3 PG (ref 26.6–33)
MCHC RBC AUTO-ENTMCNC: 31.9 G/DL (ref 31.5–35.7)
MCV RBC AUTO: 91.8 FL (ref 79–97)
MONOCYTES # BLD AUTO: 0.42 10*3/MM3 (ref 0.1–0.9)
MONOCYTES NFR BLD AUTO: 8.1 % (ref 5–12)
MUCOUS THREADS URNS QL MICRO: ABNORMAL /HPF
NEUTROPHILS NFR BLD AUTO: 3.11 10*3/MM3 (ref 1.7–7)
NEUTROPHILS NFR BLD AUTO: 59.9 % (ref 42.7–76)
NITRITE UR QL STRIP: NEGATIVE
NRBC BLD AUTO-RTO: 0 /100 WBC (ref 0–0.2)
PH UR STRIP.AUTO: 5.5 [PH] (ref 5–8)
PHOSPHATE SERPL-MCNC: 3.8 MG/DL (ref 2.5–4.5)
PLATELET # BLD AUTO: 268 10*3/MM3 (ref 140–450)
PMV BLD AUTO: 10.9 FL (ref 6–12)
POTASSIUM SERPL-SCNC: 3.2 MMOL/L (ref 3.5–5.2)
PROCALCITONIN SERPL-MCNC: 0.03 NG/ML (ref 0–0.25)
PROT SERPL-MCNC: 6.6 G/DL (ref 6–8.5)
PROT UR QL STRIP: ABNORMAL
QT INTERVAL: 384 MS
QTC INTERVAL: 430 MS
RBC # BLD AUTO: 3.55 10*6/MM3 (ref 3.77–5.28)
RBC # UR STRIP: ABNORMAL /HPF
REF LAB TEST METHOD: ABNORMAL
RETICS # AUTO: 0.06 10*6/MM3 (ref 0.02–0.13)
RETICS/RBC NFR AUTO: 1.81 % (ref 0.7–1.9)
S PNEUM AG SPEC QL LA: NEGATIVE
SARS-COV-2 RNA RESP QL NAA+PROBE: DETECTED
SMALL PLATELETS BLD QL SMEAR: ADEQUATE
SODIUM SERPL-SCNC: 139 MMOL/L (ref 136–145)
SP GR UR STRIP: >1.03 (ref 1–1.03)
SQUAMOUS #/AREA URNS HPF: ABNORMAL /HPF
TIBC SERPL-MCNC: 283 MCG/DL (ref 298–536)
TRANSFERRIN SERPL-MCNC: 190 MG/DL (ref 200–360)
TROPONIN T SERPL HS-MCNC: 7 NG/L
UROBILINOGEN UR QL STRIP: ABNORMAL
VIT B12 BLD-MCNC: 397 PG/ML (ref 211–946)
WBC # UR STRIP: ABNORMAL /HPF
WBC MORPH BLD: NORMAL
WBC NRBC COR # BLD AUTO: 5.19 10*3/MM3 (ref 3.4–10.8)
WHOLE BLOOD HOLD COAG: NORMAL
WHOLE BLOOD HOLD SPECIMEN: NORMAL

## 2024-09-13 PROCEDURE — 83540 ASSAY OF IRON: CPT | Performed by: HOSPITALIST

## 2024-09-13 PROCEDURE — 80053 COMPREHEN METABOLIC PANEL: CPT | Performed by: EMERGENCY MEDICINE

## 2024-09-13 PROCEDURE — 84100 ASSAY OF PHOSPHORUS: CPT | Performed by: HOSPITALIST

## 2024-09-13 PROCEDURE — G0378 HOSPITAL OBSERVATION PER HR: HCPCS

## 2024-09-13 PROCEDURE — 25010000002 MAGNESIUM SULFATE 2 GM/50ML SOLUTION: Performed by: HOSPITALIST

## 2024-09-13 PROCEDURE — 82746 ASSAY OF FOLIC ACID SERUM: CPT | Performed by: HOSPITALIST

## 2024-09-13 PROCEDURE — 84484 ASSAY OF TROPONIN QUANT: CPT | Performed by: EMERGENCY MEDICINE

## 2024-09-13 PROCEDURE — 25810000003 SODIUM CHLORIDE 0.9 % SOLUTION: Performed by: HOSPITALIST

## 2024-09-13 PROCEDURE — 82948 REAGENT STRIP/BLOOD GLUCOSE: CPT

## 2024-09-13 PROCEDURE — 85045 AUTOMATED RETICULOCYTE COUNT: CPT | Performed by: HOSPITALIST

## 2024-09-13 PROCEDURE — 99223 1ST HOSP IP/OBS HIGH 75: CPT | Performed by: HOSPITALIST

## 2024-09-13 PROCEDURE — 87449 NOS EACH ORGANISM AG IA: CPT | Performed by: HOSPITALIST

## 2024-09-13 PROCEDURE — 25010000002 AZITHROMYCIN PER 500 MG: Performed by: HOSPITALIST

## 2024-09-13 PROCEDURE — 84466 ASSAY OF TRANSFERRIN: CPT | Performed by: HOSPITALIST

## 2024-09-13 PROCEDURE — 87635 SARS-COV-2 COVID-19 AMP PRB: CPT | Performed by: HOSPITALIST

## 2024-09-13 PROCEDURE — 93005 ELECTROCARDIOGRAM TRACING: CPT | Performed by: EMERGENCY MEDICINE

## 2024-09-13 PROCEDURE — 96372 THER/PROPH/DIAG INJ SC/IM: CPT

## 2024-09-13 PROCEDURE — 25010000002 CEFTRIAXONE PER 250 MG: Performed by: HOSPITALIST

## 2024-09-13 PROCEDURE — 82728 ASSAY OF FERRITIN: CPT | Performed by: HOSPITALIST

## 2024-09-13 PROCEDURE — 36415 COLL VENOUS BLD VENIPUNCTURE: CPT

## 2024-09-13 PROCEDURE — 84145 PROCALCITONIN (PCT): CPT | Performed by: HOSPITALIST

## 2024-09-13 PROCEDURE — 87040 BLOOD CULTURE FOR BACTERIA: CPT | Performed by: HOSPITALIST

## 2024-09-13 PROCEDURE — 71045 X-RAY EXAM CHEST 1 VIEW: CPT

## 2024-09-13 PROCEDURE — 87899 AGENT NOS ASSAY W/OPTIC: CPT | Performed by: HOSPITALIST

## 2024-09-13 PROCEDURE — 83605 ASSAY OF LACTIC ACID: CPT | Performed by: EMERGENCY MEDICINE

## 2024-09-13 PROCEDURE — 99285 EMERGENCY DEPT VISIT HI MDM: CPT

## 2024-09-13 PROCEDURE — 81001 URINALYSIS AUTO W/SCOPE: CPT | Performed by: EMERGENCY MEDICINE

## 2024-09-13 PROCEDURE — 83735 ASSAY OF MAGNESIUM: CPT | Performed by: EMERGENCY MEDICINE

## 2024-09-13 PROCEDURE — 82607 VITAMIN B-12: CPT | Performed by: HOSPITALIST

## 2024-09-13 PROCEDURE — 85025 COMPLETE CBC W/AUTO DIFF WBC: CPT | Performed by: EMERGENCY MEDICINE

## 2024-09-13 PROCEDURE — 85007 BL SMEAR W/DIFF WBC COUNT: CPT | Performed by: EMERGENCY MEDICINE

## 2024-09-13 PROCEDURE — 25010000002 HEPARIN (PORCINE) PER 1000 UNITS: Performed by: HOSPITALIST

## 2024-09-13 RX ORDER — ACETAMINOPHEN 650 MG/1
650 SUPPOSITORY RECTAL EVERY 4 HOURS PRN
Status: DISCONTINUED | OUTPATIENT
Start: 2024-09-13 | End: 2024-09-14 | Stop reason: HOSPADM

## 2024-09-13 RX ORDER — POTASSIUM CHLORIDE 750 MG/1
40 CAPSULE, EXTENDED RELEASE ORAL ONCE
Status: COMPLETED | OUTPATIENT
Start: 2024-09-13 | End: 2024-09-13

## 2024-09-13 RX ORDER — ESCITALOPRAM OXALATE 10 MG/1
15 TABLET ORAL DAILY
Status: DISCONTINUED | OUTPATIENT
Start: 2024-09-13 | End: 2024-09-13

## 2024-09-13 RX ORDER — BISACODYL 5 MG/1
5 TABLET, DELAYED RELEASE ORAL DAILY PRN
Status: DISCONTINUED | OUTPATIENT
Start: 2024-09-13 | End: 2024-09-14 | Stop reason: HOSPADM

## 2024-09-13 RX ORDER — AMOXICILLIN 250 MG
2 CAPSULE ORAL 2 TIMES DAILY
Status: DISCONTINUED | OUTPATIENT
Start: 2024-09-13 | End: 2024-09-14 | Stop reason: HOSPADM

## 2024-09-13 RX ORDER — HYDROCHLOROTHIAZIDE 12.5 MG/1
25 TABLET ORAL DAILY
Status: DISCONTINUED | OUTPATIENT
Start: 2024-09-13 | End: 2024-09-13

## 2024-09-13 RX ORDER — BISACODYL 5 MG/1
5 TABLET, DELAYED RELEASE ORAL DAILY PRN
Status: DISCONTINUED | OUTPATIENT
Start: 2024-09-13 | End: 2024-09-13 | Stop reason: SDUPTHER

## 2024-09-13 RX ORDER — MONTELUKAST SODIUM 10 MG/1
10 TABLET ORAL NIGHTLY
Status: DISCONTINUED | OUTPATIENT
Start: 2024-09-13 | End: 2024-09-13

## 2024-09-13 RX ORDER — ATORVASTATIN CALCIUM 20 MG/1
20 TABLET, FILM COATED ORAL DAILY
Status: DISCONTINUED | OUTPATIENT
Start: 2024-09-13 | End: 2024-09-13

## 2024-09-13 RX ORDER — POLYETHYLENE GLYCOL 3350 17 G/17G
17 POWDER, FOR SOLUTION ORAL DAILY PRN
Status: DISCONTINUED | OUTPATIENT
Start: 2024-09-13 | End: 2024-09-13 | Stop reason: SDUPTHER

## 2024-09-13 RX ORDER — POLYETHYLENE GLYCOL 3350 17 G/17G
17 POWDER, FOR SOLUTION ORAL DAILY PRN
Status: DISCONTINUED | OUTPATIENT
Start: 2024-09-13 | End: 2024-09-14 | Stop reason: HOSPADM

## 2024-09-13 RX ORDER — SODIUM CHLORIDE 0.9 % (FLUSH) 0.9 %
10 SYRINGE (ML) INJECTION AS NEEDED
Status: DISCONTINUED | OUTPATIENT
Start: 2024-09-13 | End: 2024-09-14

## 2024-09-13 RX ORDER — MAGNESIUM SULFATE HEPTAHYDRATE 40 MG/ML
2 INJECTION, SOLUTION INTRAVENOUS ONCE
Status: COMPLETED | OUTPATIENT
Start: 2024-09-13 | End: 2024-09-13

## 2024-09-13 RX ORDER — PANTOPRAZOLE SODIUM 40 MG/1
40 TABLET, DELAYED RELEASE ORAL
Status: DISCONTINUED | OUTPATIENT
Start: 2024-09-14 | End: 2024-09-13

## 2024-09-13 RX ORDER — QUETIAPINE FUMARATE 100 MG/1
100 TABLET, FILM COATED ORAL NIGHTLY
Status: DISCONTINUED | OUTPATIENT
Start: 2024-09-13 | End: 2024-09-13

## 2024-09-13 RX ORDER — ALBUTEROL SULFATE 90 UG/1
2 AEROSOL, METERED RESPIRATORY (INHALATION) EVERY 4 HOURS PRN
Status: DISCONTINUED | OUTPATIENT
Start: 2024-09-13 | End: 2024-09-14

## 2024-09-13 RX ORDER — PRAZOSIN HYDROCHLORIDE 1 MG/1
2 CAPSULE ORAL NIGHTLY
Status: DISCONTINUED | OUTPATIENT
Start: 2024-09-13 | End: 2024-09-13

## 2024-09-13 RX ORDER — SODIUM CHLORIDE 0.9 % (FLUSH) 0.9 %
10 SYRINGE (ML) INJECTION EVERY 12 HOURS SCHEDULED
Status: DISCONTINUED | OUTPATIENT
Start: 2024-09-13 | End: 2024-09-14 | Stop reason: HOSPADM

## 2024-09-13 RX ORDER — ALUMINA, MAGNESIA, AND SIMETHICONE 2400; 2400; 240 MG/30ML; MG/30ML; MG/30ML
15 SUSPENSION ORAL EVERY 6 HOURS PRN
Status: DISCONTINUED | OUTPATIENT
Start: 2024-09-13 | End: 2024-09-14 | Stop reason: HOSPADM

## 2024-09-13 RX ORDER — HEPARIN SODIUM 5000 [USP'U]/ML
5000 INJECTION, SOLUTION INTRAVENOUS; SUBCUTANEOUS EVERY 8 HOURS SCHEDULED
Status: DISCONTINUED | OUTPATIENT
Start: 2024-09-13 | End: 2024-09-13

## 2024-09-13 RX ORDER — AMOXICILLIN 250 MG
2 CAPSULE ORAL 2 TIMES DAILY
Status: DISCONTINUED | OUTPATIENT
Start: 2024-09-13 | End: 2024-09-13 | Stop reason: SDUPTHER

## 2024-09-13 RX ORDER — ONDANSETRON 4 MG/1
4 TABLET, ORALLY DISINTEGRATING ORAL EVERY 6 HOURS PRN
Status: DISCONTINUED | OUTPATIENT
Start: 2024-09-13 | End: 2024-09-14 | Stop reason: HOSPADM

## 2024-09-13 RX ORDER — SODIUM CHLORIDE 9 MG/ML
40 INJECTION, SOLUTION INTRAVENOUS AS NEEDED
Status: DISCONTINUED | OUTPATIENT
Start: 2024-09-13 | End: 2024-09-14 | Stop reason: HOSPADM

## 2024-09-13 RX ORDER — HYDROXYZINE HYDROCHLORIDE 25 MG/1
50 TABLET, FILM COATED ORAL EVERY 6 HOURS PRN
Status: DISCONTINUED | OUTPATIENT
Start: 2024-09-13 | End: 2024-09-14 | Stop reason: HOSPADM

## 2024-09-13 RX ORDER — SODIUM CHLORIDE 0.9 % (FLUSH) 0.9 %
10 SYRINGE (ML) INJECTION AS NEEDED
Status: DISCONTINUED | OUTPATIENT
Start: 2024-09-13 | End: 2024-09-14 | Stop reason: HOSPADM

## 2024-09-13 RX ORDER — ACETAMINOPHEN 160 MG/5ML
650 SOLUTION ORAL EVERY 4 HOURS PRN
Status: DISCONTINUED | OUTPATIENT
Start: 2024-09-13 | End: 2024-09-14 | Stop reason: HOSPADM

## 2024-09-13 RX ORDER — CARVEDILOL 12.5 MG/1
12.5 TABLET ORAL 2 TIMES DAILY
Status: DISCONTINUED | OUTPATIENT
Start: 2024-09-13 | End: 2024-09-13

## 2024-09-13 RX ORDER — CETIRIZINE HYDROCHLORIDE 10 MG/1
10 TABLET ORAL DAILY
Status: DISCONTINUED | OUTPATIENT
Start: 2024-09-13 | End: 2024-09-13

## 2024-09-13 RX ORDER — OXYCODONE AND ACETAMINOPHEN 5; 325 MG/1; MG/1
1 TABLET ORAL 3 TIMES DAILY
Status: DISCONTINUED | OUTPATIENT
Start: 2024-09-13 | End: 2024-09-13 | Stop reason: DRUGHIGH

## 2024-09-13 RX ORDER — ARIPIPRAZOLE 5 MG/1
10 TABLET ORAL NIGHTLY
Status: DISCONTINUED | OUTPATIENT
Start: 2024-09-13 | End: 2024-09-13

## 2024-09-13 RX ORDER — CHOLECALCIFEROL (VITAMIN D3) 1250 MCG
50000 CAPSULE ORAL WEEKLY
Status: DISCONTINUED | OUTPATIENT
Start: 2024-09-13 | End: 2024-09-13

## 2024-09-13 RX ORDER — OXYCODONE AND ACETAMINOPHEN 5; 325 MG/1; MG/1
1 TABLET ORAL 2 TIMES DAILY
Status: DISCONTINUED | OUTPATIENT
Start: 2024-09-13 | End: 2024-09-14

## 2024-09-13 RX ORDER — BISACODYL 10 MG
10 SUPPOSITORY, RECTAL RECTAL DAILY PRN
Status: DISCONTINUED | OUTPATIENT
Start: 2024-09-13 | End: 2024-09-13 | Stop reason: SDUPTHER

## 2024-09-13 RX ORDER — SODIUM CHLORIDE 9 MG/ML
75 INJECTION, SOLUTION INTRAVENOUS CONTINUOUS
Status: DISCONTINUED | OUTPATIENT
Start: 2024-09-13 | End: 2024-09-14 | Stop reason: HOSPADM

## 2024-09-13 RX ORDER — HEPARIN SODIUM 5000 [USP'U]/ML
5000 INJECTION, SOLUTION INTRAVENOUS; SUBCUTANEOUS EVERY 8 HOURS SCHEDULED
Status: DISCONTINUED | OUTPATIENT
Start: 2024-09-13 | End: 2024-09-14 | Stop reason: HOSPADM

## 2024-09-13 RX ORDER — DEXTROSE MONOHYDRATE 25 G/50ML
25 INJECTION, SOLUTION INTRAVENOUS
Status: DISCONTINUED | OUTPATIENT
Start: 2024-09-13 | End: 2024-09-14 | Stop reason: HOSPADM

## 2024-09-13 RX ORDER — METHOCARBAMOL 500 MG/1
500 TABLET, FILM COATED ORAL EVERY 8 HOURS PRN
Status: DISCONTINUED | OUTPATIENT
Start: 2024-09-13 | End: 2024-09-14 | Stop reason: HOSPADM

## 2024-09-13 RX ORDER — ONDANSETRON 2 MG/ML
4 INJECTION INTRAMUSCULAR; INTRAVENOUS EVERY 6 HOURS PRN
Status: DISCONTINUED | OUTPATIENT
Start: 2024-09-13 | End: 2024-09-14

## 2024-09-13 RX ORDER — NICOTINE POLACRILEX 4 MG
15 LOZENGE BUCCAL
Status: DISCONTINUED | OUTPATIENT
Start: 2024-09-13 | End: 2024-09-14 | Stop reason: HOSPADM

## 2024-09-13 RX ORDER — GABAPENTIN 300 MG/1
600 CAPSULE ORAL EVERY 12 HOURS SCHEDULED
Status: DISCONTINUED | OUTPATIENT
Start: 2024-09-13 | End: 2024-09-14 | Stop reason: HOSPADM

## 2024-09-13 RX ORDER — BUDESONIDE AND FORMOTEROL FUMARATE DIHYDRATE 160; 4.5 UG/1; UG/1
2 AEROSOL RESPIRATORY (INHALATION)
Status: DISCONTINUED | OUTPATIENT
Start: 2024-09-13 | End: 2024-09-13

## 2024-09-13 RX ORDER — IBUPROFEN 600 MG/1
1 TABLET ORAL
Status: DISCONTINUED | OUTPATIENT
Start: 2024-09-13 | End: 2024-09-14 | Stop reason: HOSPADM

## 2024-09-13 RX ORDER — BISACODYL 10 MG
10 SUPPOSITORY, RECTAL RECTAL DAILY PRN
Status: DISCONTINUED | OUTPATIENT
Start: 2024-09-13 | End: 2024-09-14 | Stop reason: HOSPADM

## 2024-09-13 RX ORDER — PRAZOSIN HYDROCHLORIDE 1 MG/1
1 CAPSULE ORAL NIGHTLY
Status: DISCONTINUED | OUTPATIENT
Start: 2024-09-13 | End: 2024-09-13

## 2024-09-13 RX ORDER — INSULIN LISPRO 100 [IU]/ML
2-9 INJECTION, SOLUTION INTRAVENOUS; SUBCUTANEOUS
Status: DISCONTINUED | OUTPATIENT
Start: 2024-09-13 | End: 2024-09-14 | Stop reason: HOSPADM

## 2024-09-13 RX ORDER — ACETAMINOPHEN 325 MG/1
650 TABLET ORAL EVERY 4 HOURS PRN
Status: DISCONTINUED | OUTPATIENT
Start: 2024-09-13 | End: 2024-09-14

## 2024-09-13 RX ORDER — MULTIPLE VITAMINS W/ MINERALS TAB 9MG-400MCG
1 TAB ORAL DAILY
Status: DISCONTINUED | OUTPATIENT
Start: 2024-09-13 | End: 2024-09-14 | Stop reason: HOSPADM

## 2024-09-13 RX ADMIN — Medication 1 TABLET: at 21:14

## 2024-09-13 RX ADMIN — SODIUM CHLORIDE 75 ML/HR: 9 INJECTION, SOLUTION INTRAVENOUS at 17:41

## 2024-09-13 RX ADMIN — CEFTRIAXONE SODIUM 1000 MG: 1 INJECTION, POWDER, FOR SOLUTION INTRAMUSCULAR; INTRAVENOUS at 21:14

## 2024-09-13 RX ADMIN — METHOCARBAMOL 500 MG: 500 TABLET ORAL at 18:14

## 2024-09-13 RX ADMIN — Medication 10 ML: at 21:15

## 2024-09-13 RX ADMIN — HEPARIN SODIUM 5000 UNITS: 5000 INJECTION INTRAVENOUS; SUBCUTANEOUS at 21:14

## 2024-09-13 RX ADMIN — POTASSIUM CHLORIDE 40 MEQ: 750 CAPSULE, EXTENDED RELEASE ORAL at 17:41

## 2024-09-13 RX ADMIN — Medication 5 MG: at 21:14

## 2024-09-13 RX ADMIN — AZITHROMYCIN 500 MG: 500 INJECTION, POWDER, LYOPHILIZED, FOR SOLUTION INTRAVENOUS at 14:27

## 2024-09-13 RX ADMIN — ACETAMINOPHEN 650 MG: 650 SOLUTION ORAL at 18:13

## 2024-09-13 RX ADMIN — MAGNESIUM SULFATE HEPTAHYDRATE 2 G: 40 INJECTION, SOLUTION INTRAVENOUS at 17:41

## 2024-09-13 RX ADMIN — OXYCODONE HYDROCHLORIDE AND ACETAMINOPHEN 1 TABLET: 5; 325 TABLET ORAL at 21:14

## 2024-09-13 RX ADMIN — GABAPENTIN 600 MG: 300 CAPSULE ORAL at 21:14

## 2024-09-13 NOTE — LETTER
September 14, 2024     Patient: Casi Lopez   YOB: 1975   Date of Visit: 9/13/2024       To Whom it May Concern:    Casi Lopez was seen in my clinic on 9/13/2024. She {Return to school/sport:91228}.    If you have any questions or concerns, please don't hesitate to call.         Sincerely,          No name on file        CC:   No Recipients

## 2024-09-13 NOTE — PLAN OF CARE
Problem: Adult Inpatient Plan of Care  Goal: Plan of Care Review  Outcome: Ongoing, Progressing  Goal: Patient-Specific Goal (Individualized)  Outcome: Ongoing, Progressing  Goal: Absence of Hospital-Acquired Illness or Injury  Outcome: Ongoing, Progressing  Goal: Optimal Comfort and Wellbeing  Outcome: Ongoing, Progressing  Goal: Readiness for Transition of Care  Outcome: Ongoing, Progressing  Intervention: Mutually Develop Transition Plan  Recent Flowsheet Documentation  Taken 9/13/2024 1842 by Cynthia Ricardo, RN  Transportation Anticipated: family or friend will provide  Patient/Family Anticipated Services at Transition:   Patient/Family Anticipates Transition to: home with family  Taken 9/13/2024 1822 by Cynthia Ricardo, RN  Equipment Currently Used at Home:   cpap   respiratory supplies   nebulizer   Goal Outcome Evaluation:

## 2024-09-13 NOTE — LETTER
September 14, 2024     Patient: Casi Lopez   YOB: 1975   Date of Visit: 9/13/2024       To Whom It May Concern:    It is my medical opinion that Casi Lopez may return to work on 09/16/2024 .           Sincerely,        Dr. Low

## 2024-09-13 NOTE — ED PROVIDER NOTES
"SHARED VISIT NOTE:    Patient is 49 y.o. year old female that presents to the ED for evaluation of syncope x 3, cough, dizziness.     Physical Exam    ED Course:    /59 (BP Location: Right arm, Patient Position: Lying)   Pulse 75   Temp 98.2 °F (36.8 °C) (Oral)   Resp 16   Ht 170.2 cm (67\")   Wt 74 kg (163 lb 2.3 oz)   SpO2 100%   BMI 25.55 kg/m²   Results for orders placed or performed during the hospital encounter of 09/13/24   Comprehensive Metabolic Panel    Specimen: Blood   Result Value Ref Range    Glucose 131 (H) 65 - 99 mg/dL    BUN 12 6 - 20 mg/dL    Creatinine 0.81 0.57 - 1.00 mg/dL    Sodium 139 136 - 145 mmol/L    Potassium 3.2 (L) 3.5 - 5.2 mmol/L    Chloride 103 98 - 107 mmol/L    CO2 27.1 22.0 - 29.0 mmol/L    Calcium 9.1 8.6 - 10.5 mg/dL    Total Protein 6.6 6.0 - 8.5 g/dL    Albumin 3.6 3.5 - 5.2 g/dL    ALT (SGPT) 12 1 - 33 U/L    AST (SGOT) 16 1 - 32 U/L    Alkaline Phosphatase 94 39 - 117 U/L    Total Bilirubin 0.3 0.0 - 1.2 mg/dL    Globulin 3.0 gm/dL    A/G Ratio 1.2 g/dL    BUN/Creatinine Ratio 14.8 7.0 - 25.0    Anion Gap 8.9 5.0 - 15.0 mmol/L    eGFR 88.6 >60.0 mL/min/1.73   Magnesium    Specimen: Blood   Result Value Ref Range    Magnesium 1.7 1.6 - 2.6 mg/dL   Single High Sensitivity Troponin T    Specimen: Blood   Result Value Ref Range    HS Troponin T 7 <14 ng/L   CBC Auto Differential    Specimen: Blood   Result Value Ref Range    WBC 5.19 3.40 - 10.80 10*3/mm3    RBC 3.55 (L) 3.77 - 5.28 10*6/mm3    Hemoglobin 10.4 (L) 12.0 - 15.9 g/dL    Hematocrit 32.6 (L) 34.0 - 46.6 %    MCV 91.8 79.0 - 97.0 fL    MCH 29.3 26.6 - 33.0 pg    MCHC 31.9 31.5 - 35.7 g/dL    RDW 15.7 (H) 12.3 - 15.4 %    RDW-SD 51.7 37.0 - 54.0 fl    MPV 10.9 6.0 - 12.0 fL    Platelets 268 140 - 450 10*3/mm3    Neutrophil % 59.9 42.7 - 76.0 %    Lymphocyte % 30.6 19.6 - 45.3 %    Monocyte % 8.1 5.0 - 12.0 %    Eosinophil % 0.8 0.3 - 6.2 %    Basophil % 0.4 0.0 - 1.5 %    Immature Grans % 0.2 0.0 - 0.5 % "    Neutrophils, Absolute 3.11 1.70 - 7.00 10*3/mm3    Lymphocytes, Absolute 1.59 0.70 - 3.10 10*3/mm3    Monocytes, Absolute 0.42 0.10 - 0.90 10*3/mm3    Eosinophils, Absolute 0.04 0.00 - 0.40 10*3/mm3    Basophils, Absolute 0.02 0.00 - 0.20 10*3/mm3    Immature Grans, Absolute 0.01 0.00 - 0.05 10*3/mm3    nRBC 0.0 0.0 - 0.2 /100 WBC   Scan Slide    Specimen: Blood   Result Value Ref Range    Anisocytosis Slight/1+ None Seen    WBC Morphology Normal Normal    Platelet Estimate Adequate Normal    Clumped Platelets Present None Seen   Urinalysis With Microscopic If Indicated (No Culture) - Urine, Clean Catch    Specimen: Urine, Clean Catch   Result Value Ref Range    Color, UA Dark Yellow (A) Yellow, Straw    Appearance, UA Cloudy (A) Clear    pH, UA 5.5 5.0 - 8.0    Specific Gravity, UA >1.030 (H) 1.005 - 1.030    Glucose, UA Negative Negative    Ketones, UA Negative Negative    Bilirubin, UA Negative Negative    Blood, UA Negative Negative    Protein, UA 30 mg/dL (1+) (A) Negative    Leuk Esterase, UA Trace (A) Negative    Nitrite, UA Negative Negative    Urobilinogen, UA 1.0 E.U./dL 0.2 - 1.0 E.U./dL   Lactic Acid, Plasma    Specimen: Blood   Result Value Ref Range    Lactate 1.7 0.5 - 2.0 mmol/L   Urinalysis, Microscopic Only - Urine, Clean Catch    Specimen: Urine, Clean Catch   Result Value Ref Range    RBC, UA None Seen None Seen, 0-2 /HPF    WBC, UA 6-10 (A) None Seen, 0-2 /HPF    Bacteria, UA 2+ (A) None Seen /HPF    Squamous Epithelial Cells, UA 7-12 (A) None Seen, 0-2 /HPF    Hyaline Casts, UA 0-2 None Seen /LPF    Mucus, UA Moderate/2+ (A) None Seen, Trace /HPF    Methodology Manual Light Microscopy    ECG 12 Lead ED Triage Standing Order; Syncope   Result Value Ref Range    QT Interval 384 ms    QTC Interval 430 ms   Green Top (Gel)   Result Value Ref Range    Extra Tube Hold for add-ons.    Lavender Top   Result Value Ref Range    Extra Tube hold for add-on    Gold Top - SST   Result Value Ref Range     Extra Tube Hold for add-ons.    Light Blue Top   Result Value Ref Range    Extra Tube Hold for add-ons.      Medications   sodium chloride 0.9 % flush 10 mL (has no administration in time range)   sodium chloride 0.9 % flush 10 mL (has no administration in time range)   sodium chloride 0.9 % flush 10 mL (has no administration in time range)   sodium chloride 0.9 % infusion 40 mL (has no administration in time range)   sennosides-docusate (PERICOLACE) 8.6-50 MG per tablet 2 tablet (has no administration in time range)     And   polyethylene glycol (MIRALAX) packet 17 g (has no administration in time range)     And   bisacodyl (DULCOLAX) EC tablet 5 mg (has no administration in time range)     And   bisacodyl (DULCOLAX) suppository 10 mg (has no administration in time range)     XR Chest 1 View    Result Date: 9/13/2024  Narrative: XR CHEST 1 VW Date of Exam: 9/13/2024 12:58 PM EDT Indication: Cough, persistent Cough cough Comparison: 5/31/2023 and prior Findings: Study limited by overlying support and monitoring apparatus. Lung volumes are low. Patient status post median sternotomy. The heart size is within normal limits for technique. Pulmonary vascularity appears grossly unremarkable in appearance. Dependent opacities and vascular crowding at the lung bases noted favored represent atelectasis. Findings slightly more prominent on the left than the right. Implantable rate monitoring device overlies the lower chest. There is no acute osseous abnormality appreciated.     Impression: Impression: 1. Dependent opacities at the lung bases favored represent atelectasis. Pneumonia not excluded. Electronically Signed: Bony Espinoza MD  9/13/2024 1:20 PM EDT  Workstation ID: [id number]     MDM:    Procedures    All labs were reviewed and interpreted by me.  All X-rays impressions were independently interpreted by me.          SHARED VISIT ATTESTATION:    This visit was performed by both myself and an APC.  I performed the  substantive portion of the medical decision making. The management plan was made or approved by me, and I take responsibility for patient management.           Evert Ramachandran MD  13:50 EDT  09/13/24         Evert Ramachandran MD  09/13/24 [id number]

## 2024-09-13 NOTE — Clinical Note
Level of Care: Progressive Care [20]   Diagnosis: Syncope [206001]   Admitting Physician: NEEIDA MONTIEL [[id number]]   Attending Physician: ENEIDA MONTIEL [[id number]]   Certification: I Certify That Inpatient Hospital Services Are Medically Necessary For Greater Than 2 Midnights

## 2024-09-13 NOTE — PAYOR COMM NOTE
"Casi Lopez (49 y.o. Female)     PATIENT INFORMATION  Name:  Casi Lopez  MRN#:     [id number]  :  1975     ADMISSION INFORMATION  CLASS: Observation   IP DOS: 24    CURRENT ATTENDING PROVIDER INFORMATION  Name/NPI: Brett Sutherland DO [[id number]]  Phone:  Phone: [phone]  Fax:  [phone]      REQUESTING PROVIDER and RENDERING FACILITY  Name:  [facility]   NPI:  [id number]  TID:  [id number]  Address:      [address] [address]  Phone:               [phone]  Fax:  [phone]      UTILIZATION REVIEW CONTACT INFORMATION  Phone:      [phone]  Fax:           [phone]      ADMISSION DIAGNOSIS  Syncope R55  Pneumonia  J18.9    ++++++++++++++++++++++++++++++++++++++++++++++++++++++++++++++++++++++++++++++++            Date of Birth   1975    Social Security Number       Address   [address] [address]    Home Phone   [phone]    MRN   [id number]       Rastafarian   None    Marital Status                               Admission Date   24    Admission Type   Emergency    Admitting Provider   Brett Sutherland DO    Attending Provider   Brett Sutherland DO    Department, Room/Bed   [facility] EMERGENCY ROOM,        Discharge Date       Discharge Disposition       Discharge Destination                                 Attending Provider: Brett Sutherland DO    Allergies: No Known Allergies    Isolation: None   Infection: COVID Screen (preop/placement) (24)   Code Status: Prior    Ht: 170.2 cm (67\")   Wt: 74 kg (163 lb 2.3 oz)    Admission Cmt: None   Principal Problem: Syncope [R55]                   Active Insurance as of 2024       Primary Coverage       Payor Plan Insurance Group Employer/Plan Group    [employer] [place]       Payor Plan Address Payor Plan Phone Number Payor Plan Fax Number Effective Dates    [address] [phone]  " 7/1/2024 - None Entered    [facility] [id number]         Subscriber Name Subscriber Birth Date Member ID       MARK OROZCO 1975 [id number]                      Syncope RRG Observation Care       Indications Met   Last updated by Ellen Mcguire RN on 9/13/2024 9863     Review Status Created By   Primary Completed Ellen Mcguire RN      Criteria Review   Syncope RRG Observation Care     Overall Determination: Indications Met     Criteria:  [×] Observation care is indicated for  1 or more  of the following  [A]:      [×] Patient at risk for cardiogenic etiology of syncope (eg, arrhythmia, valvular disease) that warrants further testing (eg, echocardiogram) or monitoring (eg, cardiac telemetry), as indicated by  1 or more  of the following :          [×] Prior history of cardiac disease (eg, coronary or valvular heart disease, cardiomyopathy, heart failure, atrial or ventricular arrhythmia, genetic or other predisposition to arrhythmia)              9/13/2024  2:54 PM                  -- 9/13/2024  2:54 PM by Ellen Mcguire RN --                      Loop recorder recorded 10 hrs of rapid A fib back in March 2024. Felt like she was going to pass out when this was occurring.      [×] Possible etiology that would require care, testing, or monitoring beyond emergency department care (eg, GI bleed, stroke, aortic dissection)          9/13/2024  2:54 PM              -- 9/13/2024  2:54 PM by Ellen Mcguire, DC --                  Also Has PNA. Will treat with IV antibiotics     Notes:  -- 9/13/2024  2:54 PM by Ellen Mcguire RN --      To ED c/o 3 syncopal episodes at work today. Was standing when suddenly dizzy. Denies any symptoms prior to syncopal episode.       Has loop recorder which recorded 10 hrs of A fib back in March. Is followed by Cardiology.       Denies N/V, SOA, CP or palpitations.                   PMHx: DM, GERD, ASthma, HTN/HLD, DARYL, THR. Loop recorder.                   ED results:       EKG:  "SR. No ST elevation or depression. Does have diffuse T wave inversion consistent with prior EKGs.       K+ 3.2      H/H 10.4/32      UA: Trace leuko estrace. 1+ protein. 2+ bacteria.       CXR: Dependent opacities at lung bases. Possible PNA.                   In ED: Zithromax IV.                   Admit:       Telemetry      O2      Zithromax IV qd      Ck legionella & strep pneumo                  Additional orders pending.                     Emergency Department Notes        Evert Ramachandran MD at 09/13/24 1350          SHARED VISIT NOTE:    Patient is 49 y.o. year old female that presents to the ED for evaluation of syncope x 3, cough, dizziness.     Physical Exam    ED Course:    /59 (BP Location: Right arm, Patient Position: Lying)   Pulse 75   Temp 98.2 °F (36.8 °C) (Oral)   Resp 16   Ht 170.2 cm (67\")   Wt 74 kg (163 lb 2.3 oz)   SpO2 100%   BMI 25.55 kg/m²   Results for orders placed or performed during the hospital encounter of 09/13/24   Comprehensive Metabolic Panel    Specimen: Blood   Result Value Ref Range    Glucose 131 (H) 65 - 99 mg/dL    BUN 12 6 - 20 mg/dL    Creatinine 0.81 0.57 - 1.00 mg/dL    Sodium 139 136 - 145 mmol/L    Potassium 3.2 (L) 3.5 - 5.2 mmol/L    Chloride 103 98 - 107 mmol/L    CO2 27.1 22.0 - 29.0 mmol/L    Calcium 9.1 8.6 - 10.5 mg/dL    Total Protein 6.6 6.0 - 8.5 g/dL    Albumin 3.6 3.5 - 5.2 g/dL    ALT (SGPT) 12 1 - 33 U/L    AST (SGOT) 16 1 - 32 U/L    Alkaline Phosphatase 94 39 - 117 U/L    Total Bilirubin 0.3 0.0 - 1.2 mg/dL    Globulin 3.0 gm/dL    A/G Ratio 1.2 g/dL    BUN/Creatinine Ratio 14.8 7.0 - 25.0    Anion Gap 8.9 5.0 - 15.0 mmol/L    eGFR 88.6 >60.0 mL/min/1.73   Magnesium    Specimen: Blood   Result Value Ref Range    Magnesium 1.7 1.6 - 2.6 mg/dL   Single High Sensitivity Troponin T    Specimen: Blood   Result Value Ref Range    HS Troponin T 7 <14 ng/L   CBC Auto Differential    Specimen: Blood   Result Value Ref Range    WBC 5.19 3.40 - " 10.80 10*3/mm3    RBC 3.55 (L) 3.77 - 5.28 10*6/mm3    Hemoglobin 10.4 (L) 12.0 - 15.9 g/dL    Hematocrit 32.6 (L) 34.0 - 46.6 %    MCV 91.8 79.0 - 97.0 fL    MCH 29.3 26.6 - 33.0 pg    MCHC 31.9 31.5 - 35.7 g/dL    RDW 15.7 (H) 12.3 - 15.4 %    RDW-SD 51.7 37.0 - 54.0 fl    MPV 10.9 6.0 - 12.0 fL    Platelets 268 140 - 450 10*3/mm3    Neutrophil % 59.9 42.7 - 76.0 %    Lymphocyte % 30.6 19.6 - 45.3 %    Monocyte % 8.1 5.0 - 12.0 %    Eosinophil % 0.8 0.3 - 6.2 %    Basophil % 0.4 0.0 - 1.5 %    Immature Grans % 0.2 0.0 - 0.5 %    Neutrophils, Absolute 3.11 1.70 - 7.00 10*3/mm3    Lymphocytes, Absolute 1.59 0.70 - 3.10 10*3/mm3    Monocytes, Absolute 0.42 0.10 - 0.90 10*3/mm3    Eosinophils, Absolute 0.04 0.00 - 0.40 10*3/mm3    Basophils, Absolute 0.02 0.00 - 0.20 10*3/mm3    Immature Grans, Absolute 0.01 0.00 - 0.05 10*3/mm3    nRBC 0.0 0.0 - 0.2 /100 WBC   Scan Slide    Specimen: Blood   Result Value Ref Range    Anisocytosis Slight/1+ None Seen    WBC Morphology Normal Normal    Platelet Estimate Adequate Normal    Clumped Platelets Present None Seen   Urinalysis With Microscopic If Indicated (No Culture) - Urine, Clean Catch    Specimen: Urine, Clean Catch   Result Value Ref Range    Color, UA Dark Yellow (A) Yellow, Straw    Appearance, UA Cloudy (A) Clear    pH, UA 5.5 5.0 - 8.0    Specific Gravity, UA >1.030 (H) 1.005 - 1.030    Glucose, UA Negative Negative    Ketones, UA Negative Negative    Bilirubin, UA Negative Negative    Blood, UA Negative Negative    Protein, UA 30 mg/dL (1+) (A) Negative    Leuk Esterase, UA Trace (A) Negative    Nitrite, UA Negative Negative    Urobilinogen, UA 1.0 E.U./dL 0.2 - 1.0 E.U./dL   Lactic Acid, Plasma    Specimen: Blood   Result Value Ref Range    Lactate 1.7 0.5 - 2.0 mmol/L   Urinalysis, Microscopic Only - Urine, Clean Catch    Specimen: Urine, Clean Catch   Result Value Ref Range    RBC, UA None Seen None Seen, 0-2 /HPF    WBC, UA 6-10 (A) None Seen, 0-2 /HPF     Bacteria, UA 2+ (A) None Seen /HPF    Squamous Epithelial Cells, UA 7-12 (A) None Seen, 0-2 /HPF    Hyaline Casts, UA 0-2 None Seen /LPF    Mucus, UA Moderate/2+ (A) None Seen, Trace /HPF    Methodology Manual Light Microscopy    ECG 12 Lead ED Triage Standing Order; Syncope   Result Value Ref Range    QT Interval 384 ms    QTC Interval 430 ms   Green Top (Gel)   Result Value Ref Range    Extra Tube Hold for add-ons.    Lavender Top   Result Value Ref Range    Extra Tube hold for add-on    Gold Top - SST   Result Value Ref Range    Extra Tube Hold for add-ons.    Light Blue Top   Result Value Ref Range    Extra Tube Hold for add-ons.      Medications   sodium chloride 0.9 % flush 10 mL (has no administration in time range)   sodium chloride 0.9 % flush 10 mL (has no administration in time range)   sodium chloride 0.9 % flush 10 mL (has no administration in time range)   sodium chloride 0.9 % infusion 40 mL (has no administration in time range)   sennosides-docusate (PERICOLACE) 8.6-50 MG per tablet 2 tablet (has no administration in time range)     And   polyethylene glycol (MIRALAX) packet 17 g (has no administration in time range)     And   bisacodyl (DULCOLAX) EC tablet 5 mg (has no administration in time range)     And   bisacodyl (DULCOLAX) suppository 10 mg (has no administration in time range)     XR Chest 1 View    Result Date: 9/13/2024  Narrative: XR CHEST 1 VW Date of Exam: 9/13/2024 12:58 PM EDT Indication: Cough, persistent Cough cough Comparison: 5/31/2023 and prior Findings: Study limited by overlying support and monitoring apparatus. Lung volumes are low. Patient status post median sternotomy. The heart size is within normal limits for technique. Pulmonary vascularity appears grossly unremarkable in appearance. Dependent opacities and vascular crowding at the lung bases noted favored represent atelectasis. Findings slightly more prominent on the left than the right. Implantable rate monitoring device  overlies the lower chest. There is no acute osseous abnormality appreciated.     Impression: Impression: 1. Dependent opacities at the lung bases favored represent atelectasis. Pneumonia not excluded. Electronically Signed: Bony Espinoza MD  9/13/2024 1:20 PM EDT  Workstation ID: [id number]     MDM:    Procedures    All labs were reviewed and interpreted by me.  All X-rays impressions were independently interpreted by me.          SHARED VISIT ATTESTATION:    This visit was performed by both myself and an APC.  I performed the substantive portion of the medical decision making. The management plan was made or approved by me, and I take responsibility for patient management.           Evert Ramachandran MD  13:50 EDT  09/13/24         Evert Ramachandran MD  09/13/24 1350      Electronically signed by Evert Ramachandran MD at 09/13/24 1350       Andrzej Brown PA-C at 09/13/24 1019          Time: 10:19 AM EDT  Date of encounter:  9/13/2024  Independent Historian/Clinical History and Information was obtained by:   Patient    History is limited by: N/A    Chief Complaint: Syncope and Dizziness      History of Present Illness:  Patient is a 49 y.o. year old female who presents to the emergency department for evaluation of syncope and dizziness.  Patient reports that she was standing by a table today at work when she suddenly felt dizzy when she turned around and then felt dizzy and passed out.  Patient reports she has not had 1 of these episodes in a while.  Patient reports she cannot recall any specific signs or symptoms that indicates she is about to get dizzy.  Patient reports  That she is followed by cardiology.  Patient reports she is recently stopped taking her Seroquel and methocarbamol as they are too expensive.  Patient denies any fevers, chills, recent illness, nausea, vomiting, shortness of breath, urinary retention, dysuria, palpitations, chest pain, jaw pain.  Patient reports she does not check  "regularly check her blood sugar as she does not have a monitor at home.  Patient reports she has been taking all diabetes medications.  Bystanders report that she did not hit her head.  Patient denies any pain at this time.  Patient reports cough    Patient Care Team  Primary Care Provider: Adore Quispe APRN    Past Medical History:     No Known Allergies  Past Medical History:   Diagnosis Date    Acid reflux     Anxiety     Arthritis     Asthma     HAS INHALERS SCHEDULED AND PRN    Depression     Diabetes mellitus     AVERAGE AM 'S    Fibroid     H/O precordial chest pain     FOLLOWED BY DR HILL. DENIES CP/SOA. REPORTS WORKS FULL TIME IN [employer].    Hyperlipidemia     Hypertension     Implantable loop recorder present     Injury of back     Migraines     Pain management     [facility]    Sleep apnea     REPORTS IS SUPPOSED TO BE RECEIVING CPAP    Syncope      Past Surgical History:   Procedure Laterality Date    CARDIAC CATHETERIZATION N/A 08/12/2021    Procedure: Left Heart Cath - R radial;  Surgeon: NNAMDI Hill MD;  Location: [facility] CATH INVASIVE LOCATION;  Service: Cardiology;  Laterality: N/A;    CARDIAC ELECTROPHYSIOLOGY PROCEDURE N/A 1/11/2024    Procedure: Loop insertion;  Surgeon: NNAMDI Hill MD;  Location: [facility] CATH INVASIVE LOCATION;  Service: Cardiovascular;  Laterality: N/A;    CARDIAC SURGERY      \"repair of hole in heart\" HAD REPAIR OF ATRIAL-SEPTAL DEFECT IN 1980    HYSTERECTOMY      LAPAROSCOPIC TUBAL LIGATION      TOTAL HIP ARTHROPLASTY Right 5/11/2023    Procedure: RIGHT TOTAL HIP ARTHROPLASTY ANTERIOR;  Surgeon: Elias Gruber MD;  Location: [facility] MAIN OR;  Service: Orthopedics;  Laterality: Right;     Family History   Problem Relation Age of Onset    Hypertension Father     Diabetes Father     Cancer Father     Diabetes Mother     Hypertension Mother     Ovarian cancer Neg Hx     Uterine cancer Neg Hx     Breast cancer Neg Hx     Prostate cancer Neg Hx     Colon cancer " Neg Hx     Clotting disorder Neg Hx     Deep vein thrombosis Neg Hx     Pulmonary embolism Neg Hx        Home Medications:  Prior to Admission medications    Medication Sig Start Date End Date Taking? Authorizing Provider   albuterol sulfate  (90 Base) MCG/ACT inhaler Inhale 2 puffs Every 4 (Four) Hours As Needed.    Rama Sierra MD   amantadine (SYMMETREL) 100 MG tablet Take 1 tablet by mouth Daily. 2/16/23   Rama Sierra MD   amitriptyline (ELAVIL) 50 MG tablet Take 1 tablet by mouth Every Night.    Rama Sierra MD   ARIPiprazole (ABILIFY) 10 MG tablet Take 1 tablet by mouth Every Night.    aRma Sierra MD   aspirin 325 MG EC tablet Take 1 tablet by mouth Every 12 (Twelve) Hours. INSTRUCTED PER CARDIOLOGY TO CONTINUE TAKING FOR SURGERY 5/12/23   Rama Sierra MD   atorvastatin (LIPITOR) 20 MG tablet Take 1 tablet by mouth every night at bedtime. 1/18/23   Rama Sierra MD   Calcium 600+D3 600-20 MG-MCG tablet Take 1 tablet by mouth Daily. 2/13/23   Rmaa Sierra MD   carBAMazepine (CARBATROL) 100 MG 12 hr capsule TAKE 1 CAPSULE BY MOUTH TWICE DAILY X 2 WEEKS THEN TAKE 1 CAPSULE BY MOUTH EVERY DAY X 1 WEEK THEN STOP. 9/2/23   Rama Sierra MD   carvedilol (COREG) 12.5 MG tablet Take 1 tablet by mouth 2 (Two) Times a Day. 12/7/23   Marce Graham APRN   Cholecalciferol (Vitamin D3) 1.25 MG (31671 UT) capsule Take 1 capsule by mouth 1 (One) Time Per Week. TAKES ON FRIDAY 3/15/23   Rama Sierra MD   diclofenac (VOLTAREN) 50 MG EC tablet Take 1 tablet by mouth 2 (Two) Times a Day. 8/9/23   Luci Guaman PA-C   escitalopram (LEXAPRO) 10 MG tablet Take 1 tablet by mouth Daily.    Rama Sierra MD   gabapentin (NEURONTIN) 600 MG tablet Every 8 (Eight) Hours.    Rama Sierra MD   hydroCHLOROthiazide (HYDRODIURIL) 25 MG tablet Take 1 tablet by mouth Daily. 12/21/23   Marce Graham APRN   hydrOXYzine  (ATARAX) 50 MG tablet TAKE 1/2 TO 1 TABLET BY MOUTH EVERY 6 HOURS AS NEEDED FOR ITCHING 8/29/23   Rama Sierra MD   Jardiance 10 MG tablet tablet 1 tablet Daily. INSTRUCTED PER ANESTHESIA PROTOCOL 1/18/23   Rama Sierra MD   loratadine (CLARITIN) 10 MG tablet Take 1 tablet by mouth Daily. 8/29/23   Rama Sierra MD   melatonin 5 MG tablet tablet Take 1 tablet by mouth Every Night.    Rama Sierra MD   metFORMIN (GLUCOPHAGE) 500 MG tablet Take 1 tablet by mouth 2 (Two) Times a Day.    Rama Sierra MD   methocarbamol (ROBAXIN) 750 MG tablet Take 1 tablet by mouth 3 (Three) Times a Day. 3/22/23   Rama Sierra MD   montelukast (SINGULAIR) 10 MG tablet Take 1 tablet by mouth Every Night.    Rama Sierra MD   naloxone (NARCAN) 4 MG/0.1ML nasal spray Call 911. Don't prime. Carter in 1 nostril for overdose. Repeat in 2-3 minutes in other nostril if no or minimal breathing/responsiveness. 5/11/23   Elias Gruber MD   omeprazole (priLOSEC) 20 MG capsule Take 1 capsule by mouth Daily. 10/12/23   Rama Sierra MD   ondansetron ODT (ZOFRAN-ODT) 4 MG disintegrating tablet 1 tablet Every 8 (Eight) Hours As Needed for Nausea or Vomiting. 10/12/23   Rama Sierra MD   oxyCODONE-acetaminophen (PERCOCET) 5-325 MG per tablet Take 1 tablet by mouth 3 (Three) Times a Day. 6/2/23   Rama Sierra MD   potassium chloride 10 MEQ CR tablet Take 2 tablets by mouth 2 (Two) Times a Day. 7/30/21   NNAMDI Hill MD   prazosin (MINIPRESS) 1 MG capsule Take 1 capsule by mouth Every Night.    Rama Sierra MD   prazosin (MINIPRESS) 2 MG capsule Take 1 capsule by mouth Every Night. 6/22/21   Emergency, Nurse Epic, RN   QUEtiapine (SEROquel) 100 MG tablet Take 1 tablet by mouth Every Night. 5/9/23   Rama Sierra MD   Symbicort 80-4.5 MCG/ACT inhaler Inhale 2 puffs 2 (Two) Times a Day. 6/22/21   Rama Sierra MD   Vitamin E 180 MG (400 UNIT)  "capsule capsule Take 1 capsule by mouth Daily. 1/18/23   Provider, MD Rama        Social History:   Social History     Tobacco Use    Smoking status: Never     Passive exposure: Never    Smokeless tobacco: Never   Vaping Use    Vaping status: Never Used   Substance Use Topics    Alcohol use: Never    Drug use: Never         Review of Systems:  Review of Systems   Constitutional:  Negative for chills and fever.   HENT:  Negative for congestion, ear pain and sore throat.    Eyes:  Negative for pain.   Respiratory:  Positive for cough. Negative for chest tightness and shortness of breath.    Cardiovascular:  Negative for chest pain.   Gastrointestinal:  Negative for abdominal pain, diarrhea, nausea and vomiting.   Genitourinary:  Negative for flank pain and hematuria.   Musculoskeletal:  Negative for joint swelling.   Skin:  Negative for pallor.   Neurological:  Positive for syncope. Negative for seizures and headaches.   All other systems reviewed and are negative.       Physical Exam:  /59 (BP Location: Right arm, Patient Position: Lying)   Pulse 75   Temp 98.2 °F (36.8 °C) (Oral)   Resp 16   Ht 170.2 cm (67\")   Wt 74 kg (163 lb 2.3 oz)   SpO2 100%   BMI 25.55 kg/m²     Physical Exam  Vitals and nursing note reviewed.   Constitutional:       General: She is not in acute distress.     Appearance: Normal appearance. She is obese. She is not ill-appearing or toxic-appearing.   HENT:      Head: Normocephalic and atraumatic.   Eyes:      Extraocular Movements: Extraocular movements intact.   Cardiovascular:      Rate and Rhythm: Normal rate and regular rhythm.      Pulses: Normal pulses.      Heart sounds: Normal heart sounds. No murmur heard.     No friction rub.   Pulmonary:      Effort: Pulmonary effort is normal.      Breath sounds: Normal breath sounds. No wheezing, rhonchi or rales.   Abdominal:      General: Abdomen is flat.      Palpations: Abdomen is soft.   Skin:     General: Skin is warm. "   Neurological:      General: No focal deficit present.      Mental Status: She is alert and oriented to person, place, and time. Mental status is at baseline.   Psychiatric:         Mood and Affect: Mood normal.         Behavior: Behavior normal.         Thought Content: Thought content normal.         Judgment: Judgment normal.                  Procedures:  Procedures      Medical Decision Making:      Comorbidities that affect care:    Diabetes, Hypertension, Obesity    External Notes reviewed:    Previous ED Note: Last visit in May 31, 2023 for lightheadedness.  EKG was normal, sats were normal.  Blood pressures were normal., Previous EKG: Reviewed with no acute findings., and Previous Labs: Reviewed without acute findings.      The following orders were placed and all results were independently analyzed by me:  Orders Placed This Encounter   Procedures    Respiratory Culture - Sputum, Throat    Legionella Antigen, Urine - Urine, Urine, Clean Catch    S. Pneumo Ag Urine or CSF - Urine, Urine, Clean Catch    COVID PRE-OP / PRE-PROCEDURE SCREENING ORDER (NO ISOLATION) - Swab, Nasopharynx    COVID-19,CEPHEID/DARLIN,COR/MANASA/PAD/KAVIN/LAG/PENNIE IN-HOUSE,NP SWAB IN TRANSPORT MEDIA 1 HR TAT, RT-PCR - Swab, Nasopharynx    Blood Culture - Blood,    Blood Culture - Blood,    XR Chest 1 View    [place] Draw    Comprehensive Metabolic Panel    Magnesium    Single High Sensitivity Troponin T    CBC Auto Differential    Scan Slide    Urinalysis With Microscopic If Indicated (No Culture) - Urine, Clean Catch    Lactic Acid, Plasma    Urinalysis, Microscopic Only - Urine, Clean Catch    Procalcitonin    Diet: Regular/House; Fluid Consistency: Thin (IDDSI 0)    Undress & Gown    Continuous Pulse Oximetry    Vital Signs    Orthostatic Blood Pressure    Orthostatic Blood Pressure    Vital Signs    Intake & Output    Weigh Patient    Oral Care    Saline Lock & Maintain IV Access    Place Sequential Compression Device    Maintain  Sequential Compression Device    Inpatient Hospitalist Consult    Oxygen Therapy- Nasal Cannula; Titrate 1-6 LPM Per SpO2; 90 - 95%    POC Glucose Once    ECG 12 Lead ED Triage Standing Order; Syncope    Insert Peripheral IV    Insert Peripheral IV    Inpatient Admission    CBC & Differential    Green Top (Gel)    Lavender Top    Gold Top - SST    Light Blue Top       Medications Given in the Emergency Department:  Medications   sodium chloride 0.9 % flush 10 mL (has no administration in time range)   sodium chloride 0.9 % flush 10 mL (has no administration in time range)   sodium chloride 0.9 % flush 10 mL (has no administration in time range)   sodium chloride 0.9 % infusion 40 mL (has no administration in time range)   sennosides-docusate (PERICOLACE) 8.6-50 MG per tablet 2 tablet (has no administration in time range)     And   polyethylene glycol (MIRALAX) packet 17 g (has no administration in time range)     And   bisacodyl (DULCOLAX) EC tablet 5 mg (has no administration in time range)     And   bisacodyl (DULCOLAX) suppository 10 mg (has no administration in time range)   AZITHROMYCIN 500 MG/250 ML 0.9% NS IVPB (vial-mate) (has no administration in time range)        ED Course:    ED Course as of 09/13/24 1358   Fri Sep 13, 2024   1126 Urinalysis With Microscopic If Indicated (No Culture) - Urine, Clean Catch [CB]   1147 EKG:    Rhythm: Regular  Rate: Regular  Intervals: Within normal limits  T-wave: Precordial  ST Segment: Within normal limits    EKG Comparison: EKG shows no signs of ST depressions or elevations.  Evidence of diffuse T wave inversion consistent with prior EKGs    Interpreted by me  [CB]   1212 Mucus, UA(!): Moderate/2+ [CB]   1336 XR Chest 1 View [CB]      ED Course User Index  [CB] Andrzej Brown, PARyleeC       Labs:    Lab Results (last 24 hours)       Procedure Component Value Units Date/Time    CBC & Differential [[id number]]  (Abnormal) Collected: 09/13/24 1049    Specimen: Blood  Updated: 09/13/24 1121    Narrative:      The following orders were created for panel order CBC & Differential.  Procedure                               Abnormality         Status                     ---------                               -----------         ------                     CBC Auto Differential[[id number]]        Abnormal            Final result               Scan Slide[[id number]]                                       Final result                 Please view results for these tests on the individual orders.    Comprehensive Metabolic Panel [[id number]]  (Abnormal) Collected: 09/13/24 1049    Specimen: Blood Updated: 09/13/24 1112     Glucose 131 mg/dL      BUN 12 mg/dL      Creatinine 0.81 mg/dL      Sodium 139 mmol/L      Potassium 3.2 mmol/L      Comment: Slight hemolysis detected by analyzer. Result may be falsely elevated.        Chloride 103 mmol/L      CO2 27.1 mmol/L      Calcium 9.1 mg/dL      Total Protein 6.6 g/dL      Albumin 3.6 g/dL      ALT (SGPT) 12 U/L      AST (SGOT) 16 U/L      Alkaline Phosphatase 94 U/L      Total Bilirubin 0.3 mg/dL      Globulin 3.0 gm/dL      A/G Ratio 1.2 g/dL      BUN/Creatinine Ratio 14.8     Anion Gap 8.9 mmol/L      eGFR 88.6 mL/min/1.73     Narrative:      GFR Normal >60  Chronic Kidney Disease <60  Kidney Failure <15      Magnesium [[id number]]  (Normal) Collected: 09/13/24 1049    Specimen: Blood Updated: 09/13/24 1112     Magnesium 1.7 mg/dL     Single High Sensitivity Troponin T [[id number]]  (Normal) Collected: 09/13/24 1049    Specimen: Blood Updated: 09/13/24 1112     HS Troponin T 7 ng/L     Narrative:      High Sensitive Troponin T Reference Range:  <14.0 ng/L- Negative Female for AMI  <22.0 ng/L- Negative Male for AMI  >=14 - Abnormal Female indicating possible myocardial injury.  >=22 - Abnormal Male indicating possible myocardial injury.   Clinicians would have to utilize clinical acumen, EKG, Troponin, and serial changes to determine if it is an  Acute Myocardial Infarction or myocardial injury due to an underlying chronic condition.         CBC Auto Differential [[id number]]  (Abnormal) Collected: 09/13/24 1049    Specimen: Blood Updated: 09/13/24 1121     WBC 5.19 10*3/mm3      RBC 3.55 10*6/mm3      Hemoglobin 10.4 g/dL      Hematocrit 32.6 %      MCV 91.8 fL      MCH 29.3 pg      MCHC 31.9 g/dL      RDW 15.7 %      RDW-SD 51.7 fl      MPV 10.9 fL      Platelets 268 10*3/mm3      Neutrophil % 59.9 %      Lymphocyte % 30.6 %      Monocyte % 8.1 %      Eosinophil % 0.8 %      Basophil % 0.4 %      Immature Grans % 0.2 %      Neutrophils, Absolute 3.11 10*3/mm3      Lymphocytes, Absolute 1.59 10*3/mm3      Monocytes, Absolute 0.42 10*3/mm3      Eosinophils, Absolute 0.04 10*3/mm3      Basophils, Absolute 0.02 10*3/mm3      Immature Grans, Absolute 0.01 10*3/mm3      nRBC 0.0 /100 WBC     Scan Slide [[id number]] Collected: 09/13/24 1049    Specimen: Blood Updated: 09/13/24 1121     Anisocytosis Slight/1+     WBC Morphology Normal     Platelet Estimate Adequate     Clumped Platelets Present    Procalcitonin [[id number]] Collected: 09/13/24 1049    Specimen: Blood Updated: 09/13/24 1348    Urinalysis With Microscopic If Indicated (No Culture) - Urine, Clean Catch [[id number]]  (Abnormal) Collected: 09/13/24 1140    Specimen: Urine, Clean Catch Updated: 09/13/24 1203     Color, UA Dark Yellow     Appearance, UA Cloudy     pH, UA 5.5     Specific Gravity, UA >1.030     Glucose, UA Negative     Ketones, UA Negative     Bilirubin, UA Negative     Blood, UA Negative     Protein, UA 30 mg/dL (1+)     Leuk Esterase, UA Trace     Nitrite, UA Negative     Urobilinogen, UA 1.0 E.U./dL    Lactic Acid, Plasma [[id number]]  (Normal) Collected: 09/13/24 1140    Specimen: Blood Updated: 09/13/24 1219     Lactate 1.7 mmol/L     Urinalysis, Microscopic Only - Urine, Clean Catch [[id number]]  (Abnormal) Collected: 09/13/24 1140    Specimen: Urine, Clean Catch Updated: 09/13/24 120      RBC, UA None Seen /HPF      WBC, UA 6-10 /HPF      Bacteria, UA 2+ /HPF      Squamous Epithelial Cells, UA 7-12 /HPF      Hyaline Casts, UA 0-2 /LPF      Mucus, UA Moderate/2+ /HPF      Methodology Manual Light Microscopy             Imaging:    XR Chest 1 View    Result Date: 9/13/2024  XR CHEST 1 VW Date of Exam: 9/13/2024 12:58 PM EDT Indication: Cough, persistent Cough cough Comparison: 5/31/2023 and prior Findings: Study limited by overlying support and monitoring apparatus. Lung volumes are low. Patient status post median sternotomy. The heart size is within normal limits for technique. Pulmonary vascularity appears grossly unremarkable in appearance. Dependent opacities and vascular crowding at the lung bases noted favored represent atelectasis. Findings slightly more prominent on the left than the right. Implantable rate monitoring device overlies the lower chest. There is no acute osseous abnormality appreciated.     Impression: 1. Dependent opacities at the lung bases favored represent atelectasis. Pneumonia not excluded. Electronically Signed: Bony Espinoza MD  9/13/2024 1:20 PM EDT  Workstation ID: [id number]       Differential Diagnosis and Discussion:    Dizziness: Based on the patient's history, signs, and symptoms, the diffential diagnosis includes but is not limited to meningitis, stroke, sepsis, subarachnoid hemorrhage, intracranial bleeding, encephalitis, vertigo, electrolyte imbalance, and metabolic disorders.  Syncope: Differential diagnosis includes but is not limited to TIA, hyperventilation, aortic stenosis, pulmonary emboli, myocardial disease, bradycardia arrhythmia, heart block, tachyarrhythmia, vasovagal, orthostatic hypotension, ruptured AAA, aortic dissection, subarachnoid hemorrhage, seizure, hypoglycemia.    All labs were reviewed and interpreted by me.  All X-rays impressions were independently interpreted by me.  EKG was interpreted by me.    MDM     Amount and/or Complexity of  Data Reviewed  Decide to obtain previous medical records or to obtain history from someone other than the patient: yes       The patient´s CBC that was reviewed and interpreted by me shows no abnormalities of critical concern. Of note, there is no anemia requiring a blood transfusion and the platelet count is acceptable.  The patient´s CMP that was reviewed and interpretted by me shows no abnormalities of critical concern. Of note, the patient´s sodium and potassium are acceptable. The patient´s liver enzymes are unremarkable. The patient´s renal function (creatinine) is preserved. The patient has a normal anion gap.  Checks x-ray reveals infiltrates.  Patient was given Rocephin and oral Zithromax.        Sepsis was not present during time in the emergency department.    Patient Care Considerations:    PERC: I used the PERC score to risk stratify the patient for PE and a CT of the chest was considered but ultimately not indicated in today's visit.      Consultants/Shared Management Plan:    Case was discussed with Dr. Montiel who agrees with admission.    Social Determinants of Health:    Patient is independent, reliable, and has access to care.       Disposition and Care Coordination:    Admit:   Through independent evaluation of the patient's history, physical, and imperical data, the patient meets criteria for inpatient admission to the hospital.        Final diagnoses:   Syncope and collapse        ED Disposition       ED Disposition   Decision to Admit    Condition   --    Comment   Level of Care: Progressive Care [20]   Diagnosis: Syncope [017576]   Admitting Physician: ENEIDA MONTIEL [[id number]]   Attending Physician: ENEIDA MONTIEL [[id number]]   Certification: I Certify That Inpatient Hospital Services Are Medically Necessary For Greater Than 2 Midnights                 This medical record created using voice recognition software.             Andrzej Brown PA-C  09/13/24 1358      Electronically signed by  Andrzej Brown PA-C at 09/13/24 1358       Current Facility-Administered Medications   Medication Dose Route Frequency Provider Last Rate Last Admin    AZITHROMYCIN 500 MG/250 ML 0.9% NS IVPB (vial-mate)  500 mg Intravenous Q24H Sutherland, Dimpi, DO   500 mg at 09/13/24 1427    sennosides-docusate (PERICOLACE) 8.6-50 MG per tablet 2 tablet  2 tablet Oral BID Sutherland, Dimpi, DO        And    polyethylene glycol (MIRALAX) packet 17 g  17 g Oral Daily PRN Sutherland, Dimpi, DO        And    bisacodyl (DULCOLAX) EC tablet 5 mg  5 mg Oral Daily PRN Sutherland, Dimpi, DO        And    bisacodyl (DULCOLAX) suppository 10 mg  10 mg Rectal Daily PRN Sutherland, Dimpi, DO        sodium chloride 0.9 % flush 10 mL  10 mL Intravenous PRN Evert Ramachandran MD        sodium chloride 0.9 % flush 10 mL  10 mL Intravenous Q12H Sutherland, Dimpi, DO        sodium chloride 0.9 % flush 10 mL  10 mL Intravenous PRN Sutherland, Dimpi, DO        sodium chloride 0.9 % infusion 40 mL  40 mL Intravenous PRN Sutherland, Dimpi, DO         Current Outpatient Medications   Medication Sig Dispense Refill    gabapentin (NEURONTIN) 600 MG tablet Take 1 tablet by mouth 3 (Three) Times a Day.      oxyCODONE-acetaminophen (PERCOCET) 5-325 MG per tablet Take 1 tablet by mouth 3 (Three) Times a Day.      albuterol sulfate  (90 Base) MCG/ACT inhaler Inhale 2 puffs Every 4 (Four) Hours As Needed.      amitriptyline (ELAVIL) 50 MG tablet Take 1 tablet by mouth Every Night.      ARIPiprazole (ABILIFY) 10 MG tablet Take 1 tablet by mouth Every Night.      atorvastatin (LIPITOR) 20 MG tablet Take 1 tablet by mouth every night at bedtime.      carvedilol (COREG) 12.5 MG tablet Take 1 tablet by mouth 2 (Two) Times a Day. 180 tablet 3    Cholecalciferol (Vitamin D3) 1.25 MG (44995 UT) capsule Take 1 capsule by mouth 1 (One) Time Per Week. TAKES ON FRIDAY      escitalopram (LEXAPRO) 10 MG tablet Take 1.5 tablets by mouth Daily.      hydroCHLOROthiazide (HYDRODIURIL) 25 MG tablet Take 1  tablet by mouth Daily. 90 tablet 3    hydrOXYzine (ATARAX) 50 MG tablet Take 0.5-1 tablets by mouth Every 6 (Six) Hours As Needed.      Jardiance 10 MG tablet tablet Take 1 tablet by mouth Daily. INSTRUCTED PER ANESTHESIA PROTOCOL      loratadine (CLARITIN) 10 MG tablet Take 1 tablet by mouth Daily.      melatonin 5 MG tablet tablet Take 1 tablet by mouth Every Night.      metFORMIN (GLUCOPHAGE) 500 MG tablet Take 1 tablet by mouth 2 (Two) Times a Day.      methocarbamol (ROBAXIN) 750 MG tablet Take 1 tablet by mouth 3 (Three) Times a Day.      montelukast (SINGULAIR) 10 MG tablet Take 1 tablet by mouth Every Night.      naloxone (NARCAN) 4 MG/0.1ML nasal spray Call 911. Don't prime. [place] in 1 nostril for overdose. Repeat in 2-3 minutes in other nostril if no or minimal breathing/responsiveness. 2 each 0    omeprazole (priLOSEC) 20 MG capsule Take 1 capsule by mouth Daily.      prazosin (MINIPRESS) 1 MG capsule Take 1 capsule by mouth Every Night.      prazosin (MINIPRESS) 2 MG capsule Take 1 capsule by mouth Every Night.      QUEtiapine (SEROquel) 100 MG tablet Take 1 tablet by mouth Every Night.      Symbicort 80-4.5 MCG/ACT inhaler Inhale 2 puffs 2 (Two) Times a Day.      Vitamin E 180 MG (400 UNIT) capsule capsule Take 1 capsule by mouth Daily.       Lab Results (last 24 hours)       Procedure Component Value Units Date/Time    COVID PRE-OP / PRE-PROCEDURE SCREENING ORDER (NO ISOLATION) - Swab, Nasopharynx [[id number]] Collected: 09/13/24 1426    Specimen: Swab from Nasopharynx Updated: 09/13/24 1434    Narrative:      The following orders were created for panel order COVID PRE-OP / PRE-PROCEDURE SCREENING ORDER (NO ISOLATION) - Swab, Nasopharynx.  Procedure                               Abnormality         Status                     ---------                               -----------         ------                     COVID-19,CEPHEID/DARLIN,CO...[[id number]]                      In process                "    Please view results for these tests on the individual orders.    COVID-19,CEPHEID/DARLIN,COR/MANASA/PAD/KAVIN/LAG/PENNIE IN-HOUSE,NP SWAB IN TRANSPORT MEDIA 1 HR TAT, RT-PCR - Swab, Nasopharynx [[id number]] Collected: 09/13/24 1426    Specimen: Swab from Nasopharynx Updated: 09/13/24 1434    Blood Culture - Blood, Arm, Right [[id number]] Collected: 09/13/24 1426    Specimen: Blood from Arm, Right Updated: 09/13/24 1434    Blood Culture - Blood, Arm, Left [[id number]] Collected: 09/13/24 1426    Specimen: Blood from Arm, Left Updated: 09/13/24 1434    Procalcitonin [[id number]]  (Normal) Collected: 09/13/24 1049    Specimen: Blood Updated: 09/13/24 1409     Procalcitonin 0.03 ng/mL     Narrative:      As a Marker for Sepsis (Non-Neonates):    1. <0.5 ng/mL represents a low risk of severe sepsis and/or septic shock.  2. >2 ng/mL represents a high risk of severe sepsis and/or septic shock.    As a Marker for Lower Respiratory Tract Infections that require antibiotic therapy:    PCT on Admission    Antibiotic Therapy       6-12 Hrs later    >0.5                Strongly Recommended  >0.25 - <0.5        Recommended  0.1 - 0.25          Discouraged              Remeasure/reassess PCT  <0.1                Strongly Discouraged     Remeasure/reassess PCT    As 28 day mortality risk marker: \"Change in Procalcitonin Result\" (>80% or <=80%) if Day 0 (or Day 1) and Day 4 values are available. Refer to http://[email or web address]-pct-calculator.com    Change in PCT <=80%  A decrease of PCT levels below or equal to 80% defines a positive change in PCT test result representing a higher risk for 28-day all-cause mortality of patients diagnosed with severe sepsis for septic shock.    Change in PCT >80%  A decrease of PCT levels of more than 80% defines a negative change in PCT result representing a lower risk for 28-day all-cause mortality of patients diagnosed with severe sepsis or septic shock.    This test is Prognostic not Diagnostic, if elevated " correlate with clinical findings before administering antibiotic treatment.        Lactic Acid, Plasma [[id number]]  (Normal) Collected: 09/13/24 1140    Specimen: Blood Updated: 09/13/24 1219     Lactate 1.7 mmol/L     Urinalysis With Microscopic If Indicated (No Culture) - Urine, Clean Catch [[id number]]  (Abnormal) Collected: 09/13/24 1140    Specimen: Urine, Clean Catch Updated: 09/13/24 1203     Color, UA Dark Yellow     Appearance, UA Cloudy     pH, UA 5.5     Specific Gravity, UA >1.030     Glucose, UA Negative     Ketones, UA Negative     Bilirubin, UA Negative     Blood, UA Negative     Protein, UA 30 mg/dL (1+)     Leuk Esterase, UA Trace     Nitrite, UA Negative     Urobilinogen, UA 1.0 E.U./dL    Urinalysis, Microscopic Only - Urine, Clean Catch [[id number]]  (Abnormal) Collected: 09/13/24 1140    Specimen: Urine, Clean Catch Updated: 09/13/24 1203     RBC, UA None Seen /HPF      WBC, UA 6-10 /HPF      Bacteria, UA 2+ /HPF      Squamous Epithelial Cells, UA 7-12 /HPF      Hyaline Casts, UA 0-2 /LPF      Mucus, UA Moderate/2+ /HPF      Methodology Manual Light Microscopy    CBC & Differential [[id number]]  (Abnormal) Collected: 09/13/24 1049    Specimen: Blood Updated: 09/13/24 1121    Narrative:      The following orders were created for panel order CBC & Differential.  Procedure                               Abnormality         Status                     ---------                               -----------         ------                     CBC Auto Differential[[id number]]        Abnormal            Final result               Scan Slide[[id number]]                                       Final result                 Please view results for these tests on the individual orders.    CBC Auto Differential [[id number]]  (Abnormal) Collected: 09/13/24 1049    Specimen: Blood Updated: 09/13/24 1121     WBC 5.19 10*3/mm3      RBC 3.55 10*6/mm3      Hemoglobin 10.4 g/dL      Hematocrit 32.6 %      MCV 91.8 fL      MCH  29.3 pg      MCHC 31.9 g/dL      RDW 15.7 %      RDW-SD 51.7 fl      MPV 10.9 fL      Platelets 268 10*3/mm3      Neutrophil % 59.9 %      Lymphocyte % 30.6 %      Monocyte % 8.1 %      Eosinophil % 0.8 %      Basophil % 0.4 %      Immature Grans % 0.2 %      Neutrophils, Absolute 3.11 10*3/mm3      Lymphocytes, Absolute 1.59 10*3/mm3      Monocytes, Absolute 0.42 10*3/mm3      Eosinophils, Absolute 0.04 10*3/mm3      Basophils, Absolute 0.02 10*3/mm3      Immature Grans, Absolute 0.01 10*3/mm3      nRBC 0.0 /100 WBC     Scan Slide [[id number]] Collected: 09/13/24 1049    Specimen: Blood Updated: 09/13/24 1121     Anisocytosis Slight/1+     WBC Morphology Normal     Platelet Estimate Adequate     Clumped Platelets Present    Comprehensive Metabolic Panel [[id number]]  (Abnormal) Collected: 09/13/24 1049    Specimen: Blood Updated: 09/13/24 1112     Glucose 131 mg/dL      BUN 12 mg/dL      Creatinine 0.81 mg/dL      Sodium 139 mmol/L      Potassium 3.2 mmol/L      Comment: Slight hemolysis detected by analyzer. Result may be falsely elevated.        Chloride 103 mmol/L      CO2 27.1 mmol/L      Calcium 9.1 mg/dL      Total Protein 6.6 g/dL      Albumin 3.6 g/dL      ALT (SGPT) 12 U/L      AST (SGOT) 16 U/L      Alkaline Phosphatase 94 U/L      Total Bilirubin 0.3 mg/dL      Globulin 3.0 gm/dL      A/G Ratio 1.2 g/dL      BUN/Creatinine Ratio 14.8     Anion Gap 8.9 mmol/L      eGFR 88.6 mL/min/1.73     Narrative:      GFR Normal >60  Chronic Kidney Disease <60  Kidney Failure <15      Magnesium [[id number]]  (Normal) Collected: 09/13/24 1049    Specimen: Blood Updated: 09/13/24 1112     Magnesium 1.7 mg/dL     Single High Sensitivity Troponin T [[id number]]  (Normal) Collected: 09/13/24 1049    Specimen: Blood Updated: 09/13/24 1112     HS Troponin T 7 ng/L     Narrative:      High Sensitive Troponin T Reference Range:  <14.0 ng/L- Negative Female for AMI  <22.0 ng/L- Negative Male for AMI  >=14 - Abnormal Female  indicating possible myocardial injury.  >=22 - Abnormal Male indicating possible myocardial injury.   Clinicians would have to utilize clinical acumen, EKG, Troponin, and serial changes to determine if it is an Acute Myocardial Infarction or myocardial injury due to an underlying chronic condition.         [place] Draw [[id number]] Collected: 09/13/24 1049    Specimen: Blood Updated: 09/13/24 1101    Narrative:      The following orders were created for panel order [place] Draw.  Procedure                               Abnormality         Status                     ---------                               -----------         ------                     Green Top (Gel)[[id number]]                                  Final result               Lavender Top[[id number]]                                     Final result               Gold Top - SST[[id number]]                                   Final result               Light Blue Top[[id number]]                                   Final result                 Please view results for these tests on the individual orders.    Lavender Top [[id number]] Collected: 09/13/24 1049    Specimen: Blood Updated: 09/13/24 1101     Extra Tube hold for add-on     Comment: Auto resulted       Light Blue Top [[id number]] Collected: 09/13/24 1049    Specimen: Blood Updated: 09/13/24 1101     Extra Tube Hold for add-ons.     Comment: Auto resulted       Gold Top - SST [[id number]] Collected: 09/13/24 1049    Specimen: Blood Updated: 09/13/24 1055     Extra Tube Hold for add-ons.     Comment: Auto resulted.       Green Top (Gel) [[id number]] Collected: 09/13/24 1049    Specimen: Blood Updated: 09/13/24 1055     Extra Tube Hold for add-ons.     Comment: Auto resulted.             Imaging Results (Last 24 Hours)       Procedure Component Value Units Date/Time    XR Chest 1 View [[id number]] Collected: 09/13/24 1318     Updated: 09/13/24 1323    Narrative:      XR CHEST 1 VW    Date of Exam: 9/13/2024 12:58 PM  "EDT    Indication: Cough, persistent  Cough  cough    Comparison: 5/31/2023 and prior    Findings:  Study limited by overlying support and monitoring apparatus. Lung volumes are low. Patient status post median sternotomy. The heart size is within normal limits for technique. Pulmonary vascularity appears grossly unremarkable in appearance. Dependent   opacities and vascular crowding at the lung bases noted favored represent atelectasis. Findings slightly more prominent on the left than the right. Implantable rate monitoring device overlies the lower chest. There is no acute osseous abnormality   appreciated.      Impression:      Impression:    1. Dependent opacities at the lung bases favored represent atelectasis. Pneumonia not excluded.      Electronically Signed: Bony Espinoza MD    9/13/2024 1:20 PM EDT    Workstation ID: [id number]          ECG/EMG Results (last 24 hours)       Procedure Component Value Units Date/Time    ECG 12 Lead ED Triage Standing Order; Syncope [[id number]] Collected: 09/13/24 1032     Updated: 09/13/24 1034     QT Interval 384 ms      QTC Interval 430 ms     Narrative:      HEART RATE=75  bpm  RR Pcpsaaeo=598  ms  OR Iefzqhhx=319  ms  P Horizontal Axis=-38  deg  P Front Axis=73  deg  QRSD Dpkjsvmb=628  ms  QT Frbkfnkz=238  ms  BRdO=676  ms  QRS Axis=57  deg  T Wave Axis=168  deg  - ABNORMAL ECG -  Sinus rhythm  Probable left atrial enlargement  Nonspecific intraventricular conduction delay  Low voltage, precordial leads  Abnrm T, consider ischemia, anterolateral lds  When compared with ECG of 31-May-2023 14:00:14,  Significant repolarization change  Significant axis, voltage or hypertrophy change  Date and Time of Study:2024-09-13 10:32:06          Orders (last 24 hrs)        Start     Ordered    09/13/24 2100  sennosides-docusate (PERICOLACE) 8.6-50 MG per tablet 2 tablet  2 Times Daily        Placed in \"And\" Linked Group    09/13/24 1346    09/13/24 1800  Oral Care  2 Times Daily       " "09/13/24 1346    09/13/24 1600  Vital Signs  Every 4 Hours       09/13/24 1346    09/13/24 1446  Initiate Observation Status  Once         09/13/24 1446    09/13/24 1415  sodium chloride 0.9 % flush 10 mL  Every 12 Hours Scheduled         09/13/24 1346    09/13/24 1415  AZITHROMYCIN 500 MG/250 ML 0.9% NS IVPB (vial-mate)  Every 24 Hours         09/13/24 1350    09/13/24 1351  Reason for Discontinuing Lactic Acid: Not Septic  Once         09/13/24 1350    09/13/24 1351  Blood Culture - Blood, Arm, Left  Once        Placed in \"And\" Linked Group    09/13/24 1350    09/13/24 1351  Blood Culture - Blood, Arm, Right  Once        Placed in \"And\" Linked Group    09/13/24 1350    09/13/24 1350  COVID PRE-OP / PRE-PROCEDURE SCREENING ORDER (NO ISOLATION) - Swab, Nasopharynx  Once         09/13/24 1349    09/13/24 1350  COVID-19,CEPHEID/DARLIN,COR/MANASA/PAD/KAVIN/LAG/PENNIE IN-HOUSE,NP SWAB IN TRANSPORT MEDIA 1 HR TAT, RT-PCR - Swab, Nasopharynx  PROCEDURE ONCE         09/13/24 1349    09/13/24 1347  Respiratory Culture - Sputum, Throat  Once         09/13/24 1346    09/13/24 1347  Procalcitonin  Once         09/13/24 1346    09/13/24 1347  Legionella Antigen, Urine - Urine, Urine, Clean Catch  Once         09/13/24 1346    09/13/24 1347  S. Pneumo Ag Urine or CSF - Urine, Urine, Clean Catch  Once         09/13/24 1346    09/13/24 1346  polyethylene glycol (MIRALAX) packet 17 g  Daily PRN        Placed in \"And\" Linked Group    09/13/24 1346    09/13/24 1346  bisacodyl (DULCOLAX) EC tablet 5 mg  Daily PRN        Placed in \"And\" Linked Group    09/13/24 1346    09/13/24 1346  bisacodyl (DULCOLAX) suppository 10 mg  Daily PRN        Placed in \"And\" Linked Group    09/13/24 1346    09/13/24 1346  Place Sequential Compression Device  Once         09/13/24 1346    09/13/24 1346  Maintain Sequential Compression Device  Continuous         09/13/24 1346    09/13/24 1346  Diet: Regular/House; Fluid Consistency: Thin (IDDSI 0)  Diet Effective Now "         09/13/24 1346    09/13/24 1345  Intake & Output  Every Shift       09/13/24 1346    09/13/24 1345  Weigh Patient  Once         09/13/24 1346    09/13/24 1345  Insert Peripheral IV  Once         09/13/24 1346    09/13/24 1345  Saline Lock & Maintain IV Access  Continuous         09/13/24 1346    09/13/24 1345  Inpatient Admission  Once         09/13/24 1346    09/13/24 1344  sodium chloride 0.9 % flush 10 mL  As Needed         09/13/24 1346    09/13/24 1344  sodium chloride 0.9 % infusion 40 mL  As Needed         09/13/24 1346    09/13/24 1339  Inpatient Hospitalist Consult  Once        Specialty:  Hospitalist  Provider:  Brett Sutherland,     09/13/24 1338    09/13/24 1236  XR Chest 1 View  1 Time Imaging         09/13/24 1235    09/13/24 1149  Urinalysis, Microscopic Only - Urine, Clean Catch  Once         09/13/24 1148    09/13/24 1110  Lactic Acid, Plasma  STAT         09/13/24 1109    09/13/24 1109  Urinalysis With Microscopic If Indicated (No Culture) - Urine, Clean Catch  Once         09/13/24 1108    09/13/24 1109  Orthostatic Blood Pressure  Once        Comments: Do blood pressure and heart rate    09/13/24 1108    09/13/24 1056  Scan Slide  Once         09/13/24 1056    09/13/24 1016  NPO Diet NPO Type: Strict NPO  Diet Effective Now,   Status:  Canceled         09/13/24 1015    09/13/24 1016  Undress & Gown  Once         09/13/24 1015    09/13/24 1016  Cardiac Monitoring  Continuous        Comments: Follow Standing Orders As Outlined in Process Instructions (Open Order Report to View Full Instructions)    09/13/24 1015    09/13/24 1016  Continuous Pulse Oximetry  Continuous         09/13/24 1015    09/13/24 1016  Vital Signs  Per Hospital Policy         09/13/24 1015    09/13/24 1016  Orthostatic Blood Pressure  Once         09/13/24 1015    09/13/24 1016  ECG 12 Lead ED Triage Standing Order; Syncope  Once         09/13/24 1015    09/13/24 1016  POC Glucose Once  Once        Comments: Complete no  more than 45 minutes prior to patient eating      09/13/24 1015    09/13/24 1016  Insert Peripheral IV  Once         09/13/24 1015    09/13/24 1016  [place] Draw  Once         09/13/24 1015    09/13/24 1016  CBC & Differential  Once         09/13/24 1015    09/13/24 1016  Comprehensive Metabolic Panel  Once         09/13/24 1015    09/13/24 1016  Magnesium  Once         09/13/24 1015    09/13/24 1016  Single High Sensitivity Troponin T  Once         09/13/24 1015    09/13/24 1016  Green Top (Gel)  PROCEDURE ONCE         09/13/24 1016    09/13/24 1016  Lavender Top  PROCEDURE ONCE         09/13/24 1016    09/13/24 1016  Gold Top - SST  PROCEDURE ONCE         09/13/24 1016    09/13/24 1016  Light Blue Top  PROCEDURE ONCE         09/13/24 1016    09/13/24 1016  CBC Auto Differential  PROCEDURE ONCE         09/13/24 1016    09/13/24 1015  sodium chloride 0.9 % flush 10 mL  As Needed         09/13/24 1015    Unscheduled  Oxygen Therapy- Nasal Cannula; Titrate 1-6 LPM Per SpO2; 90 - 95%  Continuous PRN       09/13/24 1015

## 2024-09-13 NOTE — H&P
[facility]  Date: 2024   Patient Name: Casi Lopez  : 1975  MRN: [id number]  Primary Care Physician:  Adore Quispe APRN  Date of admission: 2024    Subjective dizziness, syncope x 3  Subjective     Chief Complaint: Dizziness, syncope x 3    HPI: Patient is a 49-year-old female who presents to the ED for evaluation of syncope 3 times at work today at the [employer].  Patient was standing when she had her first syncopal event, her other 2 syncopal events happened when she was sitting.  She was not confused after passing out.  She did not lose bowel or urinary continence.  She did say however her feet were shaking.    She also reports cough.  She denies any fever nausea or vomiting.    On arrival to the ED, patient's temperature 98.2, pulse 75, respiratory to 16, blood pressure 110/59, she saturating 100% on room air.    Patient's high-sensitivity troponin is 7, potassium is 3.2, glucose is 131, lactate is 1.7, procalcitonin is 0.03, hemoglobin is 10.4, platelets are 268.    Patient tested positive for COVID.    Personal History     Past Medical History:  Past Medical History:   Diagnosis Date    Acid reflux     Anxiety     Arthritis     Asthma     HAS INHALERS SCHEDULED AND PRN    Depression     Diabetes mellitus     AVERAGE AM 'S    Fibroid     H/O precordial chest pain     FOLLOWED BY DR HILL. DENIES CP/SOA. REPORTS WORKS FULL TIME IN [place].    Hyperlipidemia     Hypertension     Implantable loop recorder present     Injury of back     Migraines     Pain management     Critical access hospital    Sleep apnea     REPORTS IS SUPPOSED TO BE RECEIVING CPAP    Syncope        Past Surgical History:  Past Surgical History:   Procedure Laterality Date    CARDIAC CATHETERIZATION N/A 2021    Procedure: Left Heart Cath - R radial;  Surgeon: NNAMDI Hill MD;  Location: [facility] CATH INVASIVE LOCATION;  Service: Cardiology;  Laterality: N/A;    CARDIAC  "ELECTROPHYSIOLOGY PROCEDURE N/A 1/11/2024    Procedure: Loop insertion;  Surgeon: NNAMDI Hill MD;  Location: [facility] CATH INVASIVE LOCATION;  Service: Cardiovascular;  Laterality: N/A;    CARDIAC SURGERY      \"repair of hole in heart\" HAD REPAIR OF ATRIAL-SEPTAL DEFECT IN 1980    HYSTERECTOMY      LAPAROSCOPIC TUBAL LIGATION      TOTAL HIP ARTHROPLASTY Right 5/11/2023    Procedure: RIGHT TOTAL HIP ARTHROPLASTY ANTERIOR;  Surgeon: Elias Gruber MD;  Location: [facility] MAIN OR;  Service: Orthopedics;  Laterality: Right;       Family History:   Family History   Problem Relation Age of Onset    Hypertension Father     Diabetes Father     Cancer Father     Diabetes Mother     Hypertension Mother     Ovarian cancer Neg Hx     Uterine cancer Neg Hx     Breast cancer Neg Hx     Prostate cancer Neg Hx     Colon cancer Neg Hx     Clotting disorder Neg Hx     Deep vein thrombosis Neg Hx     Pulmonary embolism Neg Hx        Social History:   Social History     Socioeconomic History    Marital status:    Tobacco Use    Smoking status: Never     Passive exposure: Never    Smokeless tobacco: Never   Vaping Use    Vaping status: Never Used   Substance and Sexual Activity    Alcohol use: Never    Drug use: Never    Sexual activity: Not Currently     Birth control/protection: Hysterectomy       Home Medications:  ARIPiprazole, QUEtiapine, Vitamin D3, Vitamin E, albuterol sulfate HFA, amitriptyline, atorvastatin, budesonide-formoterol, carvedilol, empagliflozin, escitalopram, gabapentin, hydrOXYzine, hydroCHLOROthiazide, loratadine, melatonin, metFORMIN, methocarbamol, montelukast, naloxone, omeprazole, oxyCODONE-acetaminophen, and prazosin    Allergies:  No Known Allergies    Review of Systems   All systems were reviewed and negative except for: Recurrent syncope    Objective   Objective     Vitals:   Temp:  [98.2 °F (36.8 °C)] 98.2 °F (36.8 °C)  Heart Rate:  [75] 75  Resp:  [16] 16  BP: (110)/(59) 110/59    Physical " Exam    Constitutional: Awake, alert, no acute distress   Eyes: Pupils equal, sclerae anicteric, no conjunctival injection   HENT: NCAT, mucous membranes moist   Neck: Supple, no thyromegaly, no lymphadenopathy, trachea midline   Respiratory: Clear to auscultation bilaterally, nonlabored respirations    Cardiovascular: RRR, no murmurs, rubs, or gallops, palpable pedal pulses bilaterally   Gastrointestinal: Positive bowel sounds, soft, nontender, nondistended   Musculoskeletal: No bilateral ankle edema, no clubbing or cyanosis to extremities   Psychiatric: Appropriate affect, cooperative   Neurologic: Oriented x 3, strength symmetric in all extremities, Cranial Nerves grossly intact to confrontation, speech clear   Skin: No rashes     Result Review    Result Review:  I have personally reviewed the results from the time of this admission to 9/13/2024 16:16 EDT and agree with these findings:  [x]  Laboratory  [x]  Microbiology  [x]  Radiology  [x]  EKG/Telemetry   [x]  Cardiology/Vascular   [x]  Pathology  [x]  Old records  []  Other:      Assessment & Plan   Assessment / Plan   #1 COVID pneumonia   -patient treated with azithromycin and Rocephin empirically.  Complete respiratory panel ordered.  Paxlovid if patient meets criteria.  -Complete respiratory panel ordered for secondary infection.  -Will not start steroids because patient is not requiring oxygen.    #2 asthma continue home inhalers.  Continue Singulair, RT bronchopulmonary hygiene.    #3 syncope in a patient with a known history of syncope  -Cardiology consulted.  Patient missed her last appointment with Dr. Hill.  She has a loop recorder.  Will also check orthostatics.  Gentle hydration.  -Could be secondary to prazosin?  -Could also be secondary to polypharmacy.    #4 ASD post surgical repair in 1980.     #5 depression and anxiety  -Continue home Elavil, Abilify, prazosin, Seroquel, Lexapro, Atarax.     #6 chronic pain   -patient follows with  [facility] pain management.  Continue home pain regimen of muscle relaxants,    #7 hypertension continue HCTZ.    #8 GERD continue PPI    #9 non-insulin-dependent diabetes hold metformin; cover with insulin sliding scale    VTE Prophylaxis:  Pharmacologic VTE prophylaxis orders are signed & held. Mechanical VTE prophylaxis orders are present.        CODE STATUS:    Level Of Support Discussed With: Patient  Code Status (Patient has no pulse and is not breathing): CPR (Attempt to Resuscitate)  Medical Interventions (Patient has pulse or is breathing): Full Support      Admission Status:  I believe this patient meets observation status.    Electronically signed by Brett Sutherland DO, 09/13/24, 4:04 PM EDT.

## 2024-09-14 ENCOUNTER — READMISSION MANAGEMENT (OUTPATIENT)
Dept: CALL CENTER | Facility: HOSPITAL | Age: 49
End: 2024-09-14
Payer: COMMERCIAL

## 2024-09-14 ENCOUNTER — APPOINTMENT (OUTPATIENT)
Dept: CARDIOLOGY | Facility: HOSPITAL | Age: 49
End: 2024-09-14
Payer: COMMERCIAL

## 2024-09-14 VITALS
TEMPERATURE: 97.4 F | DIASTOLIC BLOOD PRESSURE: 80 MMHG | WEIGHT: 163.14 LBS | HEART RATE: 96 BPM | OXYGEN SATURATION: 97 % | SYSTOLIC BLOOD PRESSURE: 158 MMHG | HEIGHT: 67 IN | RESPIRATION RATE: 18 BRPM | BODY MASS INDEX: 25.61 KG/M2

## 2024-09-14 LAB
ANION GAP SERPL CALCULATED.3IONS-SCNC: 9.6 MMOL/L (ref 5–15)
BASOPHILS # BLD AUTO: 0.02 10*3/MM3 (ref 0–0.2)
BASOPHILS NFR BLD AUTO: 0.3 % (ref 0–1.5)
BH CV ECHO MEAS - AO MAX PG: 4.4 MMHG
BH CV ECHO MEAS - AO MEAN PG: 3 MMHG
BH CV ECHO MEAS - AO ROOT DIAM: 3.2 CM
BH CV ECHO MEAS - AO V2 MAX: 105 CM/SEC
BH CV ECHO MEAS - AO V2 VTI: 20.7 CM
BH CV ECHO MEAS - AVA(I,D): 2.41 CM2
BH CV ECHO MEAS - EDV(CUBED): 132.7 ML
BH CV ECHO MEAS - EDV(MOD-SP2): 91.8 ML
BH CV ECHO MEAS - EDV(MOD-SP4): 65.2 ML
BH CV ECHO MEAS - EF(MOD-BP): 57.3 %
BH CV ECHO MEAS - EF(MOD-SP2): 61 %
BH CV ECHO MEAS - EF(MOD-SP4): 55.2 %
BH CV ECHO MEAS - ESV(CUBED): 32.8 ML
BH CV ECHO MEAS - ESV(MOD-SP2): 35.8 ML
BH CV ECHO MEAS - ESV(MOD-SP4): 29.2 ML
BH CV ECHO MEAS - FS: 37.3 %
BH CV ECHO MEAS - IVS/LVPW: 1.11 CM
BH CV ECHO MEAS - IVSD: 1 CM
BH CV ECHO MEAS - LA DIMENSION: 4.3 CM
BH CV ECHO MEAS - LAT PEAK E' VEL: 16.6 CM/SEC
BH CV ECHO MEAS - LV DIASTOLIC VOL/BSA (35-75): 35.2 CM2
BH CV ECHO MEAS - LV MASS(C)D: 175.6 GRAMS
BH CV ECHO MEAS - LV MAX PG: 2.41 MMHG
BH CV ECHO MEAS - LV MEAN PG: 1 MMHG
BH CV ECHO MEAS - LV SYSTOLIC VOL/BSA (12-30): 15.8 CM2
BH CV ECHO MEAS - LV V1 MAX: 77.7 CM/SEC
BH CV ECHO MEAS - LV V1 VTI: 15.9 CM
BH CV ECHO MEAS - LVIDD: 5.1 CM
BH CV ECHO MEAS - LVIDS: 3.2 CM
BH CV ECHO MEAS - LVOT AREA: 3.1 CM2
BH CV ECHO MEAS - LVOT DIAM: 2 CM
BH CV ECHO MEAS - LVPWD: 0.9 CM
BH CV ECHO MEAS - MED PEAK E' VEL: 8.2 CM/SEC
BH CV ECHO MEAS - MV A MAX VEL: 64.8 CM/SEC
BH CV ECHO MEAS - MV DEC SLOPE: 520 CM/SEC2
BH CV ECHO MEAS - MV DEC TIME: 0.15 SEC
BH CV ECHO MEAS - MV E MAX VEL: 76 CM/SEC
BH CV ECHO MEAS - MV E/A: 1.17
BH CV ECHO MEAS - MV MEAN PG: 1 MMHG
BH CV ECHO MEAS - MV V2 VTI: 20 CM
BH CV ECHO MEAS - MVA(VTI): 2.5 CM2
BH CV ECHO MEAS - RVDD: 2.9 CM
BH CV ECHO MEAS - SV(LVOT): 50 ML
BH CV ECHO MEAS - SV(MOD-SP2): 56 ML
BH CV ECHO MEAS - SV(MOD-SP4): 36 ML
BH CV ECHO MEAS - SVI(LVOT): 26.9 ML/M2
BH CV ECHO MEAS - SVI(MOD-SP2): 30.2 ML/M2
BH CV ECHO MEAS - SVI(MOD-SP4): 19.4 ML/M2
BH CV ECHO MEAS - TAPSE (>1.6): 1.56 CM
BH CV ECHO MEAS - TR MAX PG: 42.5 MMHG
BH CV ECHO MEAS - TR MAX VEL: 326 CM/SEC
BH CV ECHO MEASUREMENTS AVERAGE E/E' RATIO: 6.13
BUN SERPL-MCNC: 10 MG/DL (ref 6–20)
BUN/CREAT SERPL: 15.2 (ref 7–25)
CALCIUM SPEC-SCNC: 8.8 MG/DL (ref 8.6–10.5)
CHLORIDE SERPL-SCNC: 107 MMOL/L (ref 98–107)
CO2 SERPL-SCNC: 25.4 MMOL/L (ref 22–29)
CREAT SERPL-MCNC: 0.66 MG/DL (ref 0.57–1)
DEPRECATED RDW RBC AUTO: 54.2 FL (ref 37–54)
EGFRCR SERPLBLD CKD-EPI 2021: 107 ML/MIN/1.73
EOSINOPHIL # BLD AUTO: 0.03 10*3/MM3 (ref 0–0.4)
EOSINOPHIL NFR BLD AUTO: 0.5 % (ref 0.3–6.2)
ERYTHROCYTE [DISTWIDTH] IN BLOOD BY AUTOMATED COUNT: 15.9 % (ref 12.3–15.4)
GLUCOSE BLDC GLUCOMTR-MCNC: 118 MG/DL (ref 70–99)
GLUCOSE BLDC GLUCOMTR-MCNC: 95 MG/DL (ref 70–99)
GLUCOSE SERPL-MCNC: 99 MG/DL (ref 65–99)
HCT VFR BLD AUTO: 35.1 % (ref 34–46.6)
HGB BLD-MCNC: 11 G/DL (ref 12–15.9)
IMM GRANULOCYTES # BLD AUTO: 0.02 10*3/MM3 (ref 0–0.05)
IMM GRANULOCYTES NFR BLD AUTO: 0.3 % (ref 0–0.5)
IVRT: 82 MS
LEFT ATRIUM VOLUME INDEX: 25.8 ML/M2
LYMPHOCYTES # BLD AUTO: 2.51 10*3/MM3 (ref 0.7–3.1)
LYMPHOCYTES NFR BLD AUTO: 39.3 % (ref 19.6–45.3)
MAGNESIUM SERPL-MCNC: 1.9 MG/DL (ref 1.6–2.6)
MCH RBC QN AUTO: 29.2 PG (ref 26.6–33)
MCHC RBC AUTO-ENTMCNC: 31.3 G/DL (ref 31.5–35.7)
MCV RBC AUTO: 93.1 FL (ref 79–97)
MONOCYTES # BLD AUTO: 0.42 10*3/MM3 (ref 0.1–0.9)
MONOCYTES NFR BLD AUTO: 6.6 % (ref 5–12)
NEUTROPHILS NFR BLD AUTO: 3.39 10*3/MM3 (ref 1.7–7)
NEUTROPHILS NFR BLD AUTO: 53 % (ref 42.7–76)
NRBC BLD AUTO-RTO: 0 /100 WBC (ref 0–0.2)
NT-PROBNP SERPL-MCNC: 96.8 PG/ML (ref 0–900)
PHOSPHATE SERPL-MCNC: 3.7 MG/DL (ref 2.5–4.5)
PLATELET # BLD AUTO: 277 10*3/MM3 (ref 140–450)
PMV BLD AUTO: 11.1 FL (ref 6–12)
POTASSIUM SERPL-SCNC: 3.6 MMOL/L (ref 3.5–5.2)
PROCALCITONIN SERPL-MCNC: <0.02 NG/ML (ref 0–0.25)
RBC # BLD AUTO: 3.77 10*6/MM3 (ref 3.77–5.28)
SODIUM SERPL-SCNC: 142 MMOL/L (ref 136–145)
WBC NRBC COR # BLD AUTO: 6.39 10*3/MM3 (ref 3.4–10.8)

## 2024-09-14 PROCEDURE — 93306 TTE W/DOPPLER COMPLETE: CPT

## 2024-09-14 PROCEDURE — 96372 THER/PROPH/DIAG INJ SC/IM: CPT

## 2024-09-14 PROCEDURE — 99239 HOSP IP/OBS DSCHRG MGMT >30: CPT | Performed by: INTERNAL MEDICINE

## 2024-09-14 PROCEDURE — 82948 REAGENT STRIP/BLOOD GLUCOSE: CPT

## 2024-09-14 PROCEDURE — G0378 HOSPITAL OBSERVATION PER HR: HCPCS

## 2024-09-14 PROCEDURE — 80048 BASIC METABOLIC PNL TOTAL CA: CPT | Performed by: HOSPITALIST

## 2024-09-14 PROCEDURE — 83735 ASSAY OF MAGNESIUM: CPT | Performed by: HOSPITALIST

## 2024-09-14 PROCEDURE — 84145 PROCALCITONIN (PCT): CPT | Performed by: INTERNAL MEDICINE

## 2024-09-14 PROCEDURE — 84100 ASSAY OF PHOSPHORUS: CPT | Performed by: HOSPITALIST

## 2024-09-14 PROCEDURE — 25810000003 SODIUM CHLORIDE 0.9 % SOLUTION: Performed by: HOSPITALIST

## 2024-09-14 PROCEDURE — 85025 COMPLETE CBC W/AUTO DIFF WBC: CPT | Performed by: HOSPITALIST

## 2024-09-14 PROCEDURE — 25010000002 HEPARIN (PORCINE) PER 1000 UNITS: Performed by: HOSPITALIST

## 2024-09-14 PROCEDURE — 83880 ASSAY OF NATRIURETIC PEPTIDE: CPT | Performed by: INTERNAL MEDICINE

## 2024-09-14 PROCEDURE — 93306 TTE W/DOPPLER COMPLETE: CPT | Performed by: STUDENT IN AN ORGANIZED HEALTH CARE EDUCATION/TRAINING PROGRAM

## 2024-09-14 PROCEDURE — 99222 1ST HOSP IP/OBS MODERATE 55: CPT | Performed by: STUDENT IN AN ORGANIZED HEALTH CARE EDUCATION/TRAINING PROGRAM

## 2024-09-14 RX ORDER — ONDANSETRON 2 MG/ML
4 INJECTION INTRAMUSCULAR; INTRAVENOUS EVERY 4 HOURS PRN
Status: DISCONTINUED | OUTPATIENT
Start: 2024-09-14 | End: 2024-09-14 | Stop reason: HOSPADM

## 2024-09-14 RX ORDER — PROCHLORPERAZINE EDISYLATE 5 MG/ML
5 INJECTION INTRAMUSCULAR; INTRAVENOUS EVERY 6 HOURS PRN
Status: DISCONTINUED | OUTPATIENT
Start: 2024-09-14 | End: 2024-09-14 | Stop reason: HOSPADM

## 2024-09-14 RX ORDER — ALBUTEROL SULFATE 0.83 MG/ML
2.5 SOLUTION RESPIRATORY (INHALATION) EVERY 6 HOURS PRN
Status: DISCONTINUED | OUTPATIENT
Start: 2024-09-14 | End: 2024-09-14 | Stop reason: HOSPADM

## 2024-09-14 RX ORDER — ACETAMINOPHEN 325 MG/1
650 TABLET ORAL EVERY 6 HOURS PRN
Status: DISCONTINUED | OUTPATIENT
Start: 2024-09-14 | End: 2024-09-14 | Stop reason: HOSPADM

## 2024-09-14 RX ORDER — METHOCARBAMOL 500 MG/1
750 TABLET, FILM COATED ORAL 3 TIMES DAILY
Status: DISCONTINUED | OUTPATIENT
Start: 2024-09-14 | End: 2024-09-14 | Stop reason: HOSPADM

## 2024-09-14 RX ORDER — LIDOCAINE 4 G/G
1 PATCH TOPICAL DAILY PRN
Status: DISCONTINUED | OUTPATIENT
Start: 2024-09-14 | End: 2024-09-14 | Stop reason: HOSPADM

## 2024-09-14 RX ORDER — OXYCODONE AND ACETAMINOPHEN 5; 325 MG/1; MG/1
1 TABLET ORAL 2 TIMES DAILY PRN
Status: DISCONTINUED | OUTPATIENT
Start: 2024-09-14 | End: 2024-09-14 | Stop reason: HOSPADM

## 2024-09-14 RX ADMIN — Medication 1 TABLET: at 08:21

## 2024-09-14 RX ADMIN — OXYCODONE HYDROCHLORIDE AND ACETAMINOPHEN 1 TABLET: 5; 325 TABLET ORAL at 08:21

## 2024-09-14 RX ADMIN — SODIUM CHLORIDE 75 ML/HR: 9 INJECTION, SOLUTION INTRAVENOUS at 08:24

## 2024-09-14 RX ADMIN — HEPARIN SODIUM 5000 UNITS: 5000 INJECTION INTRAVENOUS; SUBCUTANEOUS at 06:10

## 2024-09-14 RX ADMIN — GABAPENTIN 600 MG: 300 CAPSULE ORAL at 08:21

## 2024-09-14 RX ADMIN — Medication 10 ML: at 08:22

## 2024-09-14 RX ADMIN — Medication 400 MG: at 09:48

## 2024-09-14 RX ADMIN — NIRMATRELVIR AND RITONAVIR 3 TABLET: KIT at 08:21

## 2024-09-14 NOTE — DISCHARGE INSTRUCTIONS
Please decrease your prazosin AKA minipress dose to 1mg at night alone (instead of 3mg nightly).

## 2024-09-14 NOTE — PLAN OF CARE
Goal Outcome Evaluation:  Plan of Care Reviewed With: patient        Progress: improving  Outcome Evaluation: AxOx4. Denied any weakness or dizziness throughout the night. VSS. Rested well.

## 2024-09-14 NOTE — DISCHARGE SUMMARY
[facility]         HOSPITALIST  DISCHARGE SUMMARY    Patient Name: Casi Lopez    : 1975    MRN: [id number]    Date of Admission: 2024  Date of Discharge:  24  Primary Care Physician: Adore Quispe APRN    Consults       Date and Time Order Name Status Description    2024  4:05 PM Inpatient Cardiology Consult      2024  1:38 PM Inpatient Hospitalist Consult              Final Diagnosis:  Syncope and collapse x3, possible vasovagal  Polypharmacy and suspect intermittent hx of orthostatic hypotension/anticholinergic effects of home medications  COVID-19 positive, possibly subacute  Bipolar disorder  History of asthma  ASD with history of repair in   Chronic pain follows with pain management.  DM2  Hypertension  Medication adherence    Hospital Course     Hospital Course:  49-year-old female with syncope (and prior loop recorder) history of asthma, bipolar, DM2, hypertension who presented after having 3 syncopal episodes while at work.,  No postictal state or seizure activity noted, first 2 episodes were quickly resolved and within a few minutes of each other and third episode was for a few minutes before regaining consciousness without any postictal state noted.  Has a history of syncope but has had been preceded by orthostatic type symptoms and with positional change.  History of prior cardiac workup with    10/2023- EF 46 to 50%. g1dd  2021 cardiac cath normal 20 arteries and systolic function  EKGD - qtc 430, twi lat leads, possible ivcd, sinus      pt is on robaxin and percocet 5 bid prn, may have component from home meds and prazosin.  She is not actually taking several of the listed bp medications listed below.  Reducing patient's evening prazosin dose for now.  Will have patient follow-up outpatient to monitor blood pressure, echo and with cardiology for tilt table testing/ dr antoine.  Loop recorder was reportedly negative for acute events on review  per cardiology.     Sat'ing % on room air without acute resp symptoms, negative procal, normal wbc, and seems like probably had covid 2 weeks ago and given interaction with her psychiatric medications deferring paxlovid.        Patient discharged in stable condition with close outpatient follow up. Return precautions and monitoring including not driving (she doesn't at baseline anyways) and follow up discussed and patient  voiced agreement and understanding of treatment plan.     DISCHARGE Follow Up Recommendations for labs and diagnostics:   As above    CODE STATUS:  Code Status and Medical Interventions: CPR (Attempt to Resuscitate); Full Support   Ordered at: 09/13/24 1616     Level Of Support Discussed With:    Patient     Code Status (Patient has no pulse and is not breathing):    CPR (Attempt to Resuscitate)     Medical Interventions (Patient has pulse or is breathing):    Full Support           Day of Discharge     Vital Signs:  Temp:  [97.6 °F (36.4 °C)-98.2 °F (36.8 °C)] 97.6 °F (36.4 °C)  Heart Rate:  [] 96  Resp:  [18] 18  BP: (112-154)/(72-95) 154/82    Physical Exam    Gen: awake, resting in bed, conversant  Resp: breathing comfortably on room air no wheezing   CV: RRR, no LE pitting edema      Discharge Details        Discharge Medications        New Medications        Instructions Start Date   magnesium oxide 400 tablet tablet  Commonly known as: MAG-OX   400 mg, Oral, Daily   Start Date: September 15, 2024            Changes to Medications        Instructions Start Date   prazosin 1 MG capsule  Commonly known as: MINIPRESS  What changed: Another medication with the same name was removed. Continue taking this medication, and follow the directions you see here.   Take 1 capsule by mouth Every Night.             Continue These Medications        Instructions Start Date   albuterol sulfate  (90 Base) MCG/ACT inhaler  Commonly known as: PROVENTIL HFA;VENTOLIN HFA;PROAIR HFA   Inhale 2  puffs Every 4 (Four) Hours As Needed.      amitriptyline 50 MG tablet  Commonly known as: ELAVIL   Take 1 tablet by mouth Every Night.      ARIPiprazole 10 MG tablet  Commonly known as: ABILIFY   Take 1 tablet by mouth Every Night.      atorvastatin 20 MG tablet  Commonly known as: LIPITOR   20 mg, Oral, Every Night at Bedtime      escitalopram 10 MG tablet  Commonly known as: LEXAPRO   Take 1.5 tablets by mouth Daily.      gabapentin 600 MG tablet  Commonly known as: NEURONTIN   600 mg, Oral, 3 Times Daily      hydrOXYzine 50 MG tablet  Commonly known as: ATARAX   Take 0.5-1 tablets by mouth Every 6 (Six) Hours As Needed.      loratadine 10 MG tablet  Commonly known as: CLARITIN   1 tablet, Oral, Daily      melatonin 5 MG tablet tablet   5 mg, Oral, Nightly      metFORMIN 500 MG tablet  Commonly known as: GLUCOPHAGE   Take 1 tablet by mouth 2 (Two) Times a Day.      methocarbamol 750 MG tablet  Commonly known as: ROBAXIN   1 tablet, Oral, 3 Times Daily      montelukast 10 MG tablet  Commonly known as: SINGULAIR   Take 1 tablet by mouth Every Night.      naloxone 4 MG/0.1ML nasal spray  Commonly known as: NARCAN   Call 911. Don't prime. [place] in 1 nostril for overdose. Repeat in 2-3 minutes in other nostril if no or minimal breathing/responsiveness.      omeprazole 20 MG capsule  Commonly known as: priLOSEC   1 capsule, Oral, Daily      oxyCODONE-acetaminophen 5-325 MG per tablet  Commonly known as: PERCOCET   1 tablet, Oral, 3 Times Daily      QUEtiapine 100 MG tablet  Commonly known as: SEROquel   100 mg, Oral, Nightly      Symbicort 80-4.5 MCG/ACT inhaler  Generic drug: budesonide-formoterol   2 puffs, Inhalation, 2 Times Daily      Vitamin D3 1.25 MG (28364 UT) capsule   1 capsule, Oral, Weekly, TAKES ON FRIDAY      Vitamin E 180 MG (400 UNIT) capsule capsule   1 capsule, Oral, Daily             Stop These Medications      carvedilol 12.5 MG tablet  Commonly known as: COREG     hydroCHLOROthiazide 25 MG tablet      Jardiance 10 MG tablet tablet  Generic drug: empagliflozin                Discharge Disposition:  Home or Self Care    Diet: continue on diet / dietary restrictions from hospitalization     Discharge Activity: advance as tolerated      Additional Instructions for the Follow-ups that You Need to Schedule       Discharge Follow-up with PCP   As directed       Currently Documented PCP:    Adore Quispe APRN    PCP Phone Number:    [phone]     Follow Up Details: 3-5 days hospital f/u        Discharge Follow-up with Specified Provider: f/u with cardiology for tilt table testing/ dr antoine   As directed      To: f/u with cardiology for tilt table testing/ dr antoine                Pertinent  and/or Most Recent Results       LAB RESULTS:      Lab 09/14/24  0354 09/13/24  1140 09/13/24  1049   WBC 6.39  --  5.19   HEMOGLOBIN 11.0*  --  10.4*   HEMATOCRIT 35.1  --  32.6*   PLATELETS 277  --  268   NEUTROS ABS 3.39  --  3.11   IMMATURE GRANS (ABS) 0.02  --  0.01   LYMPHS ABS 2.51  --  1.59   MONOS ABS 0.42  --  0.42   EOS ABS 0.03  --  0.04   MCV 93.1  --  91.8   PROCALCITONIN <0.02  --  0.03   LACTATE  --  1.7  --          Lab 09/14/24  0354 09/13/24  1049   SODIUM 142 139   POTASSIUM 3.6 3.2*   CHLORIDE 107 103   CO2 25.4 27.1   ANION GAP 9.6 8.9   BUN 10 12   CREATININE 0.66 0.81   EGFR 107.0 88.6   GLUCOSE 99 131*   CALCIUM 8.8 9.1   MAGNESIUM 1.9 1.7   PHOSPHORUS 3.7 3.8         Lab 09/13/24  1049   TOTAL PROTEIN 6.6   ALBUMIN 3.6   GLOBULIN 3.0   ALT (SGPT) 12   AST (SGOT) 16   BILIRUBIN 0.3   ALK PHOS 94         Lab 09/14/24  0354 09/13/24  1049   PROBNP 96.8  --    HSTROP T  --  7             Lab 09/13/24  1049   IRON 53   IRON SATURATION (TSAT) 19*   TIBC 283*   TRANSFERRIN 190*   FERRITIN 124.40   FOLATE 9.41   VITAMIN B 12 397         Brief Urine Lab Results  (Last result in the past 365 days)        Color   Clarity   Blood   Leuk Est   Nitrite   Protein   CREAT   Urine HCG        09/13/24 1140 Dark  Yellow   Cloudy   Negative   Trace   Negative   30 mg/dL (1+)                 Microbiology Results (last 10 days)       Procedure Component Value - Date/Time    Legionella Antigen, Urine - Urine, Urine, Clean Catch [[id number]]  (Normal) Collected: 09/13/24 1728    Lab Status: Final result Specimen: Urine, Clean Catch Updated: 09/13/24 1752     LEGIONELLA ANTIGEN, URINE Negative    S. Pneumo Ag Urine or CSF - Urine, Urine, Clean Catch [[id number]]  (Normal) Collected: 09/13/24 1728    Lab Status: Final result Specimen: Urine, Clean Catch Updated: 09/13/24 1751     Strep Pneumo Ag Negative    COVID PRE-OP / PRE-PROCEDURE SCREENING ORDER (NO ISOLATION) - Swab, Nasopharynx [[id number]]  (Abnormal) Collected: 09/13/24 1426    Lab Status: Final result Specimen: Swab from Nasopharynx Updated: 09/13/24 1537    Narrative:      The following orders were created for panel order COVID PRE-OP / PRE-PROCEDURE SCREENING ORDER (NO ISOLATION) - Swab, Nasopharynx.  Procedure                               Abnormality         Status                     ---------                               -----------         ------                     COVID-19,CEPHEID/DARLIN,CO...[[id number]]  Abnormal            Final result                 Please view results for these tests on the individual orders.    COVID-19,CEPHEID/DARLIN,COR/MANASA/PAD/KAVIN/LAG/PENNIE IN-HOUSE,NP SWAB IN TRANSPORT MEDIA 1 HR TAT, RT-PCR - Swab, Nasopharynx [[id number]]  (Abnormal) Collected: 09/13/24 1426    Lab Status: Final result Specimen: Swab from Nasopharynx Updated: 09/13/24 1537     COVID19 Detected    Narrative:      Fact sheet for providers: https://www.fda.gov/media/114055/download     Fact sheet for patients: https://www.fda.gov/media/618269/download  Fact sheet for providers: https://www.fda.gov/media/496132/download     Fact sheet for patients: https://www.fda.gov/media/881060/download            RADIOLOGY:    XR Chest 1 View    Result Date: 9/13/2024  Impression: Impression:  1. Dependent opacities at the lung bases favored represent atelectasis. Pneumonia not excluded. Electronically Signed: Bony Espinoza MD  9/13/2024 1:20 PM EDT  Workstation ID: [id number]             Results for orders placed in visit on 10/20/23    Adult Transthoracic Echo Complete W/ Cont if Necessary Per Protocol    Interpretation Summary    Left ventricular systolic function is mildly decreased. Left ventricular ejection fraction appears to be 46 - 50%.    The left ventricular cavity is borderline dilated.    Left ventricular diastolic function is consistent with (grade I) impaired relaxation.    The left atrial cavity is mildly dilated.    Estimated right ventricular systolic pressure from tricuspid regurgitation is normal (<35 mmHg).      Labs Pending at Discharge:  Pending Labs       Order Current Status    Blood Culture - Blood, Arm, Left In process    Blood Culture - Blood, Arm, Right In process              Time spent on Discharge including face to face service:  >35 minutes

## 2024-09-14 NOTE — PLAN OF CARE
Goal Outcome Evaluation:  Plan of Care Reviewed With: patient        Progress: improving  Outcome Evaluation: Patient is alert and oriented. No complaints of pain this shift. Patient has been ambulating in room without difficulty. Planning for discharge home.

## 2024-09-14 NOTE — OUTREACH NOTE
Prep Survey      Flowsheet Row Responses   [facility] facility patient discharged from? [facility]   Is LACE score < 7 ? Yes   Eligibility Not Eligible   What are the reasons patient is not eligible? Other  [Readmission Risk Score Low]   Does the patient have one of the following disease processes/diagnoses(primary or secondary)? Other   Prep survey completed? Yes            [facility] [employer] - Registered Nurse

## 2024-09-14 NOTE — CONSULTS
Cardiology Consult Note  [facility] 4TH FLOOR MEDICAL TELEMETRY UNIT          Patient Identification:  Casi Lopez      [id number]  49 y.o.        female  1975     Reason for Consultation:   Syncope    PCP: Adore Quispe APRN    History of Present Illness:  Ms. Lopez is a 49-year-old female presenting to the ER with a complaint of passing out.    She is a patient of Dr. Hill.  She underwent loop recorder insertion for evaluation of syncope in December 2023.  Patient has had syncope multiple times in the past.  Thus far no etiology has been established.  In past 3 months she has not had an episode until yesterday.  She states that she works at [employer] in.  She came in and was plugging her  and then she passed out described by her friend.  She does not recall having a warning sign this time.  Traditionally she feels lightheaded before these episodes.  She added that she has been stressed a lot lately due to her bills.  She does not recall having clouding of vision, lightheadedness, feeling of warmth or nausea.  She does not think any situations particularly predispose her to syncope.  She has no family history of sudden cardiac death in her family either.    She has history of diabetes and hypertension.  She is well compliant with her medications and make sure that she is adequately hydrated all the time.  She denies any recent medication changes.    Past History:  Past Medical History:   Diagnosis Date    Acid reflux     Anxiety     Arthritis     Asthma     HAS INHALERS SCHEDULED AND PRN    Depression     Diabetes mellitus     AVERAGE AM 'S    Fibroid     H/O precordial chest pain     FOLLOWED BY DR HILL. DENIES CP/SOA. REPORTS WORKS FULL TIME IN [employer].    Hyperlipidemia     Hypertension     Implantable loop recorder present     Injury of back     Migraines     Pain management     [facility]    Sleep apnea     REPORTS IS SUPPOSED TO BE RECEIVING CPAP    Syncope   "    Past Surgical History:   Procedure Laterality Date    CARDIAC CATHETERIZATION N/A 08/12/2021    Procedure: Left Heart Cath - R radial;  Surgeon: NNAMDI Hill MD;  Location: [facility] CATH INVASIVE LOCATION;  Service: Cardiology;  Laterality: N/A;    CARDIAC ELECTROPHYSIOLOGY PROCEDURE N/A 1/11/2024    Procedure: Loop insertion;  Surgeon: NNAMDI Hill MD;  Location: [facility] CATH INVASIVE LOCATION;  Service: Cardiovascular;  Laterality: N/A;    CARDIAC SURGERY      \"repair of hole in heart\" HAD REPAIR OF ATRIAL-SEPTAL DEFECT IN 1980    HYSTERECTOMY      LAPAROSCOPIC TUBAL LIGATION      TOTAL HIP ARTHROPLASTY Right 5/11/2023    Procedure: RIGHT TOTAL HIP ARTHROPLASTY ANTERIOR;  Surgeon: Elias Gruber MD;  Location: [facility] MAIN OR;  Service: Orthopedics;  Laterality: Right;     No Known Allergies  Social History     Socioeconomic History    Marital status:    Tobacco Use    Smoking status: Never     Passive exposure: Never    Smokeless tobacco: Never   Vaping Use    Vaping status: Never Used   Substance and Sexual Activity    Alcohol use: Never    Drug use: Never    Sexual activity: Not Currently     Birth control/protection: Hysterectomy     Family History   Problem Relation Age of Onset    Hypertension Father     Diabetes Father     Cancer Father     Diabetes Mother     Hypertension Mother     Ovarian cancer Neg Hx     Uterine cancer Neg Hx     Breast cancer Neg Hx     Prostate cancer Neg Hx     Colon cancer Neg Hx     Clotting disorder Neg Hx     Deep vein thrombosis Neg Hx     Pulmonary embolism Neg Hx      Medications:  Prior to Admission medications    Medication Sig Start Date End Date Taking? Authorizing Provider   gabapentin (NEURONTIN) 600 MG tablet Take 1 tablet by mouth 3 (Three) Times a Day.   Yes Rama Sierra MD   oxyCODONE-acetaminophen (PERCOCET) 5-325 MG per tablet Take 1 tablet by mouth 3 (Three) Times a Day. 6/2/23  Yes Rama Sierra MD   albuterol sulfate  (90 " Base) MCG/ACT inhaler Inhale 2 puffs Every 4 (Four) Hours As Needed.    Rama Sierra MD   amitriptyline (ELAVIL) 50 MG tablet Take 1 tablet by mouth Every Night.    Rama Sierra MD   ARIPiprazole (ABILIFY) 10 MG tablet Take 1 tablet by mouth Every Night.    Rama Sierra MD   atorvastatin (LIPITOR) 20 MG tablet Take 1 tablet by mouth every night at bedtime. 1/18/23   Rama Sierra MD   carvedilol (COREG) 12.5 MG tablet Take 1 tablet by mouth 2 (Two) Times a Day. 12/7/23   Marce Graham APRN   Cholecalciferol (Vitamin D3) 1.25 MG (76013 UT) capsule Take 1 capsule by mouth 1 (One) Time Per Week. TAKES ON FRIDAY 3/15/23   Rama Sierra MD   escitalopram (LEXAPRO) 10 MG tablet Take 1.5 tablets by mouth Daily.    Rama Sierra MD   hydroCHLOROthiazide (HYDRODIURIL) 25 MG tablet Take 1 tablet by mouth Daily. 12/21/23   Marce Graham APRN   hydrOXYzine (ATARAX) 50 MG tablet Take 0.5-1 tablets by mouth Every 6 (Six) Hours As Needed. 8/29/23   Rama Sierra MD   Jardiance 10 MG tablet tablet Take 1 tablet by mouth Daily. INSTRUCTED PER ANESTHESIA PROTOCOL 1/18/23   Rama Sierra MD   loratadine (CLARITIN) 10 MG tablet Take 1 tablet by mouth Daily. 8/29/23   Rama Sierra MD   melatonin 5 MG tablet tablet Take 1 tablet by mouth Every Night.    Rama Sierra MD   metFORMIN (GLUCOPHAGE) 500 MG tablet Take 1 tablet by mouth 2 (Two) Times a Day.    Rama Sierra MD   methocarbamol (ROBAXIN) 750 MG tablet Take 1 tablet by mouth 3 (Three) Times a Day. 3/22/23   Rama Sierra MD   montelukast (SINGULAIR) 10 MG tablet Take 1 tablet by mouth Every Night.    Rama Sierra MD   naloxone (NARCAN) 4 MG/0.1ML nasal spray Call 911. Don't prime. [place] in 1 nostril for overdose. Repeat in 2-3 minutes in other nostril if no or minimal breathing/responsiveness. 5/11/23   Elias Gruber MD   omeprazole (priLOSEC) 20 MG  capsule Take 1 capsule by mouth Daily. 10/12/23   ProviderRama MD   prazosin (MINIPRESS) 1 MG capsule Take 1 capsule by mouth Every Night.    ProviderRama MD   prazosin (MINIPRESS) 2 MG capsule Take 1 capsule by mouth Every Night. 6/22/21   Emergency, Nurse Epic, RN   QUEtiapine (SEROquel) 100 MG tablet Take 1 tablet by mouth Every Night. 5/9/23   Rama Sierra MD   Symbicort 80-4.5 MCG/ACT inhaler Inhale 2 puffs 2 (Two) Times a Day. 6/22/21   Rama Sierra MD   Vitamin E 180 MG (400 UNIT) capsule capsule Take 1 capsule by mouth Daily. 1/18/23   Rama Sierra MD      Current medications:  gabapentin, 600 mg, Oral, Q12H  heparin (porcine), 5,000 Units, Subcutaneous, Q8H  insulin lispro, 2-9 Units, Subcutaneous, 4x Daily AC & at Bedtime  magnesium oxide, 400 mg, Oral, Daily  [Held by provider] methocarbamol, 750 mg, Oral, TID  multivitamin with minerals, 1 tablet, Oral, Daily  Nirmatrelvir & Ritonavir (300mg/100mg), 3 tablet, Oral, BID  senna-docusate sodium, 2 tablet, Oral, BID  sodium chloride, 10 mL, Intravenous, Q12H  sodium chloride, 10 mL, Intravenous, Q12H      Current IV drips:  sodium chloride, 75 mL/hr, Last Rate: 75 mL/hr (09/14/24 0824)              Physical Exam  Constitutional:       Appearance: Normal appearance.   HENT:      Head: Normocephalic and atraumatic.      Nose: Nose normal.      Mouth/Throat:      Mouth: Mucous membranes are moist.   Eyes:      Extraocular Movements: Extraocular movements intact.   Cardiovascular:      Rate and Rhythm: Normal rate and regular rhythm.      Heart sounds: Normal heart sounds. No murmur heard.     No friction rub.   Musculoskeletal:      Right lower leg: No edema.      Left lower leg: No edema.   Skin:     General: Skin is warm.      Capillary Refill: Capillary refill takes 2 to 3 seconds.   Neurological:      General: No focal deficit present.      Mental Status: She is alert and oriented to person, place, and time.  "        /82 (BP Location: Right arm, Patient Position: Lying)   Pulse 96   Temp 97.6 °F (36.4 °C) (Oral)   Resp 18   Ht 170.2 cm (67\")   Wt 74 kg (163 lb 2.3 oz)   SpO2 99%   BMI 25.55 kg/m²  Body mass index is 25.55 kg/m².   Oxygen saturation   @FLOWAN(10::1)@ SpO2  Min: 92 %  Max: 100 %       ECG  (personally reviewed) normal sinus rhythm, left atrial enlargement, right bundle branch block   Telemetry:  (personally reviewed) normal sinus rhythm   Results for orders placed in visit on 10/20/23    Adult Transthoracic Echo Complete W/ Cont if Necessary Per Protocol    Interpretation Summary    Left ventricular systolic function is mildly decreased. Left ventricular ejection fraction appears to be 46 - 50%.    The left ventricular cavity is borderline dilated.    Left ventricular diastolic function is consistent with (grade I) impaired relaxation.    The left atrial cavity is mildly dilated.    Estimated right ventricular systolic pressure from tricuspid regurgitation is normal (<35 mmHg).         Results for orders placed during the hospital encounter of 08/12/21    Cardiac Catheterization/Vascular Study    Narrative  Cardiac catheterization procedure note    Procedure:  1.  Left heart catheterization, selective coronary angiography, left ventricular angiography  2.  Right radial artery access    Indication:    Anginal chest pain despite normal noninvasive stress imaging, progressive/persistent    EBL:    Less than 5 cc    Description:    Risks and benefits were explained to the patient, informed consent was obtained, the patient was brought to the catheterization lab in a fasting state, prepped and draped in sterile fashion for right radial artery access.  Local anesthesia was administered, conscious sedation was administered.  5F sheath was placed in the R radial artery after Allens test passed.  Antispasmodic cocktail and systemic heparin were administered.    A TIG catheter was advanced over a baby J " wire under fluoroscopy.  Left and right coronaries were engaged and angiograms obtained.  An angled pigtail catheter was exchanged for the TIG over the wire and advanced across the aortic valve left ventricular pressure was measured, left ventricular angiography was performed.  Pullback gradient was measured.  The catheters were removed.    Sheath was removed and a TR band was placed for hemostasis.    There were no complications from the diagnostic portion of the catheterization procedure.        Angiographic findings    Left main-large, angiographically normal, bifurcates to the LAD and circumflex  LAD-large vessel, angiographically normal  Left circumflex-large vessel, dominant, normal  RCA-small, nondominant, normal    Left ventricular angiogram-ejection fraction 60%, normal wall motion in ROBERTS view, no significant mitral regurgitation  LV pressure EDP 5-10, no aortic valve gradient    Conclusions:    Angiographically normal coronary arteries  Normal left ventricular systolic function  No evidence for source of cardiac chest pain    Plan:    Primary risk factor modification/prevention strategies  Post radial catheterization care, discharge later today    Gilbert Hill MD      Cardiolite (Tc-99m Sestamibi) stress test   Lab Review:       CBC          9/13/2024    10:49 9/14/2024    03:54   CBC   WBC 5.19  6.39    RBC 3.55  3.77    Hemoglobin 10.4  11.0    Hematocrit 32.6  35.1    MCV 91.8  93.1    MCH 29.3  29.2    MCHC 31.9  31.3    RDW 15.7  15.9    Platelets 268  277        CMP          1/9/2024    09:24 9/13/2024    10:49 9/14/2024    03:54   CMP   Glucose 115  131  99    BUN 9  12  10    Creatinine 0.80  0.81  0.66    EGFR 90.5  88.6  107.0    Sodium 142  139  142    Potassium 4.3  3.2  3.6    Chloride 102  103  107    Calcium 9.8  9.1  8.8    Total Protein  6.6     Albumin  3.6     Globulin  3.0     Total Bilirubin  0.3     Alkaline Phosphatase  94     AST (SGOT)  16     ALT (SGPT)  12    "  Albumin/Globulin Ratio  1.2     BUN/Creatinine Ratio 11.3  14.8  15.2    Anion Gap 8.8  8.9  9.6         CARDIAC LABS:      Lab 09/14/24  0354 09/13/24  1049   PROBNP 96.8  --    HSTROP T  --  7      No results found for: \"DIGOXIN\"   No results found for: \"TSH\"        Invalid input(s): \"LDLCALC\"  Lab Results   Component Value Date    POCTROP 0.01 12/16/2022     No results found for: \"DDIMERQUAN\"  Lab Results   Component Value Date    MG 1.9 09/14/2024             CARDIAC LABS:      Lab 09/14/24  0354 09/13/24  1049   PROBNP 96.8  --    HSTROP T  --  7          Assessment:  Syncope from unknown etiology  Hypertension    Plan:  Patient's EKG was reviewed.  Patient has a prior coronary angiogram which was unremarkable for any significant coronary artery disease.  Patient has a loop recorder in situ.  Will perform loop recorder interrogation to look for underlying heart rhythms associated with syncope.  Troponins have been unremarkable thus far.  No ST changes are noted on EKG concerning for ischemia.  Orthostatics are unremarkable for orthostatic hypotension.  There was no significant hypokalemia or hyponatremia noted as patient is taking hydrochlorothiazide.  I have ordered echocardiogram for further evaluation.  Prior echocardiogram from 3 years does not have any significant valve associated disease.  No significant murmurs were auscultated concerning for aortic valve stenosis.    Due to longstanding history of syncope.  It is likely that it is benign in nature and is likely vasovagal in nature.  We can evaluate further by tilt table testing.  This can be done outpatient.  Continue home regimen for hypertension.    Addendum  Patient's device was interrogated.  Underlying rhythm at the time of syncope was normal sinus rhythm with heart rate in 80s.  No pauses, blocks or ventricular tachycardia were reported.  The report will be faxed and will be attached to patient chart.  Patient can be discharged from cardiology " standpoint she needs a tilt table testing which can be performed outpatient.  She has no known history of aortic stenosis.    Thank you for allowing us to share in [facility]. Please call with any questions or concerns.             Vern Pena MD   9/14/2024    09:42 EDT

## 2024-09-14 NOTE — PAYOR COMM NOTE
"PATIENT INFORMATION  Name:  Casi Lopez  MRN#:     [id number]  :  1975         ADMISSION INFORMATION  CLASS: Observation   DOS:  24        CURRENT ATTENDING PROVIDER INFORMATION  Name/NPI: Ez Low MD [[id number]]  Phone:  Phone: [phone]  Fax:  [phone]        REQUESTING PROVIDER and RENDERING FACILITY  Name:  [facility]   NPI:  [id number]  TID:  [id number]  Address:      [address] [place], [place] [address]  Phone:               [phone]  Fax:  [phone]        UTILIZATION REVIEW CONTACT INFORMATION  Phone:      [phone]  Fax:           [phone]        ADMISSION DIAGNOSIS  Syncope and collapse [R55]  Syncope [R55]        ++++++++++++++++++++++++++++++++++++++++++++++++++++++++++++++++++++++++++++++++            Casi Lopez (49 y.o. Female)       Date of Birth   1975    Social Security Number       Address   [address] [address]    Home Phone   [phone]    MRN   [id number]       Jehovah's witness   None    Marital Status                               Admission Date   24    Admission Type   Emergency    Admitting Provider   Brett Sutherland DO    Attending Provider   Ez Low MD    Department, Room/Bed   [facility] 4TH FLOOR MEDICAL TELEMETRY UNIT, 402/1       Discharge Date       Discharge Disposition       Discharge Destination                                 Attending Provider: Ez Low MD    Allergies: No Known Allergies    Isolation: Contact Air   Infection: COVID (confirmed) (24)   Code Status: CPR    Ht: 170.2 cm (67\")   Wt: 74 kg (163 lb 2.3 oz)    Admission Cmt: None   Principal Problem: Syncope [R55]                   Active Insurance as of 2024       Primary Coverage       Payor Plan Insurance Group Employer/Plan Group    [employer] [place] [employer] [place]       Payor Plan Address Payor Plan Phone Number Payor Plan Fax Number " Effective Dates    [address] [phone]  2024 - None Entered    [facility] [id number]         Subscriber Name Subscriber Birth Date Member ID       CASI LOPEZ 1975 [id number]                     Emergency Contacts        (Rel.) Home Phone Work Phone Mobile Phone    PALMIRA LOPEZ (Daughter) [phone] -- --    CARO SANDERSON [phone] -- [phone]                 History & Physical        Brett Sutherland DO at 24 1604           [facility]  Date: 2024   Patient Name: Casi Lopez  : 1975  MRN: [id number]  Primary Care Physician:  Adore Quispe APRN  Date of admission: 2024    Subjectivedizziness, syncope x 3  Subjective     Chief Complaint: Dizziness, syncope x 3    HPI: Patient is a 49-year-old female who presents to the ED for evaluation of syncope 3 times at work today at the [employer].  Patient was standing when she had her first syncopal event, her other 2 syncopal events happened when she was sitting.  She was not confused after passing out.  She did not lose bowel or urinary continence.  She did say however her feet were shaking.    She also reports cough.  She denies any fever nausea or vomiting.    On arrival to the ED, patient's temperature 98.2, pulse 75, respiratory to 16, blood pressure 110/59, she saturating 100% on room air.    Patient's high-sensitivity troponin is 7, potassium is 3.2, glucose is 131, lactate is 1.7, procalcitonin is 0.03, hemoglobin is 10.4, platelets are 268.    Patient tested positive for COVID.    Personal History     Past Medical History:  Past Medical History:   Diagnosis Date    Acid reflux     Anxiety     Arthritis     Asthma     HAS INHALERS SCHEDULED AND PRN    Depression     Diabetes mellitus     AVERAGE AM 'S    Fibroid     H/O precordial chest pain     FOLLOWED BY DR KAPADIA. DENIES CP/SOA. REPORTS WOR[place] FULL TIME [place].    Hyperlipidemia      "Hypertension     Implantable loop recorder present     Injury of back     Migraines     Pain management     [facility]    Sleep apnea     REPORTS IS SUPPOSED TO BE RECEIVING CPAP    Syncope        Past Surgical History:  Past Surgical History:   Procedure Laterality Date    CARDIAC CATHETERIZATION N/A 08/12/2021    Procedure: Left Heart Cath - R radial;  Surgeon: NNAMDI Hill MD;  Location: [facility] CATH INVASIVE LOCATION;  Service: Cardiology;  Laterality: N/A;    CARDIAC ELECTROPHYSIOLOGY PROCEDURE N/A 1/11/2024    Procedure: Loop insertion;  Surgeon: NNAMDI Hill MD;  Location: [facility] CATH INVASIVE LOCATION;  Service: Cardiovascular;  Laterality: N/A;    CARDIAC SURGERY      \"repair of hole in heart\" HAD REPAIR OF ATRIAL-SEPTAL DEFECT IN 1980    HYSTERECTOMY      LAPAROSCOPIC TUBAL LIGATION      TOTAL HIP ARTHROPLASTY Right 5/11/2023    Procedure: RIGHT TOTAL HIP ARTHROPLASTY ANTERIOR;  Surgeon: Elias Gruber MD;  Location: [facility] MAIN OR;  Service: Orthopedics;  Laterality: Right;       Family History:   Family History   Problem Relation Age of Onset    Hypertension Father     Diabetes Father     Cancer Father     Diabetes Mother     Hypertension Mother     Ovarian cancer Neg Hx     Uterine cancer Neg Hx     Breast cancer Neg Hx     Prostate cancer Neg Hx     Colon cancer Neg Hx     Clotting disorder Neg Hx     Deep vein thrombosis Neg Hx     Pulmonary embolism Neg Hx        Social History:   Social History     Socioeconomic History    Marital status:    Tobacco Use    Smoking status: Never     Passive exposure: Never    Smokeless tobacco: Never   Vaping Use    Vaping status: Never Used   Substance and Sexual Activity    Alcohol use: Never    Drug use: Never    Sexual activity: Not Currently     Birth control/protection: Hysterectomy       Home Medications:  ARIPiprazole, QUEtiapine, Vitamin D3, Vitamin E, albuterol sulfate HFA, amitriptyline, atorvastatin, budesonide-formoterol, carvedilol, " empagliflozin, escitalopram, gabapentin, hydrOXYzine, hydroCHLOROthiazide, loratadine, melatonin, metFORMIN, methocarbamol, montelukast, naloxone, omeprazole, oxyCODONE-acetaminophen, and prazosin    Allergies:  No Known Allergies    Review of Systems   All systems were reviewed and negative except for: Recurrent syncope    Objective  Objective     Vitals:   Temp:  [98.2 °F (36.8 °C)] 98.2 °F (36.8 °C)  Heart Rate:  [75] 75  Resp:  [16] 16  BP: (110)/(59) 110/59    Physical Exam    Constitutional: Awake, alert, no acute distress   Eyes: Pupils equal, sclerae anicteric, no conjunctival injection   HENT: NCAT, mucous membranes moist   Neck: Supple, no thyromegaly, no lymphadenopathy, trachea midline   Respiratory: Clear to auscultation bilaterally, nonlabored respirations    Cardiovascular: RRR, no murmurs, rubs, or gallops, palpable pedal pulses bilaterally   Gastrointestinal: Positive bowel sounds, soft, nontender, nondistended   Musculoskeletal: No bilateral ankle edema, no clubbing or cyanosis to extremities   Psychiatric: Appropriate affect, cooperative   Neurologic: Oriented x 3, strength symmetric in all extremities, Cranial Nerves grossly intact to confrontation, speech clear   Skin: No rashes     Result Review   Result Review:  I have personally reviewed the results from the time of this admission to 9/13/2024 16:16 EDT and agree with these findings:  [x]  Laboratory  [x]  Microbiology  [x]  Radiology  [x]  EKG/Telemetry   [x]  Cardiology/Vascular   [x]  Pathology  [x]  Old records  []  Other:      Assessment & Plan  Assessment / Plan   #1 COVID pneumonia   -patient treated with azithromycin and Rocephin empirically.  Complete respiratory panel ordered.  Paxlovid if patient meets criteria.  -Complete respiratory panel ordered for secondary infection.  -Will not start steroids because patient is not requiring oxygen.    #2 asthma continue home inhalers.  Continue Singulair, RT bronchopulmonary hygiene.    #3  "syncope in a patient with a known history of syncope  -Cardiology consulted.  Patient missed her last appointment with Dr. Hill.  She has a loop recorder.  Will also check orthostatics.  Gentle hydration.  -Could be secondary to prazosin?  -Could also be secondary to polypharmacy.    #4 ASD    #5 depression and anxiety  -Continue home Elavil, Abilify, prazosin, Seroquel, Lexapro, Atarax.     #6 chronic pain   -patient follows with [facility] pain management.  Continue home pain regimen of muscle relaxants,    #7 hypertension continue HCTZ.    #8 GERD continue PPI    #9 non-insulin-dependent diabetes hold metformin; cover with insulin sliding scale    VTE Prophylaxis:  Pharmacologic VTE prophylaxis orders are signed & held. Mechanical VTE prophylaxis orders are present.        CODE STATUS:    Level Of Support Discussed With: Patient  Code Status (Patient has no pulse and is not breathing): CPR (Attempt to Resuscitate)  Medical Interventions (Patient has pulse or is breathing): Full Support      Admission Status:  I believe this patient meets observation status.    Electronically signed by Brett Sutherland DO, 09/13/24, 4:04 PM EDT.             Electronically signed by Brett Sutherland DO at 09/13/24 1626          Emergency Department Notes        Evert Ramachandran MD at 09/13/24 1350          SHARED VISIT NOTE:    Patient is 49 y.o. year old female that presents to the ED for evaluation of syncope x 3, cough, dizziness.     Physical Exam    ED Course:    /59 (BP Location: Right arm, Patient Position: Lying)   Pulse 75   Temp 98.2 °F (36.8 °C) (Oral)   Resp 16   Ht 170.2 cm (67\")   Wt 74 kg (163 lb 2.3 oz)   SpO2 100%   BMI 25.55 kg/m²   Results for orders placed or performed during the hospital encounter of 09/13/24   Comprehensive Metabolic Panel    Specimen: Blood   Result Value Ref Range    Glucose 131 (H) 65 - 99 mg/dL    BUN 12 6 - 20 mg/dL    Creatinine 0.81 0.57 - 1.00 mg/dL    Sodium 139 136 - 145 " mmol/L    Potassium 3.2 (L) 3.5 - 5.2 mmol/L    Chloride 103 98 - 107 mmol/L    CO2 27.1 22.0 - 29.0 mmol/L    Calcium 9.1 8.6 - 10.5 mg/dL    Total Protein 6.6 6.0 - 8.5 g/dL    Albumin 3.6 3.5 - 5.2 g/dL    ALT (SGPT) 12 1 - 33 U/L    AST (SGOT) 16 1 - 32 U/L    Alkaline Phosphatase 94 39 - 117 U/L    Total Bilirubin 0.3 0.0 - 1.2 mg/dL    Globulin 3.0 gm/dL    A/G Ratio 1.2 g/dL    BUN/Creatinine Ratio 14.8 7.0 - 25.0    Anion Gap 8.9 5.0 - 15.0 mmol/L    eGFR 88.6 >60.0 mL/min/1.73   Magnesium    Specimen: Blood   Result Value Ref Range    Magnesium 1.7 1.6 - 2.6 mg/dL   Single High Sensitivity Troponin T    Specimen: Blood   Result Value Ref Range    HS Troponin T 7 <14 ng/L   CBC Auto Differential    Specimen: Blood   Result Value Ref Range    WBC 5.19 3.40 - 10.80 10*3/mm3    RBC 3.55 (L) 3.77 - 5.28 10*6/mm3    Hemoglobin 10.4 (L) 12.0 - 15.9 g/dL    Hematocrit 32.6 (L) 34.0 - 46.6 %    MCV 91.8 79.0 - 97.0 fL    MCH 29.3 26.6 - 33.0 pg    MCHC 31.9 31.5 - 35.7 g/dL    RDW 15.7 (H) 12.3 - 15.4 %    RDW-SD 51.7 37.0 - 54.0 fl    MPV 10.9 6.0 - 12.0 fL    Platelets 268 140 - 450 10*3/mm3    Neutrophil % 59.9 42.7 - 76.0 %    Lymphocyte % 30.6 19.6 - 45.3 %    Monocyte % 8.1 5.0 - 12.0 %    Eosinophil % 0.8 0.3 - 6.2 %    Basophil % 0.4 0.0 - 1.5 %    Immature Grans % 0.2 0.0 - 0.5 %    Neutrophils, Absolute 3.11 1.70 - 7.00 10*3/mm3    Lymphocytes, Absolute 1.59 0.70 - 3.10 10*3/mm3    Monocytes, Absolute 0.42 0.10 - 0.90 10*3/mm3    Eosinophils, Absolute 0.04 0.00 - 0.40 10*3/mm3    Basophils, Absolute 0.02 0.00 - 0.20 10*3/mm3    Immature Grans, Absolute 0.01 0.00 - 0.05 10*3/mm3    nRBC 0.0 0.0 - 0.2 /100 WBC   Scan Slide    Specimen: Blood   Result Value Ref Range    Anisocytosis Slight/1+ None Seen    WBC Morphology Normal Normal    Platelet Estimate Adequate Normal    Clumped Platelets Present None Seen   Urinalysis With Microscopic If Indicated (No Culture) - Urine, Clean Catch    Specimen: Urine, Clean  Catch   Result Value Ref Range    Color, UA Dark Yellow (A) Yellow, Straw    Appearance, UA Cloudy (A) Clear    pH, UA 5.5 5.0 - 8.0    Specific Gravity, UA >1.030 (H) 1.005 - 1.030    Glucose, UA Negative Negative    Ketones, UA Negative Negative    Bilirubin, UA Negative Negative    Blood, UA Negative Negative    Protein, UA 30 mg/dL (1+) (A) Negative    Leuk Esterase, UA Trace (A) Negative    Nitrite, UA Negative Negative    Urobilinogen, UA 1.0 E.U./dL 0.2 - 1.0 E.U./dL   Lactic Acid, Plasma    Specimen: Blood   Result Value Ref Range    Lactate 1.7 0.5 - 2.0 mmol/L   Urinalysis, Microscopic Only - Urine, Clean Catch    Specimen: Urine, Clean Catch   Result Value Ref Range    RBC, UA None Seen None Seen, 0-2 /HPF    WBC, UA 6-10 (A) None Seen, 0-2 /HPF    Bacteria, UA 2+ (A) None Seen /HPF    Squamous Epithelial Cells, UA 7-12 (A) None Seen, 0-2 /HPF    Hyaline Casts, UA 0-2 None Seen /LPF    Mucus, UA Moderate/2+ (A) None Seen, Trace /HPF    Methodology Manual Light Microscopy    ECG 12 Lead ED Triage Standing Order; Syncope   Result Value Ref Range    QT Interval 384 ms    QTC Interval 430 ms   Green Top (Gel)   Result Value Ref Range    Extra Tube Hold for add-ons.    Lavender Top   Result Value Ref Range    Extra Tube hold for add-on    Gold Top - SST   Result Value Ref Range    Extra Tube Hold for add-ons.    Light Blue Top   Result Value Ref Range    Extra Tube Hold for add-ons.      Medications   sodium chloride 0.9 % flush 10 mL (has no administration in time range)   sodium chloride 0.9 % flush 10 mL (has no administration in time range)   sodium chloride 0.9 % flush 10 mL (has no administration in time range)   sodium chloride 0.9 % infusion 40 mL (has no administration in time range)   sennosides-docusate (PERICOLACE) 8.6-50 MG per tablet 2 tablet (has no administration in time range)     And   polyethylene glycol (MIRALAX) packet 17 g (has no administration in time range)     And   bisacodyl  (DULCOLAX) EC tablet 5 mg (has no administration in time range)     And   bisacodyl (DULCOLAX) suppository 10 mg (has no administration in time range)     XR Chest 1 View    Result Date: 9/13/2024  Narrative: XR CHEST 1 VW Date of Exam: 9/13/2024 12:58 PM EDT Indication: Cough, persistent Cough cough Comparison: 5/31/2023 and prior Findings: Study limited by overlying support and monitoring apparatus. Lung volumes are low. Patient status post median sternotomy. The heart size is within normal limits for technique. Pulmonary vascularity appears grossly unremarkable in appearance. Dependent opacities and vascular crowding at the lung bases noted favored represent atelectasis. Findings slightly more prominent on the left than the right. Implantable rate monitoring device overlies the lower chest. There is no acute osseous abnormality appreciated.     Impression: Impression: 1. Dependent opacities at the lung bases favored represent atelectasis. Pneumonia not excluded. Electronically Signed: Bony Espinoza MD  9/13/2024 1:20 PM EDT  Workstation ID: [id number]     MDM:    Procedures    All labs were reviewed and interpreted by me.  All X-rays impressions were independently interpreted by me.          SHARED VISIT ATTESTATION:    This visit was performed by both myself and an APC.  I performed the substantive portion of the medical decision making. The management plan was made or approved by me, and I take responsibility for patient management.           Evert Ramachandran MD  13:50 EDT  09/13/24         Evert Ramachandran MD  09/13/24 1350      Electronically signed by Evert Ramachandran MD at 09/13/24 1350       Andrzej Brown PA-C at 09/13/24 1019          Time: 10:19 AM EDT  Date of encounter:  9/13/2024  Independent Historian/Clinical History and Information was obtained by:   Patient    History is limited by: N/A    Chief Complaint: Syncope and Dizziness      History of Present Illness:  Patient is a 49 y.o.  year old female who presents to the emergency department for evaluation of syncope and dizziness.  Patient reports that she was standing by a table today at work when she suddenly felt dizzy when she turned around and then felt dizzy and passed out.  Patient reports she has not had 1 of these episodes in a while.  Patient reports she cannot recall any specific signs or symptoms that indicates she is about to get dizzy.  Patient reports  That she is followed by cardiology.  Patient reports she is recently stopped taking her Seroquel and methocarbamol as they are too expensive.  Patient denies any fevers, chills, recent illness, nausea, vomiting, shortness of breath, urinary retention, dysuria, palpitations, chest pain, jaw pain.  Patient reports she does not check regularly check her blood sugar as she does not have a monitor at home.  Patient reports she has been taking all diabetes medications.  Bystanders report that she did not hit her head.  Patient denies any pain at this time.  Patient reports cough    Patient Care Team  Primary Care Provider: Adore Quispe APRN    Past Medical History:     No Known Allergies  Past Medical History:   Diagnosis Date    Acid reflux     Anxiety     Arthritis     Asthma     HAS INHALERS SCHEDULED AND PRN    Depression     Diabetes mellitus     AVERAGE AM 'S    Fibroid     H/O precordial chest pain     FOLLOWED BY DR HILL. DENIES CP/SOA. REPORTS WORKS FULL TIME [place].    Hyperlipidemia     Hypertension     Implantable loop recorder present     Injury of back     Migraines     [facility] management     [facility]    Sleep apnea     REPORTS IS SUPPOSED TO BE RECEIVING CPAP    Syncope      Past Surgical History:   Procedure Laterality Date    CARDIAC CATHETERIZATION N/A 08/12/2021    Procedure: Left Heart Cath - R radial;  Surgeon: NNAMDI Hill MD;  Location: [facility] INVASIVE LOCATION;  Service: Cardiology;  Laterality: N/A;    CARDIAC ELECTROPHYSIOLOGY PROCEDURE  "N/A 1/11/2024    Procedure: Loop insertion;  Surgeon: NNAMDI Hill MD;  Location: [facility] CATH INVASIVE LOCATION;  Service: Cardiovascular;  Laterality: N/A;    CARDIAC SURGERY      \"repair of hole in heart\" HAD REPAIR OF ATRIAL-SEPTAL DEFECT IN 1980    HYSTERECTOMY      LAPAROSCOPIC TUBAL LIGATION      TOTAL HIP ARTHROPLASTY Right 5/11/2023    Procedure: RIGHT TOTAL HIP ARTHROPLASTY ANTERIOR;  Surgeon: Elias Gruber MD;  Location: [facility] MAIN OR;  Service: Orthopedics;  Laterality: Right;     Family History   Problem Relation Age of Onset    Hypertension Father     Diabetes Father     Cancer Father     Diabetes Mother     Hypertension Mother     Ovarian cancer Neg Hx     Uterine cancer Neg Hx     Breast cancer Neg Hx     Prostate cancer Neg Hx     Colon cancer Neg Hx     Clotting disorder Neg Hx     Deep vein thrombosis Neg Hx     Pulmonary embolism Neg Hx        Home Medications:  Prior to Admission medications    Medication Sig Start Date End Date Taking? Authorizing Provider   albuterol sulfate  (90 Base) MCG/ACT inhaler Inhale 2 puffs Every 4 (Four) Hours As Needed.    Rama Sierra MD   amantadine (SYMMETREL) 100 MG tablet Take 1 tablet by mouth Daily. 2/16/23   Rama Sierra MD   amitriptyline (ELAVIL) 50 MG tablet Take 1 tablet by mouth Every Night.    Rama Sierra MD   ARIPiprazole (ABILIFY) 10 MG tablet Take 1 tablet by mouth Every Night.    Rama Sierra MD   aspirin 325 MG EC tablet Take 1 tablet by mouth Every 12 (Twelve) Hours. INSTRUCTED PER CARDIOLOGY TO CONTINUE TAKING FOR SURGERY 5/12/23   Rama Sierra MD   atorvastatin (LIPITOR) 20 MG tablet Take 1 tablet by mouth every night at bedtime. 1/18/23   Rama Sierra MD   Calcium 600+D3 600-20 MG-MCG tablet Take 1 tablet by mouth Daily. 2/13/23   Rama Sierra MD   carBAMazepine (CARBATROL) 100 MG 12 hr capsule TAKE 1 CAPSULE BY MOUTH TWICE DAILY X 2 WEEKS THEN TAKE 1 CAPSULE BY MOUTH " EVERY DAY X 1 WEEK THEN STOP. 9/2/23   Rama Sierra MD   carvedilol (COREG) 12.5 MG tablet Take 1 tablet by mouth 2 (Two) Times a Day. 12/7/23   Marce Graham APRN   Cholecalciferol (Vitamin D3) 1.25 MG (08641 UT) capsule Take 1 capsule by mouth 1 (One) Time Per Week. TAKES ON FRIDAY 3/15/23   Rama Sierra MD   diclofenac (VOLTAREN) 50 MG EC tablet Take 1 tablet by mouth 2 (Two) Times a Day. 8/9/23   Luci Guaman PA-C   escitalopram (LEXAPRO) 10 MG tablet Take 1 tablet by mouth Daily.    Rama Sierra MD   gabapentin (NEURONTIN) 600 MG tablet Every 8 (Eight) Hours.    Rama Sierra MD   hydroCHLOROthiazide (HYDRODIURIL) 25 MG tablet Take 1 tablet by mouth Daily. 12/21/23   Marce Graham APRN   hydrOXYzine (ATARAX) 50 MG tablet TAKE 1/2 TO 1 TABLET BY MOUTH EVERY 6 HOURS AS NEEDED FOR ITCHING 8/29/23   Rama Sierra MD   Jardiance 10 MG tablet tablet 1 tablet Daily. INSTRUCTED PER ANESTHESIA PROTOCOL 1/18/23   Rama Sierra MD   loratadine (CLARITIN) 10 MG tablet Take 1 tablet by mouth Daily. 8/29/23   Rama Sierra MD   melatonin 5 MG tablet tablet Take 1 tablet by mouth Every Night.    Rama Sierra MD   metFORMIN (GLUCOPHAGE) 500 MG tablet Take 1 tablet by mouth 2 (Two) Times a Day.    Rama Sierra MD   methocarbamol (ROBAXIN) 750 MG tablet Take 1 tablet by mouth 3 (Three) Times a Day. 3/22/23   Rama Sierra MD   montelukast (SINGULAIR) 10 MG tablet Take 1 tablet by mouth Every Night.    Rama Sierra MD   naloxone (NARCAN) 4 MG/0.1ML nasal spray Call 911. Don't prime. [place] in 1 nostril for overdose. Repeat in 2-3 minutes in other nostril if no or minimal breathing/responsiveness. 5/11/23   Elias Gruber MD   omeprazole (priLOSEC) 20 MG capsule Take 1 capsule by mouth Daily. 10/12/23   Rama Sierra MD   ondansetron ODT (ZOFRAN-ODT) 4 MG disintegrating tablet 1 tablet Every 8 (Eight) Hours  "As Needed for Nausea or Vomiting. 10/12/23   Rama Sierra MD   oxyCODONE-acetaminophen (PERCOCET) 5-325 MG per tablet Take 1 tablet by mouth 3 (Three) Times a Day. 6/2/23   Rama Sierra MD   potassium chloride 10 MEQ CR tablet Take 2 tablets by mouth 2 (Two) Times a Day. 7/30/21   NNAMDI Hill MD   prazosin (MINIPRESS) 1 MG capsule Take 1 capsule by mouth Every Night.    Rama Sierra MD   prazosin (MINIPRESS) 2 MG capsule Take 1 capsule by mouth Every Night. 6/22/21   Emergency, Nurse Epic, RN   QUEtiapine (SEROquel) 100 MG tablet Take 1 tablet by mouth Every Night. 5/9/23   Rama Sierra MD   Symbicort 80-4.5 MCG/ACT inhaler Inhale 2 puffs 2 (Two) Times a Day. 6/22/21   Rama Sierra MD   Vitamin E 180 MG (400 UNIT) capsule capsule Take 1 capsule by mouth Daily. 1/18/23   Rama Sierra MD        Social History:   Social History     Tobacco Use    Smoking status: Never     Passive exposure: Never    Smokeless tobacco: Never   Vaping Use    Vaping status: Never Used   Substance Use Topics    Alcohol use: Never    Drug use: Never         Review of Systems:  Review of Systems   Constitutional:  Negative for chills and fever.   HENT:  Negative for congestion, ear pain and sore throat.    Eyes:  Negative for pain.   Respiratory:  Positive for cough. Negative for chest tightness and shortness of breath.    Cardiovascular:  Negative for chest pain.   Gastrointestinal:  Negative for abdominal pain, diarrhea, nausea and vomiting.   Genitourinary:  Negative for flank pain and hematuria.   Musculoskeletal:  Negative for joint swelling.   Skin:  Negative for pallor.   Neurological:  Positive for syncope. Negative for seizures and headaches.   All other systems reviewed and are negative.       Physical Exam:  /59 (BP Location: Right arm, Patient Position: Lying)   Pulse 75   Temp 98.2 °F (36.8 °C) (Oral)   Resp 16   Ht 170.2 cm (67\")   Wt 74 kg (163 lb 2.3 oz)   " SpO2 100%   BMI 25.55 kg/m²     Physical Exam  Vitals and nursing note reviewed.   Constitutional:       General: She is not in acute distress.     Appearance: Normal appearance. She is obese. She is not ill-appearing or toxic-appearing.   HENT:      Head: Normocephalic and atraumatic.   Eyes:      Extraocular Movements: Extraocular movements intact.   Cardiovascular:      Rate and Rhythm: Normal rate and regular rhythm.      Pulses: Normal pulses.      Heart sounds: Normal heart sounds. No murmur heard.     No friction rub.   Pulmonary:      Effort: Pulmonary effort is normal.      Breath sounds: Normal breath sounds. No wheezing, rhonchi or rales.   Abdominal:      General: Abdomen is flat.      Palpations: Abdomen is soft.   Skin:     General: Skin is warm.   Neurological:      General: No focal deficit present.      Mental Status: She is alert and oriented to person, place, and time. Mental status is at baseline.   Psychiatric:         Mood and Affect: Mood normal.         Behavior: Behavior normal.         Thought Content: Thought content normal.         Judgment: Judgment normal.                  Procedures:  Procedures      Medical Decision Making:      Comorbidities that affect care:    Diabetes, Hypertension, Obesity    External Notes reviewed:    Previous ED Note: Last visit in May 31, 2023 for lightheadedness.  EKG was normal, sats were normal.  Blood pressures were normal., Previous EKG: Reviewed with no acute findings., and Previous Labs: Reviewed without acute findings.      The following orders were placed and all results were independently analyzed by me:  Orders Placed This Encounter   Procedures    Respiratory Culture - Sputum, Throat    Legionella Antigen, Urine - Urine, Urine, Clean Catch    S. Pneumo Ag Urine or CSF - Urine, Urine, Clean Catch    COVID PRE-OP / PRE-PROCEDURE SCREENING ORDER (NO ISOLATION) - Swab, Nasopharynx    COVID-19,CEPHEID/DARLIN,COR/MANASA/PAD/KAVIN/LAG/PENNIE IN-HOUSE,NP SWAB IN  TRANSPORT MEDIA 1 HR TAT, RT-PCR - Swab, Nasopharynx    Blood Culture - Blood,    Blood Culture - Blood,    XR Chest 1 View    [place] Draw    Comprehensive Metabolic Panel    Magnesium    Single High Sensitivity Troponin T    CBC Auto Differential    Scan Slide    Urinalysis With Microscopic If Indicated (No Culture) - Urine, Clean Catch    Lactic Acid, Plasma    Urinalysis, Microscopic Only - Urine, Clean Catch    Procalcitonin    Diet: Regular/House; Fluid Consistency: Thin (IDDSI 0)    Undress & Gown    Continuous Pulse Oximetry    Vital Signs    Orthostatic Blood Pressure    Orthostatic Blood Pressure    Vital Signs    Intake & Output    Weigh Patient    Oral Care    Saline Lock & Maintain IV Access    Place Sequential Compression Device    Maintain Sequential Compression Device    Inpatient Hospitalist Consult    Oxygen Therapy- Nasal Cannula; Titrate 1-6 LPM Per SpO2; 90 - 95%    POC Glucose Once    ECG 12 Lead ED Triage Standing Order; Syncope    Insert Peripheral IV    Insert Peripheral IV    Inpatient Admission    CBC & Differential    Green Top (Gel)    Lavender Top    Gold Top - SST    Light Blue Top       Medications Given in the Emergency Department:  Medications   sodium chloride 0.9 % flush 10 mL (has no administration in time range)   sodium chloride 0.9 % flush 10 mL (has no administration in time range)   sodium chloride 0.9 % flush 10 mL (has no administration in time range)   sodium chloride 0.9 % infusion 40 mL (has no administration in time range)   sennosides-docusate (PERICOLACE) 8.6-50 MG per tablet 2 tablet (has no administration in time range)     And   polyethylene glycol (MIRALAX) packet 17 g (has no administration in time range)     And   bisacodyl (DULCOLAX) EC tablet 5 mg (has no administration in time range)     And   bisacodyl (DULCOLAX) suppository 10 mg (has no administration in time range)   AZITHROMYCIN 500 MG/250 ML 0.9% NS IVPB (vial-mate) (has no administration in time  range)        ED Course:    ED Course as of 09/13/24 1358   Fri Sep 13, 2024   1126 Urinalysis With Microscopic If Indicated (No Culture) - Urine, Clean Catch [CB]   1147 EKG:    Rhythm: Regular  Rate: Regular  Intervals: Within normal limits  T-wave: Precordial  ST Segment: Within normal limits    EKG Comparison: EKG shows no signs of ST depressions or elevations.  Evidence of diffuse T wave inversion consistent with prior EKGs    Interpreted by me  [CB]   1212 Mucus, UA(!): Moderate/2+ [CB]   1336 XR Chest 1 View [CB]      ED Course User Index  [CB] Andrzej Brown, PACHAMP       Labs:    Lab Results (last 24 hours)       Procedure Component Value Units Date/Time    CBC & Differential [[id number]]  (Abnormal) Collected: 09/13/24 1049    Specimen: Blood Updated: 09/13/24 1121    Narrative:      The following orders were created for panel order CBC & Differential.  Procedure                               Abnormality         Status                     ---------                               -----------         ------                     CBC Auto Differential[[id number]]        Abnormal            Final result               Scan Slide[[id number]]                                       Final result                 Please view results for these tests on the individual orders.    Comprehensive Metabolic Panel [[id number]]  (Abnormal) Collected: 09/13/24 1049    Specimen: Blood Updated: 09/13/24 1112     Glucose 131 mg/dL      BUN 12 mg/dL      Creatinine 0.81 mg/dL      Sodium 139 mmol/L      Potassium 3.2 mmol/L      Comment: Slight hemolysis detected by analyzer. Result may be falsely elevated.        Chloride 103 mmol/L      CO2 27.1 mmol/L      Calcium 9.1 mg/dL      Total Protein 6.6 g/dL      Albumin 3.6 g/dL      ALT (SGPT) 12 U/L      AST (SGOT) 16 U/L      Alkaline Phosphatase 94 U/L      Total Bilirubin 0.3 mg/dL      Globulin 3.0 gm/dL      A/G Ratio 1.2 g/dL      BUN/Creatinine Ratio 14.8     Anion Gap 8.9 mmol/L       eGFR 88.6 mL/min/1.73     Narrative:      GFR Normal >60  Chronic Kidney Disease <60  Kidney Failure <15      Magnesium [[id number]]  (Normal) Collected: 09/13/24 1049    Specimen: Blood Updated: 09/13/24 1112     Magnesium 1.7 mg/dL     Single High Sensitivity Troponin T [[id number]]  (Normal) Collected: 09/13/24 1049    Specimen: Blood Updated: 09/13/24 1112     HS Troponin T 7 ng/L     Narrative:      High Sensitive Troponin T Reference Range:  <14.0 ng/L- Negative Female for AMI  <22.0 ng/L- Negative Male for AMI  >=14 - Abnormal Female indicating possible myocardial injury.  >=22 - Abnormal Male indicating possible myocardial injury.   Clinicians would have to utilize clinical acumen, EKG, Troponin, and serial changes to determine if it is an Acute Myocardial Infarction or myocardial injury due to an underlying chronic condition.         CBC Auto Differential [[id number]]  (Abnormal) Collected: 09/13/24 1049    Specimen: Blood Updated: 09/13/24 1121     WBC 5.19 10*3/mm3      RBC 3.55 10*6/mm3      Hemoglobin 10.4 g/dL      Hematocrit 32.6 %      MCV 91.8 fL      MCH 29.3 pg      MCHC 31.9 g/dL      RDW 15.7 %      RDW-SD 51.7 fl      MPV 10.9 fL      Platelets 268 10*3/mm3      Neutrophil % 59.9 %      Lymphocyte % 30.6 %      Monocyte % 8.1 %      Eosinophil % 0.8 %      Basophil % 0.4 %      Immature Grans % 0.2 %      Neutrophils, Absolute 3.11 10*3/mm3      Lymphocytes, Absolute 1.59 10*3/mm3      Monocytes, Absolute 0.42 10*3/mm3      Eosinophils, Absolute 0.04 10*3/mm3      Basophils, Absolute 0.02 10*3/mm3      Immature Grans, Absolute 0.01 10*3/mm3      nRBC 0.0 /100 WBC     Scan Slide [[id number]] Collected: 09/13/24 1049    Specimen: Blood Updated: 09/13/24 1121     Anisocytosis Slight/1+     WBC Morphology Normal     Platelet Estimate Adequate     Clumped Platelets Present    Procalcitonin [[id number]] Collected: 09/13/24 1049    Specimen: Blood Updated: 09/13/24 1348    Urinalysis With Microscopic  If Indicated (No Culture) - Urine, Clean Catch [[id number]]  (Abnormal) Collected: 09/13/24 1140    Specimen: Urine, Clean Catch Updated: 09/13/24 1203     Color, UA Dark Yellow     Appearance, UA Cloudy     pH, UA 5.5     Specific Gravity, UA >1.030     Glucose, UA Negative     Ketones, UA Negative     Bilirubin, UA Negative     Blood, UA Negative     Protein, UA 30 mg/dL (1+)     Leuk Esterase, UA Trace     Nitrite, UA Negative     Urobilinogen, UA 1.0 E.U./dL    Lactic Acid, Plasma [[id number]]  (Normal) Collected: 09/13/24 1140    Specimen: Blood Updated: 09/13/24 1219     Lactate 1.7 mmol/L     Urinalysis, Microscopic Only - Urine, Clean Catch [[id number]]  (Abnormal) Collected: 09/13/24 1140    Specimen: Urine, Clean Catch Updated: 09/13/24 1203     RBC, UA None Seen /HPF      WBC, UA 6-10 /HPF      Bacteria, UA 2+ /HPF      Squamous Epithelial Cells, UA 7-12 /HPF      Hyaline Casts, UA 0-2 /LPF      Mucus, UA Moderate/2+ /HPF      Methodology Manual Light Microscopy             Imaging:    XR Chest 1 View    Result Date: 9/13/2024  XR CHEST 1 VW Date of Exam: 9/13/2024 12:58 PM EDT Indication: Cough, persistent Cough cough Comparison: 5/31/2023 and prior Findings: Study limited by overlying support and monitoring apparatus. Lung volumes are low. Patient status post median sternotomy. The heart size is within normal limits for technique. Pulmonary vascularity appears grossly unremarkable in appearance. Dependent opacities and vascular crowding at the lung bases noted favored represent atelectasis. Findings slightly more prominent on the left than the right. Implantable rate monitoring device overlies the lower chest. There is no acute osseous abnormality appreciated.     Impression: 1. Dependent opacities at the lung bases favored represent atelectasis. Pneumonia not excluded. Electronically Signed: Bony Espinoza MD  9/13/2024 1:20 PM EDT  Workstation ID: [id number]       Differential Diagnosis and  Discussion:    Dizziness: Based on the patient's history, signs, and symptoms, the diffential diagnosis includes but is not limited to meningitis, stroke, sepsis, subarachnoid hemorrhage, intracranial bleeding, encephalitis, vertigo, electrolyte imbalance, and metabolic disorders.  Syncope: Differential diagnosis includes but is not limited to TIA, hyperventilation, aortic stenosis, pulmonary emboli, myocardial disease, bradycardia arrhythmia, heart block, tachyarrhythmia, vasovagal, orthostatic hypotension, ruptured AAA, aortic dissection, subarachnoid hemorrhage, seizure, hypoglycemia.    All labs were reviewed and interpreted by me.  All X-rays impressions were independently interpreted by me.  EKG was interpreted by me.    MDM     Amount and/or Complexity of Data Reviewed  Decide to obtain previous medical records or to obtain history from someone other than the patient: yes       The patient´s CBC that was reviewed and interpreted by me shows no abnormalities of critical concern. Of note, there is no anemia requiring a blood transfusion and the platelet count is acceptable.  The patient´s CMP that was reviewed and interpretted by me shows no abnormalities of critical concern. Of note, the patient´s sodium and potassium are acceptable. The patient´s liver enzymes are unremarkable. The patient´s renal function (creatinine) is preserved. The patient has a normal anion gap.  Checks x-ray reveals infiltrates.  Patient was given Rocephin and oral Zithromax.        Sepsis was not present during time in the emergency department.    Patient Care Considerations:    PERC: I used the PERC score to risk stratify the patient for PE and a CT of the chest was considered but ultimately not indicated in today's visit.      Consultants/Shared Management Plan:    Case was discussed with Dr. Sutherland who agrees with admission.    Social Determinants of Health:    Patient is independent, reliable, and has access to care.        Disposition and Care Coordination:    Admit:   Through independent evaluation of the patient's history, physical, and imperical data, the patient meets criteria for inpatient admission to the hospital.        Final diagnoses:   Syncope and collapse        ED Disposition       ED Disposition   Decision to Admit    Condition   --    Comment   Level of Care: Progressive Care [20]   Diagnosis: Syncope [193983]   Admitting Physician: ENEIDA MONTIEL [[id number]]   Attending Physician: ENEIDA MONTIEL [[id number]]   Certification: I Certify That Inpatient Hospital Services Are Medically Necessary For Greater Than 2 Midnights                 This medical record created using voice recognition software.             Andrzej Brown PA-C  09/13/24 1359      Electronically signed by Andrzej Brown PA-C at 09/13/24 4081

## 2024-09-16 ENCOUNTER — TELEPHONE (OUTPATIENT)
Dept: CARDIOLOGY | Facility: CLINIC | Age: 49
End: 2024-09-16

## 2024-09-16 NOTE — TELEPHONE ENCOUNTER
Caller: Casi Lopez    Relationship to patient: Self    Best call back number: [phone]     Chief complaint: SYNCOPE AND COLLAPSE     Type of visit: HOSPITAL FOLLOW UP     Requested date: ED NOTE STATES FOLLOW UP WITH DR. KAPADIA FOR TILT TABLE TEST, NO TIMEFRAME GIVEN.      If rescheduling, when is the original appointment:      Additional notes: DR. WADE CONSULTED IN HOSPITAL.

## 2024-09-17 ENCOUNTER — OFFICE VISIT (OUTPATIENT)
Dept: CARDIOLOGY | Facility: CLINIC | Age: 49
End: 2024-09-17
Payer: COMMERCIAL

## 2024-09-17 VITALS
HEART RATE: 84 BPM | WEIGHT: 163 LBS | HEIGHT: 67 IN | DIASTOLIC BLOOD PRESSURE: 94 MMHG | BODY MASS INDEX: 25.58 KG/M2 | SYSTOLIC BLOOD PRESSURE: 140 MMHG

## 2024-09-17 DIAGNOSIS — I10 ESSENTIAL HYPERTENSION: ICD-10-CM

## 2024-09-17 DIAGNOSIS — R55 SYNCOPE AND COLLAPSE: Primary | ICD-10-CM

## 2024-09-17 DIAGNOSIS — Q21.10 ASD (ATRIAL SEPTAL DEFECT): ICD-10-CM

## 2024-09-17 PROCEDURE — 99214 OFFICE O/P EST MOD 30 MIN: CPT | Performed by: NURSE PRACTITIONER

## 2024-09-18 LAB
BACTERIA SPEC AEROBE CULT: NORMAL
BACTERIA SPEC AEROBE CULT: NORMAL

## 2024-09-23 LAB
QT INTERVAL: 384 MS
QTC INTERVAL: 430 MS

## 2024-11-12 ENCOUNTER — HOSPITAL ENCOUNTER (OUTPATIENT)
Dept: CARDIOLOGY | Facility: HOSPITAL | Age: 49
Discharge: HOME | End: 2024-11-12
Admitting: INTERNAL MEDICINE
Payer: COMMERCIAL

## 2024-11-12 VITALS — BODY MASS INDEX: 25.58 KG/M2 | HEIGHT: 67 IN | WEIGHT: 163 LBS

## 2024-11-12 DIAGNOSIS — R55 SYNCOPE AND COLLAPSE: ICD-10-CM

## 2024-11-12 DIAGNOSIS — R55 VASOVAGAL SYNCOPE: ICD-10-CM

## 2024-11-12 PROCEDURE — 93660 TILT TABLE EVALUATION: CPT

## 2024-11-12 PROCEDURE — 93660 TILT TABLE EVALUATION: CPT | Performed by: INTERNAL MEDICINE

## 2024-11-12 NOTE — DISCHARGE INSTRUCTIONS
Cardiovascular Services Phone Number: [phone]    Normal activities may be resumed after you are released from the hospital.  Normal consumption of foods and liquids may be resumed immediately after the study.  Contact your regular physician for further evaluation if your symptoms occur again.  Contact your physician who directed your tilt table study if you experience any symptoms again.  Resume your preventative medications, following your doctor's instructions.  Notify your doctor if you experience your symptoms while taking your medications  Do not stop taking your medication without notifying your doctor.  Medications given during the procedure sometimes cause irritation to the vein where your IV was inserted. If this should happen, apply warm soaks to the area. If this condition persists, contact your doctor.    In the event of an emergency, call 911 or go to your nearest emergency room.

## 2024-11-13 ENCOUNTER — TRANSCRIBE ORDERS (OUTPATIENT)
Dept: ADMINISTRATIVE | Facility: HOSPITAL | Age: 49
End: 2024-11-13
Payer: COMMERCIAL

## 2024-11-13 DIAGNOSIS — Z12.31 SCREENING MAMMOGRAM FOR BREAST CANCER: Primary | ICD-10-CM

## 2024-11-26 ENCOUNTER — HOSPITAL ENCOUNTER (OUTPATIENT)
Dept: MAMMOGRAPHY | Facility: HOSPITAL | Age: 49
Discharge: HOME | End: 2024-11-26
Admitting: NURSE PRACTITIONER
Payer: COMMERCIAL

## 2024-11-26 DIAGNOSIS — Z12.31 SCREENING MAMMOGRAM FOR BREAST CANCER: ICD-10-CM

## 2024-11-26 PROCEDURE — 77063 BREAST TOMOSYNTHESIS BI: CPT

## 2024-11-26 PROCEDURE — 77067 SCR MAMMO BI INCL CAD: CPT

## 2025-04-11 ENCOUNTER — HOSPITAL ENCOUNTER (EMERGENCY)
Facility: HOSPITAL | Age: 50
Discharge: HOME | End: 2025-04-12
Attending: EMERGENCY MEDICINE

## 2025-04-11 ENCOUNTER — APPOINTMENT (OUTPATIENT)
Dept: GENERAL RADIOLOGY | Facility: HOSPITAL | Age: 50
End: 2025-04-11

## 2025-04-11 VITALS
OXYGEN SATURATION: 100 % | SYSTOLIC BLOOD PRESSURE: 157 MMHG | HEIGHT: 67 IN | DIASTOLIC BLOOD PRESSURE: 77 MMHG | WEIGHT: 160.27 LBS | HEART RATE: 76 BPM | BODY MASS INDEX: 25.16 KG/M2 | TEMPERATURE: 98 F | RESPIRATION RATE: 18 BRPM

## 2025-04-11 DIAGNOSIS — M25.461 SWELLING OF RIGHT KNEE: ICD-10-CM

## 2025-04-11 DIAGNOSIS — M25.561 ACUTE BILATERAL KNEE PAIN: ICD-10-CM

## 2025-04-11 DIAGNOSIS — M25.562 ACUTE BILATERAL KNEE PAIN: ICD-10-CM

## 2025-04-11 DIAGNOSIS — M17.0 PRIMARY OSTEOARTHRITIS OF BOTH KNEES: Primary | ICD-10-CM

## 2025-04-11 LAB — GLUCOSE BLDC GLUCOMTR-MCNC: 120 MG/DL (ref 70–99)

## 2025-04-11 PROCEDURE — 73562 X-RAY EXAM OF KNEE 3: CPT

## 2025-04-11 PROCEDURE — 82948 REAGENT STRIP/BLOOD GLUCOSE: CPT | Performed by: NURSE PRACTITIONER

## 2025-04-11 PROCEDURE — 99283 EMERGENCY DEPT VISIT LOW MDM: CPT

## 2025-04-11 NOTE — Clinical Note
[facility] EMERGENCY ROOM  [address] [place] [address]  Phone: [phone]  Fax: [phone]    Casi Lopez was seen and treated in our emergency department on 4/11/2025.  She may return to work on 04/14/2025.         Thank you for choosing [facility].    Rachana Ashford APRN

## 2025-04-12 PROCEDURE — 63710000001 PREDNISONE PER 5 MG: Performed by: EMERGENCY MEDICINE

## 2025-04-12 RX ORDER — HYDROCODONE BITARTRATE AND ACETAMINOPHEN 7.5; 325 MG/1; MG/1
1 TABLET ORAL ONCE
Refills: 0 | Status: COMPLETED | OUTPATIENT
Start: 2025-04-12 | End: 2025-04-12

## 2025-04-12 RX ORDER — DIFLUNISAL 500 MG/1
500 TABLET, FILM COATED ORAL 2 TIMES DAILY
Qty: 30 TABLET | Refills: 0 | Status: SHIPPED | OUTPATIENT
Start: 2025-04-12

## 2025-04-12 RX ORDER — PREDNISONE 50 MG/1
50 TABLET ORAL DAILY
Qty: 3 TABLET | Refills: 0 | Status: SHIPPED | OUTPATIENT
Start: 2025-04-12 | End: 2025-04-15

## 2025-04-12 RX ADMIN — HYDROCODONE BITARTRATE AND ACETAMINOPHEN 1 TABLET: 7.5; 325 TABLET ORAL at 00:37

## 2025-04-12 RX ADMIN — PREDNISONE 60 MG: 50 TABLET ORAL at 00:36

## 2025-04-12 NOTE — DISCHARGE INSTRUCTIONS
Your x-rays did not show any acute fractures or dislocations today however it does show significant degenerative changes and osteoarthritis to both your knees.  We did place you in neoprene sleeves to help with swelling comfort and stability.  Ice and elevate your extremities is much as possible to help decrease the swelling and pain.  I did send some medication to the pharmacy that you may take acetaminophen with every 4-6 hours.  Do not take any other NSAIDs such as ibuprofen or naproxen with this medication.  Monitor your blood sugars closely should it rise above 200-250 stop the oral steroids that was sent home with you for the next 3 days.  I am placing referral for orthopedics to call you as well in order to follow-up with them concerning your chronic degenerative changes.  Contact your pain management facility concerning getting back in with them for better pain control.  Return to the emergency department immediately for any acutely developing fevers of 101 or greater, any redness or warmth, any significant increase in swelling or any inability to flex or extend your knees or any new or worse concerns.

## 2025-04-12 NOTE — ED PROVIDER NOTES
Time: 10:58 PM EDT  Date of encounter:  4/11/2025  Independent Historian/Clinical History and Information was obtained by:   Patient    History is limited by: N/A    Chief Complaint: BILATERAL KNEE PAIN      History of Present Illness:    The patient is a 50 y.o. year old female who presents to the emergency department for evaluation of bilateral knee pain and swelling.  The patient reports a history of knee pain but states that recently they have gotten much worse.  She states the right hurts worse than left and does have some diffuse soft tissue swelling present.  She denies any known injuries.  She states that she used to get injections through pain management but lost her insurance and has not been able to get back in to have that done.  She is able to flex and extend both knees.  There is no significant redness or warmth associated.  She denies any fevers.  The patient states that she did speak with her insurance company and plans on getting that reinstated next week.  She also states that she is diabetic but does check her sugars daily and normally run about 120 or less.  She states that she is not on insulins and only takes metformin.  She is neurovascular intact.      Patient Care Team  Primary Care Provider: Adore Quispe APRN    Past Medical History:     No Known Allergies  Past Medical History:   Diagnosis Date    Acid reflux     Anxiety     Arthritis     Asthma     HAS INHALERS SCHEDULED AND PRN    Depression     Diabetes mellitus     [employer]    Fibroid     H/O precordial chest pain     FOLLOWED BY DR KAPADIA. DENIES CP/SOA. REPORTS WORKS FULL TIME IN [place].    Hyperlipidemia     Hypertension     Implantable loop recorder present     Injury of back     Migraines     Pain management     [facility]    Sleep apnea     REPORTS IS SUPPOSED TO BE RECEIVING CPAP    Syncope      Past Surgical History:   Procedure Laterality Date    CARDIAC CATHETERIZATION N/A 08/12/2021    Procedure: Left  "Heart Cath - R radial;  Surgeon: NNAMDI Hill MD;  Location: [facility] CATH INVASIVE LOCATION;  Service: Cardiology;  Laterality: N/A;    CARDIAC ELECTROPHYSIOLOGY PROCEDURE N/A 1/11/2024    Procedure: Loop insertion;  Surgeon: NNAMDI Hill MD;  Location: [facility] CATH INVASIVE LOCATION;  Service: Cardiovascular;  Laterality: N/A;    CARDIAC SURGERY      \"repair of hole in heart\" HAD REPAIR OF ATRIAL-SEPTAL DEFECT IN 1980    HYSTERECTOMY      LAPAROSCOPIC TUBAL LIGATION      TOTAL HIP ARTHROPLASTY Right 5/11/2023    Procedure: RIGHT TOTAL HIP ARTHROPLASTY ANTERIOR;  Surgeon: Elias Gruber MD;  Location: [facility] MAIN OR;  Service: Orthopedics;  Laterality: Right;     Family History   Problem Relation Age of Onset    Hypertension Father     Diabetes Father     Cancer Father     Diabetes Mother     Hypertension Mother     Ovarian cancer Neg Hx     Uterine cancer Neg Hx     Breast cancer Neg Hx     Prostate cancer Neg Hx     Colon cancer Neg Hx     Clotting disorder Neg Hx     Deep vein thrombosis Neg Hx     Pulmonary embolism Neg Hx        Home Medications:  Prior to Admission medications    Medication Sig Start Date End Date Taking? Authorizing Provider   albuterol sulfate  (90 Base) MCG/ACT inhaler Inhale 2 puffs Every 4 (Four) Hours As Needed.    Rama Sierra MD   amitriptyline (ELAVIL) 50 MG tablet Take 1 tablet by mouth Every Night.    Rama Sierra MD   ARIPiprazole (ABILIFY) 10 MG tablet Take 1 tablet by mouth Every Night.    Rama Sierra MD   atorvastatin (LIPITOR) 20 MG tablet Take 1 tablet by mouth every night at bedtime. 1/18/23   Rama Sierra MD   Cholecalciferol (Vitamin D3) 1.25 MG (75874 UT) capsule Take 1 capsule by mouth 1 (One) Time Per Week. TAKES ON FRIDAY 3/15/23   Rama Sierra MD   escitalopram (LEXAPRO) 10 MG tablet Take 1.5 tablets by mouth Daily.    Rama Sierra MD   gabapentin (NEURONTIN) 600 MG tablet Take 1 tablet by mouth 3 " (Three) Times a Day.    Rama Sierra MD   hydrOXYzine (ATARAX) 50 MG tablet Take 0.5-1 tablets by mouth Every 6 (Six) Hours As Needed. 8/29/23   Rama Sierra MD   loratadine (CLARITIN) 10 MG tablet Take 1 tablet by mouth Daily. 8/29/23   Rama Sierra MD   melatonin 5 MG tablet tablet Take 1 tablet by mouth Every Night.    Rama Sierra MD   metFORMIN (GLUCOPHAGE) 500 MG tablet Take 1 tablet by mouth 2 (Two) Times a Day.    Rama Sierra MD   methocarbamol (ROBAXIN) 750 MG tablet Take 1 tablet by mouth 3 (Three) Times a Day. 3/22/23   Rama Sierra MD   montelukast (SINGULAIR) 10 MG tablet Take 1 tablet by mouth Every Night.    Rama Sierra MD   naloxone (NARCAN) 4 MG/0.1ML nasal spray Call 911. Don't prime. [place] in 1 nostril for overdose. Repeat in 2-3 minutes in other nostril if no or minimal breathing/responsiveness. 5/11/23   Elias Gruber MD   omeprazole (priLOSEC) 20 MG capsule Take 1 capsule by mouth Daily. 10/12/23   Rama Sierra MD   oxyCODONE-acetaminophen (PERCOCET) 5-325 MG per tablet Take 1 tablet by mouth 3 (Three) Times a Day. 6/2/23   Rama Sierra MD   prazosin (MINIPRESS) 1 MG capsule Take 1 capsule by mouth Every Night.    Rama Sierra MD   QUEtiapine (SEROquel) 100 MG tablet Take 1 tablet by mouth Every Night. 5/9/23   Rama Sierra MD   SIMVASTATIN PO Take 40 mg by mouth Daily.    Rama Sierra MD   Symbicort 80-4.5 MCG/ACT inhaler Inhale 2 puffs 2 (Two) Times a Day. 6/22/21   Rama Sierra MD   Vitamin E 180 MG (400 UNIT) capsule capsule Take 1 capsule by mouth Daily. 1/18/23   Rama Sierra MD        Social History:   Social History     Tobacco Use    Smoking status: Never     Passive exposure: Never    Smokeless tobacco: Never   Vaping Use    Vaping status: Never Used   Substance Use Topics    Alcohol use: Never    Drug use: Never         Review of Systems:  Review of Systems  "  Constitutional:  Negative for chills and fever.   HENT:  Negative for congestion, ear pain and sore throat.    Eyes:  Negative for pain.   Respiratory:  Negative for cough, chest tightness and shortness of breath.    Cardiovascular:  Negative for chest pain.   Gastrointestinal:  Negative for abdominal pain, diarrhea, nausea and vomiting.   Genitourinary:  Negative for dysuria, flank pain, hematuria and urgency.   Musculoskeletal:  Positive for gait problem and joint swelling. Negative for arthralgias, back pain, myalgias, neck pain and neck stiffness.   Skin:  Negative for pallor and wound.   Neurological:  Negative for seizures and headaches.   All other systems reviewed and are negative.       Physical Exam:  /77 (BP Location: Left arm, Patient Position: Sitting)   Pulse 76   Temp 98 °F (36.7 °C) (Oral)   Resp 18   Ht 170.2 cm (67\")   Wt 72.7 kg (160 lb 4.4 oz)   SpO2 100%   BMI 25.10 kg/m²     Physical Exam  Vitals and nursing note reviewed.   Constitutional:       General: She is not in acute distress.     Appearance: Normal appearance. She is not ill-appearing or toxic-appearing.   HENT:      Head: Normocephalic and atraumatic.   Eyes:      General: No scleral icterus.     Conjunctiva/sclera: Conjunctivae normal.      Pupils: Pupils are equal, round, and reactive to light.   Cardiovascular:      Rate and Rhythm: Normal rate and regular rhythm.      Pulses: Normal pulses.   Pulmonary:      Effort: Pulmonary effort is normal. No respiratory distress.   Musculoskeletal:         General: Swelling and tenderness present. No deformity or signs of injury. Normal range of motion.      Cervical back: Normal range of motion.   Skin:     General: Skin is warm and dry.      Capillary Refill: Capillary refill takes less than 2 seconds.      Findings: No bruising, erythema or rash.   Neurological:      General: No focal deficit present.      Mental Status: She is alert and oriented to person, place, and time. " Mental status is at baseline.   Psychiatric:         Mood and Affect: Mood normal.         Behavior: Behavior normal.          Medical Decision Making:      Comorbidities that affect care:    Asthma, diabetes, back injury, anxiety, GERD, pain management, sleep apnea, loop recorder, depression, hyperlipidemia, hypertension, arthritis, migraines, precordial chest pain, syncope, fibroid    External Notes reviewed:    Previous Clinic Note: Patient was seen for a tilt table test on 11/12/2024 with Dr. Santoro      The following orders were placed and all results were independently analyzed by me:  Orders Placed This Encounter   Procedures    XR Knee 3 View Bilateral    Ambulatory Referral to Orthopedic Surgery    Obtain & Apply The Following- Lower extremity; Knee sleeve    Obtain & Apply The Following- Lower extremity; Knee sleeve    POC Glucose STAT       Medications Given in the Emergency Department:  Medications   predniSONE (DELTASONE) tablet 60 mg (60 mg Oral Given 4/12/25 0036)   HYDROcodone-acetaminophen (NORCO) 7.5-325 MG per tablet 1 tablet (1 tablet Oral Given 4/12/25 0037)        ED Course:         Labs:    Lab Results (last 24 hours)       Procedure Component Value Units Date/Time    POC Glucose STAT [[id number]]  (Abnormal) Collected: 04/11/25 2326    Specimen: Blood Updated: 04/11/25 2328     Glucose 120 mg/dL      Comment: Serial Number: [id number]:  [id number]                Imaging:    XR Knee 3 View Bilateral  Result Date: 4/11/2025  XR KNEE 3 VW BILATERAL Date of Exam: 4/11/2025 10:59 PM EDT Indication: WORSENING CHRONIC PAIN Comparison: 12/21/2022 Findings: There is tricompartmental osteoarthritis in both knees, worse on the left side relative to the right side. There is bilateral medial compartment joint space narrowing. No definite joint effusions are seen. No fractures. No bone erosion or destruction.     Tricompartmental osteoarthritis, worse on the left side relative to the right side. No  acute findings. Electronically Signed: Patricio Wallace MD  4/11/2025 11:19 PM EDT  Workstation ID: [id number]        Differential Diagnosis and Discussion:    Extremity Pain: Differential diagnosis includes but is not limited to soft tissue sprain, tendonitis, tendon injury, dislocation, fracture, deep vein thrombosis, arterial insufficiency, osteoarthritis, bursitis, and ligamentous damage.  Joint Pain: Differential diagnosis includes but is not limited to polyarticular arthritis, gout, tendinitis, hemarthrosis, septic arthritis, rheumatoid arthritis, bursitis, degenerative joint disease, joint effusion, autoimmune disorder, trauma, and occult neoplasm.    PROCEDURES:    X-ray were performed in the emergency department and all X-ray impressions were independently interpreted by me.    No orders to display       Procedures    MDM  Number of Diagnoses or Management Options  Acute bilateral knee pain: established and worsening  Primary osteoarthritis of both knees: established and worsening  Swelling of right knee: new and requires workup     Amount and/or Complexity of Data Reviewed  Clinical lab tests: reviewed  Tests in the radiology section of CPT®: reviewed    Risk of Complications, Morbidity, and/or Mortality  Presenting problems: low  Diagnostic procedures: low  Management options: low    Patient Progress  Patient progress: stable       Patient Care Considerations:    NARCOTICS: I considered prescribing opiate pain medication as an outpatient, however patient did not require narcotic pain medications.      Consultants/Shared Management Plan:    None    Social Determinants of Health:    Patient is independent, reliable, and has access to care.       Disposition and Care Coordination:    Discharged: The patient is suitable and stable for discharge with no need for consideration of admission.    I have explained the patient´s condition, diagnoses and treatment plan based on the information available to me at this  time. I have answered questions and addressed any concerns. The patient has a good  understanding of the patient´s diagnosis, condition, and treatment plan as can be expected at this point. The vital signs have been stable. The patient´s condition is stable and appropriate for discharge from the emergency department.      The patient will pursue further outpatient evaluation with the primary care physician or other designated or consulting physician as outlined in the discharge instructions. They are agreeable to this plan of care and follow-up instructions have been explained in detail. The patient has received these instructions in written format and has expressed an understanding of the discharge instructions. The patient is aware that any significant change in condition or worsening of symptoms should prompt an immediate return to this or the closest emergency department or call to 911.  I have explained discharge medications and the need for follow up with the patient/caretakers. This was also printed in the discharge instructions. Patient was discharged with the following medications and follow up:      Medication List        New Prescriptions      diflunisal 500 MG tablet tablet  Commonly known as: DOLOBID  Take 1 tablet by mouth 2 (Two) Times a Day.     predniSONE 50 MG tablet  Commonly known as: DELTASONE  Take 1 tablet by mouth Daily for 3 days.               Where to Get Your Medications        These medications were sent to [facility]/pharmacy #[id number] - [place], [place] - [address] - [phone]  - [phone]   [address] [place] [address]      Hours: 24-hours Phone: [phone]   diflunisal 500 MG tablet tablet  predniSONE 50 MG tablet      Zac Medina MD  [address] [facility] [id number] [phone]      FOR FOLLOW UP    Adore Quispe APRN  [address] [facility] [id number] [phone]      As needed, FOR FOLLOW UP       Final diagnoses:   Primary  osteoarthritis of both knees   Acute bilateral knee pain   Swelling of right knee        ED Disposition       ED Disposition   Discharge    Condition   Stable    Comment   --               This medical record created using voice recognition software.             Rachana Ashford, APRN  04/12/25 0634

## 2025-08-13 ENCOUNTER — OFFICE VISIT (OUTPATIENT)
Dept: ORTHOPEDIC SURGERY | Facility: CLINIC | Age: 50
End: 2025-08-13
Payer: COMMERCIAL

## 2025-08-13 ENCOUNTER — PREP FOR SURGERY (OUTPATIENT)
Dept: OTHER | Facility: HOSPITAL | Age: 50
End: 2025-08-13
Payer: COMMERCIAL

## 2025-08-13 VITALS
DIASTOLIC BLOOD PRESSURE: 88 MMHG | SYSTOLIC BLOOD PRESSURE: 141 MMHG | HEART RATE: 67 BPM | WEIGHT: 160 LBS | OXYGEN SATURATION: 98 % | HEIGHT: 67 IN | BODY MASS INDEX: 25.11 KG/M2

## 2025-08-13 DIAGNOSIS — M17.12 ARTHRITIS OF LEFT KNEE: ICD-10-CM

## 2025-08-13 DIAGNOSIS — M16.12 ARTHRITIS OF LEFT HIP: Primary | ICD-10-CM

## 2025-08-13 DIAGNOSIS — M16.12 ARTHRITIS OF LEFT HIP: ICD-10-CM

## 2025-08-13 DIAGNOSIS — M25.552 LEFT HIP PAIN: Primary | ICD-10-CM

## 2025-08-13 RX ORDER — TRANEXAMIC ACID 10 MG/ML
1000 INJECTION, SOLUTION INTRAVENOUS ONCE
OUTPATIENT
Start: 2025-08-13 | End: 2025-08-13

## 2025-08-13 RX ORDER — TRAZODONE HYDROCHLORIDE 100 MG/1
100 TABLET ORAL
COMMUNITY
Start: 2025-08-06

## 2025-08-14 ENCOUNTER — TELEPHONE (OUTPATIENT)
Dept: CARDIOLOGY | Facility: CLINIC | Age: 50
End: 2025-08-14
Payer: COMMERCIAL

## 2025-08-19 ENCOUNTER — TRANSCRIBE ORDERS (OUTPATIENT)
Dept: ADMINISTRATIVE | Facility: HOSPITAL | Age: 50
End: 2025-08-19
Payer: COMMERCIAL

## 2025-08-19 DIAGNOSIS — Z12.31 SCREENING MAMMOGRAM, ENCOUNTER FOR: Primary | ICD-10-CM

## 2025-08-20 DIAGNOSIS — Z47.1 AFTERCARE FOLLOWING LEFT HIP JOINT REPLACEMENT SURGERY: Primary | ICD-10-CM

## 2025-08-20 DIAGNOSIS — Z96.642 AFTERCARE FOLLOWING LEFT HIP JOINT REPLACEMENT SURGERY: Primary | ICD-10-CM

## 2025-08-25 ENCOUNTER — TELEPHONE (OUTPATIENT)
Dept: ORTHOPEDIC SURGERY | Facility: CLINIC | Age: 50
End: 2025-08-25
Payer: COMMERCIAL

## (undated) DEVICE — DRSNG SURESITE WNDW 4X4.5

## (undated) DEVICE — 450 ML BOTTLE OF 0.05% CHLORHEXIDINE GLUCONATE IN 99.95% STERILE WATER FOR IRRIGATION, USP AND APPLICATOR.: Brand: IRRISEPT ANTIMICROBIAL WOUND LAVAGE

## (undated) DEVICE — ELECTRD BLD EXT EDGE 1P COAT 6.5IN STRL

## (undated) DEVICE — GLIDESHEATH SLENDER ACCESS KIT: Brand: GLIDESHEATH SLENDER

## (undated) DEVICE — SHT AIR TRANSFR COMFRT GLIDE LAT 40X80IN

## (undated) DEVICE — RADIFOCUS GLIDEWIRE: Brand: GLIDEWIRE

## (undated) DEVICE — BLD CLIP UNIV SURG GRY

## (undated) DEVICE — CUFF,BP,DISP,1 TUBE,ADULT,HP: Brand: MEDLINE

## (undated) DEVICE — STRYKER PERFORMANCE SERIES SAGITTAL BLADE: Brand: STRYKER PERFORMANCE SERIES

## (undated) DEVICE — SUT VIC UD BR COAT 0 CP2 27IN

## (undated) DEVICE — SOL NS 500ML

## (undated) DEVICE — GLV SURG SENSICARE SLT PF LF 8 STRL

## (undated) DEVICE — TR BAND RADIAL ARTERY COMPRESSION DEVICE: Brand: TR BAND

## (undated) DEVICE — PCH INST SURG INVISISHIELD 2PCKT

## (undated) DEVICE — BIPOLAR SEALER 23-112-1 AQM 6.0: Brand: AQUAMANTYS™

## (undated) DEVICE — PULLOVER TOGA, 2X LARGE: Brand: FLYTE, SURGICOOL

## (undated) DEVICE — PENCL E/S SMOKEEVAC W/TELESCP CANN

## (undated) DEVICE — GLV SURG SENSICARE PI ORTHO SZ8 LF STRL

## (undated) DEVICE — ZIPPERED TOGA, PEEL-AWAY 3X LARGE: Brand: FLYTE, SURGICOOL

## (undated) DEVICE — DRP SURG U/DRP INVISISHIELD PA 48X52IN

## (undated) DEVICE — ELECTRD BLD EDGE COAT 3IN

## (undated) DEVICE — MODEL BT2000 P/N 700287-012KIT CONTENTS: MANIFOLD WITH SALINE AND CONTRAST PORTS, SALINE TUBING WITH SPIKE AND HAND SYRINGE, TRANSDUCER: Brand: BT2000 AUTOMATED MANIFOLD KIT

## (undated) DEVICE — KT PT POSITION SUPINE HANA/PROFX TABL

## (undated) DEVICE — APPL CHLORAPREP HI/LITE 26ML ORNG

## (undated) DEVICE — CONTRST ISOVUE370 76PCT 100ML

## (undated) DEVICE — CATH LAB PACK: Brand: MEDLINE INDUSTRIES, INC.

## (undated) DEVICE — Device

## (undated) DEVICE — SENSR O2 OXIMAX FNGR ADHS A/ 1P/U

## (undated) DEVICE — RADIFOCUS OPTITORQUE ANGIOGRAPHIC CATHETER: Brand: OPTITORQUE

## (undated) DEVICE — SUT ETHLN 3/0 FS1 663G

## (undated) DEVICE — BRACHIAL/AXILLARY/CEREBRAL DRAPE 38 1/2" X 60": Brand: BRACHIAL/AXILLARY/CEREBRAL DRAPE

## (undated) DEVICE — CANNULA,OXY,ADULT,SUPER SOFT,W/14'TUB,UC: Brand: MEDLINE INDUSTRIES, INC.

## (undated) DEVICE — GAUZE,SPONGE,4"X4",16PLY,STRL,LF,10/TRAY: Brand: MEDLINE

## (undated) DEVICE — DECANTER: Brand: UNBRANDED

## (undated) DEVICE — CVR LEG BOOTLEG F/R NOSKID 33IN

## (undated) DEVICE — GLV SURG SENSICARE SLT PF LF 6 STRL

## (undated) DEVICE — UNDYED BRAIDED (POLYGLACTIN 910), SYNTHETIC ABSORBABLE SUTURE: Brand: COATED VICRYL

## (undated) DEVICE — DRAPE,U/ SHT,SPLIT,PLAS,STERIL: Brand: MEDLINE

## (undated) DEVICE — MODEL AT P65, P/N 701554-001KIT CONTENTS: HAND CONTROLLER, 3-WAY HIGH-PRESSURE STOPCOCK WITH ROTATING END AND PREMIUM HIGH-PRESSURE TUBING: Brand: ANGIOTOUCH® KIT

## (undated) DEVICE — A2000 MULTI-USE SYRINGE KIT, P/N 701277-003KIT CONTENTS: 100ML CONTRAST RESERVOIR AND TUBING WITH CONTRAST SPIKE AND CLAMP: Brand: A2000 MULTI-USE SYRINGE KIT

## (undated) DEVICE — ELECTRD PAD DEFIB R/T W/LD PHYSIO/CONTROL A/ 1PR

## (undated) DEVICE — GW FC FLOP/TP .035 260CM 3MM

## (undated) DEVICE — APPL CHLORAPREP HI/LITE TINTED 10.5ML ORNG

## (undated) DEVICE — TOTAL ANTERIOR HIP-LF: Brand: MEDLINE INDUSTRIES, INC.

## (undated) DEVICE — CATH F5INF TLPIGST145 110CM6SH: Brand: INFINITI

## (undated) DEVICE — TOWEL,OR,DSP,ST,BLUE,STD,4/PK,20PK/CS: Brand: MEDLINE

## (undated) DEVICE — SLV SCD KN/LEN ADJ EXPRSS BLENDED MD 1P/U

## (undated) DEVICE — MAT FLR ABS W/BLU/LINER 56X72IN WHT

## (undated) DEVICE — STERILE POLYISOPRENE POWDER-FREE SURGICAL GLOVES WITH EMOLLIENT COATING: Brand: PROTEXIS